# Patient Record
Sex: FEMALE | Race: WHITE | NOT HISPANIC OR LATINO | Employment: FULL TIME | URBAN - METROPOLITAN AREA
[De-identification: names, ages, dates, MRNs, and addresses within clinical notes are randomized per-mention and may not be internally consistent; named-entity substitution may affect disease eponyms.]

---

## 2017-02-23 ENCOUNTER — OFFICE VISIT (OUTPATIENT)
Dept: MEDICAL GROUP | Facility: MEDICAL CENTER | Age: 32
End: 2017-02-23
Payer: COMMERCIAL

## 2017-02-23 VITALS
HEIGHT: 66 IN | TEMPERATURE: 97.5 F | HEART RATE: 98 BPM | DIASTOLIC BLOOD PRESSURE: 60 MMHG | OXYGEN SATURATION: 100 % | RESPIRATION RATE: 14 BRPM | BODY MASS INDEX: 26.64 KG/M2 | WEIGHT: 165.79 LBS | SYSTOLIC BLOOD PRESSURE: 106 MMHG

## 2017-02-23 DIAGNOSIS — R53.83 LETHARGY: ICD-10-CM

## 2017-02-23 DIAGNOSIS — F41.9 ANXIETY: ICD-10-CM

## 2017-02-23 DIAGNOSIS — Z00.00 HEALTH MAINTENANCE EXAMINATION: ICD-10-CM

## 2017-02-23 DIAGNOSIS — K20.0 EOSINOPHILIC ESOPHAGITIS: ICD-10-CM

## 2017-02-23 PROCEDURE — 99204 OFFICE O/P NEW MOD 45 MIN: CPT | Performed by: FAMILY MEDICINE

## 2017-02-23 RX ORDER — ONDANSETRON 8 MG/1
8 TABLET, ORALLY DISINTEGRATING ORAL EVERY 8 HOURS PRN
Qty: 15 TAB | Refills: 0 | Status: SHIPPED | OUTPATIENT
Start: 2017-02-23 | End: 2017-11-15

## 2017-02-23 RX ORDER — ALPRAZOLAM 1 MG/1
1 TABLET ORAL NIGHTLY PRN
COMMUNITY
End: 2017-02-23 | Stop reason: SDUPTHER

## 2017-02-23 RX ORDER — OMEPRAZOLE 40 MG/1
40 CAPSULE, DELAYED RELEASE ORAL DAILY
COMMUNITY
End: 2019-03-13 | Stop reason: SDUPTHER

## 2017-02-23 RX ORDER — ALPRAZOLAM 1 MG/1
1 TABLET ORAL
Qty: 30 TAB | Refills: 1 | Status: SHIPPED | OUTPATIENT
Start: 2017-02-23 | End: 2018-03-07 | Stop reason: SDUPTHER

## 2017-02-23 RX ORDER — IBUPROFEN 800 MG/1
800 TABLET ORAL EVERY 8 HOURS PRN
COMMUNITY
End: 2020-08-12

## 2017-02-23 RX ORDER — SCOLOPAMINE TRANSDERMAL SYSTEM 1 MG/1
1 PATCH, EXTENDED RELEASE TRANSDERMAL
Qty: 4 PATCH | Refills: 0 | Status: SHIPPED | OUTPATIENT
Start: 2017-02-23 | End: 2017-08-15

## 2017-02-23 ASSESSMENT — PATIENT HEALTH QUESTIONNAIRE - PHQ9: CLINICAL INTERPRETATION OF PHQ2 SCORE: 0

## 2017-02-23 NOTE — Clinical Note
Add2paper [facility]  Kallie Corey M.D.  [address]  [place] [address]  Fax: [phone]   Authorization for Release/Disclosure of   Protected Health Information   Name: CARLOTTA RICHARDSON : 1985 SSN: [id number]   Address: [address] [place] [address] [address] [place] [address] Phone:    [phone] (home)    I authorize the entity listed below to release/disclose the PHI below to:   [facility]/Kallie Corey M.D. and Kallie Corey M.D.   Provider or Entity Name:  [facility]    Address   [facility], [facility], [facility]   Phone:      Fax:     Reason for request: continuity of care   Information to be released:    [  ] LAST COLONOSCOPY,  including any PATH REPORT and follow-up  [  ] LAST FIT/COLOGUARD RESULT [  ] LAST DEXA  [  ] LAST MAMMOGRAM  [x  ] LAST PAP  [  ] LAST LABS [  ] RETINA EXAM REPORT  [  ] IMMUNIZATION RECORDS  [  ] Release all info      [  ] Check here and initial the line next to each item to release ALL health information INCLUDING  _____ Care and treatment for drug and / or alcohol abuse  _____ HIV testing, infection status, or AIDS  _____ Genetic Testing    DATES OF SERVICE OR TIME PERIOD TO BE DISCLOSED: _____________  I understand and acknowledge that:  * This Authorization may be revoked at any time by you in writing, except if your health information has already been used or disclosed.  * Your health information that will be used or disclosed as a result of you signing this authorization could be re-disclosed by the recipient. If this occurs, your re-disclosed health information may no longer be protected by State or Federal laws.  * You may refuse to sign this Authorization. Your refusal will not affect your ability to obtain treatment.  * This Authorization becomes effective upon signing and will  on (date) __________.      If no date is indicated, this Authorization will  one (1) year from the signature date.    Name: Carlotta VILLANUEVA  Beth    Signature:   Date:     2/23/2017       PLEASE FAX REQUESTED RECORDS BACK TO: [phone]

## 2017-02-23 NOTE — MR AVS SNAPSHOT
"Carlotta Campos   2017 3:00 PM   Office Visit   MRN: [id number]    Department:  [place] [address]   Dept Phone:  [phone]    Description:  Female : 1985   Provider:  Kallie Corey M.D.           Reason for Visit     Establish Care           Allergies as of 2017     Allergen Noted Reactions    Vicodin [Hydrocodone-Acetaminophen] 2017   Vomiting      You were diagnosed with     Lethargy   [773502]       Eosinophilic esophagitis   [530.13.ICD-9-CM]       Anxiety   [950353]       Health maintenance examination   [445261]         Vital Signs     Blood Pressure Pulse Temperature Respirations Height Weight    106/60 mmHg 98 36.4 °C (97.5 °F) 14 1.689 m (5' 6.5\") 75.2 kg (165 lb 12.6 oz)    Body Mass Index Oxygen Saturation Smoking Status             26.36 kg/m2 100% Never Smoker          Basic Information     Date Of Birth Sex Race Ethnicity Preferred Language    1985 Female White Non- English      Your appointments     2017  3:40 PM   Established Patient with Kallie Corey M.D.   [facility] (--)    [address] [address] [phone]           You will be receiving a confirmation call a few days before your appointment from our automated call confirmation system.              Problem List              ICD-10-CM Priority Class Noted - Resolved    Lethargy R53.83   2017 - Present    Eosinophilic esophagitis K20.0   2017 - Present    Anxiety F41.9   2017 - Present      Health Maintenance        Date Due Completion Dates    IMM DTaP/Tdap/Td Vaccine (5 - Tdap) 1996 7/10/1990, 1989, 1989, 1987, 1985    PAP SMEAR 2006 ---    IMM INFLUENZA (1) 2016 ---            Current Immunizations     Hepatitis B Vaccine Non-Recombivax (Ped/Adol) 1998, 1998, 3/10/1998    MMR Vaccine 1990, 1989    OPV - Historical Data 1990, 1989, 1985   " dT Vaccine 7/10/1990, 5/1/1989, 4/4/1989, 11/5/1987, 1985      Below and/or attached are the medications your provider expects you to take. Review all of your home medications and newly ordered medications with your provider and/or pharmacist. Follow medication instructions as directed by your provider and/or pharmacist. Please keep your medication list with you and share with your provider. Update the information when medications are discontinued, doses are changed, or new medications (including over-the-counter products) are added; and carry medication information at all times in the event of emergency situations     Allergies:  VICODIN - Vomiting               Medications  Valid as of: February 23, 2017 -  4:00 PM    Generic Name Brand Name Tablet Size Instructions for use    ALPRAZolam (Tab) XANAX 1 MG Take 1 Tab by mouth 1 time daily as needed for Anxiety.        Ibuprofen (Tab) MOTRIN 800 MG Take 800 mg by mouth every 8 hours as needed.        Omeprazole (CAPSULE DELAYED RELEASE) PRILOSEC 40 MG Take 40 mg by mouth every day.        Ondansetron (TABLET DISPERSIBLE) ZOFRAN ODT 8 MG Take 1 Tab by mouth every 8 hours as needed for Nausea/Vomiting.        Scopolamine Base (PATCH 72 HR) TRANSDERM-SCOP 1.5 MG/3DAYS Apply 1 Patch to skin as directed every 72 hours.        .                 Medicines prescribed today were sent to:     [facility]ACY #[id number] - [place] - [address]    [address] [place] [address]    Phone: [phone] Fax: [phone]    Open 24 Hours?: No      Medication refill instructions:       If your prescription bottle indicates you have medication refills left, it is not necessary to call your provider’s office. Please contact your pharmacy and they will refill your medication.    If your prescription bottle indicates you do not have any refills left, you may request refills at any time through one of the following ways: The online [facility] system (except Urgent Care), by calling  your provider’s office, or by asking your pharmacy to contact your provider’s office with a refill request. Medication refills are processed only during regular business hours and may not be available until the next business day. Your provider may request additional information or to have a follow-up visit with you prior to refilling your medication.   *Please Note: Medication refills are assigned a new Rx number when refilled electronically. Your pharmacy may indicate that no refills were authorized even though a new prescription for the same medication is available at the pharmacy. Please request the medicine by name with the pharmacy before contacting your provider for a refill.        Your To Do List     Future Labs/Procedures Complete By Expires    CBC WITH DIFFERENTIAL  As directed 2/24/2018    COMP METABOLIC PANEL  As directed 2/24/2018    LIPID PROFILE  As directed 2/24/2018    TSH WITH REFLEX TO FT4  As directed 2/23/2018    VITAMIN B12  As directed 2/24/2018    VITAMIN D,25 HYDROXY  As directed 2/24/2018         MyChart Access Code: Activation code not generated  Current [employer] Status: Active

## 2017-02-24 NOTE — PROGRESS NOTES
Carlotta Campos is a 32 y.o. female here for   Chief Complaint   Patient presents with   • Establish Care       HPI:  Carlotta is a very pleasant 32 y.o. female. She works at [employer].     1. Lethargy  This is a new problem. Patient states she has had a difficult time getting out of bed in the morning and overall feels run down. She denies any depression. She denies f/c. She has had cold sx recently. She is concerned b/c thyroid dysfunction runs in her family and thinks that this may be related.     2. Eosinophilic esophagitis  This is chronic. She was diagnosed by EGD with bx by Dr. Rao. She is on omeprazole 40mg/day which is controlling her symptoms such as dysphagia. She is also seeing Dr. Aldridge, allergy, and is undergoing allergy testing. She will start food elimination.     3. Anxiety  This is chronic. She is on xanax. She was started on this at age 18. She was taking this medication daily for several years. She was on prozac which did not seem to help significantly. She has not on tried other SSRIs. She has worked on her mindfulness and yoga. She only uses xanax about 1 time per month or when flying.     4. Health maintenance examination  Pap - done 2 years ago, has OBGYN  Vaccines - will need tdap when we get these in   Diet and exercise discussed      Current medicines (including changes today)  Current Outpatient Prescriptions   Medication Sig Dispense Refill   • omeprazole (PRILOSEC) 40 MG delayed-release capsule Take 40 mg by mouth every day.     • ibuprofen (MOTRIN) 800 MG Tab Take 800 mg by mouth every 8 hours as needed.     • alprazolam (XANAX) 1 MG Tab Take 1 Tab by mouth 1 time daily as needed for Anxiety. 30 Tab 1   • scopolamine (TRANSDERM-SCOP) 1.5 MG/3DAYS PATCH 72 HR Apply 1 Patch to skin as directed every 72 hours. 4 Patch 0   • ondansetron (ZOFRAN ODT) 8 MG TABLET DISPERSIBLE Take 1 Tab by mouth every 8 hours as needed for Nausea/Vomiting. 15 Tab 0     No current facility-administered  "medications for this visit.     She  has a past medical history of Panic attack and Eosinophilic esophagitis (2/23/2017).  She  has past surgical history that includes appendectomy and cholecystectomy.  Social History   Substance Use Topics   • Smoking status: Never Smoker    • Smokeless tobacco: Never Used   • Alcohol Use: 1.8 oz/week     3 Glasses of wine per week     Social History     Social History Narrative     Family History   Problem Relation Age of Onset   • Thyroid Mother    • Hypertension Mother    • Hyperlipidemia Mother    • Thyroid Paternal Aunt    • Thyroid Maternal Grandmother    • No Known Problems Father    • Heart Disease Paternal Grandfather      MI      Family Status   Relation Status Death Age   • Mother Alive    • Father Alive          ROS  Positive for sore throat, lethargy  All other systems reviewed and are negative     Objective:     Blood pressure 106/60, pulse 98, temperature 36.4 °C (97.5 °F), resp. rate 14, height 1.689 m (5' 6.5\"), weight 75.2 kg (165 lb 12.6 oz), SpO2 100 %. Body mass index is 26.36 kg/(m^2).  Physical Exam:    Constitutional: Alert, no distress.  Skin: Warm, dry, good turgor, no rashes in visible areas.  Eye: Equal, round and reactive, conjunctiva clear, lids normal.  ENMT: Lips without lesions, good dentition, oropharynx clear. TM's pearly gray with normal light reflexes bilaterally  Neck: Trachea midline, no masses, no thyromegaly. No cervical or supraclavicular lymphadenopathy.  Respiratory: Unlabored respiratory effort, lungs clear to auscultation bilaterally, no wheezes, no ronchi.  Cardiovascular: Normal S1, S2, RRR, no murmur, no edema.  Abdomen: Soft, non-tender, no masses, no hepatosplenomegaly.  Psych: Alert and oriented x3, normal affect and mood.      Assessment and Plan:   The following treatment plan was discussed    1. Lethargy  New, etiology unclear, may be 2/2 illness or wintertime blues. Requires further evaluation, especially given fhx of thyroid " dysfunction  - TSH WITH REFLEX TO FT4; Future  - VITAMIN D,25 HYDROXY; Future  - CBC WITH DIFFERENTIAL; Future  - VITAMIN B12; Future    2. Eosinophilic esophagitis  Chronic, well controlled  Continue omeprazole 40mg daily  F/U with Dr. Rao and Dr. Aldridge  Food elimination diet    3. Anxiety  Chronic, well contolled  Using xanax 1 time per month, discussed weaning completely  Refill given  Continue mindfulness  - alprazolam (XANAX) 1 MG Tab; Take 1 Tab by mouth 1 time daily as needed for Anxiety.  Dispense: 30 Tab; Refill: 1    4. Health maintenance examination  Records requested for pap  Labs ordered  - LIPID PROFILE; Future  - CBC WITH DIFFERENTIAL; Future  - COMP METABOLIC PANEL; Future      Records requested.  Followup: Return in about 6 weeks (around 4/6/2017) for Labs, annual .         This note was created using voice recognition software. I have made every reasonable attempt to correct errors, however, I do anticipate some grammatical errors.

## 2017-03-29 ENCOUNTER — HOSPITAL ENCOUNTER (OUTPATIENT)
Dept: LAB | Facility: MEDICAL CENTER | Age: 32
End: 2017-03-29
Attending: FAMILY MEDICINE
Payer: COMMERCIAL

## 2017-03-29 DIAGNOSIS — Z00.00 HEALTH MAINTENANCE EXAMINATION: ICD-10-CM

## 2017-03-29 DIAGNOSIS — R53.83 LETHARGY: ICD-10-CM

## 2017-03-29 LAB
25(OH)D3 SERPL-MCNC: 28 NG/ML (ref 30–100)
ALBUMIN SERPL BCP-MCNC: 4.7 G/DL (ref 3.2–4.9)
ALBUMIN/GLOB SERPL: 2.2 G/DL
ALP SERPL-CCNC: 46 U/L (ref 30–99)
ALT SERPL-CCNC: 11 U/L (ref 2–50)
ANION GAP SERPL CALC-SCNC: 6 MMOL/L (ref 0–11.9)
AST SERPL-CCNC: 16 U/L (ref 12–45)
BASOPHILS # BLD AUTO: 0.11 K/UL (ref 0–0.12)
BASOPHILS NFR BLD AUTO: 1.4 % (ref 0–1.8)
BILIRUB SERPL-MCNC: 0.5 MG/DL (ref 0.1–1.5)
BUN SERPL-MCNC: 12 MG/DL (ref 8–22)
CALCIUM SERPL-MCNC: 9.7 MG/DL (ref 8.5–10.5)
CHLORIDE SERPL-SCNC: 104 MMOL/L (ref 96–112)
CHOLEST SERPL-MCNC: 145 MG/DL (ref 100–199)
CO2 SERPL-SCNC: 28 MMOL/L (ref 20–33)
CREAT SERPL-MCNC: 0.7 MG/DL (ref 0.5–1.4)
EOSINOPHIL # BLD: 0.3 K/UL (ref 0–0.51)
EOSINOPHIL NFR BLD AUTO: 3.7 % (ref 0–6.9)
ERYTHROCYTE [DISTWIDTH] IN BLOOD BY AUTOMATED COUNT: 43.8 FL (ref 35.9–50)
GLOBULIN SER CALC-MCNC: 2.1 G/DL (ref 1.9–3.5)
GLUCOSE SERPL-MCNC: 95 MG/DL (ref 65–99)
HCT VFR BLD AUTO: 44.3 % (ref 37–47)
HDLC SERPL-MCNC: 62 MG/DL
HGB BLD-MCNC: 14.4 G/DL (ref 12–16)
IMM GRANULOCYTES # BLD AUTO: 0.01 K/UL (ref 0–0.11)
IMM GRANULOCYTES NFR BLD AUTO: 0.1 % (ref 0–0.9)
LDLC SERPL CALC-MCNC: 71 MG/DL
LYMPHOCYTES # BLD: 1.98 K/UL (ref 1–4.8)
LYMPHOCYTES NFR BLD AUTO: 24.4 % (ref 22–41)
MCH RBC QN AUTO: 29.5 PG (ref 27–33)
MCHC RBC AUTO-ENTMCNC: 32.5 G/DL (ref 33.6–35)
MCV RBC AUTO: 90.8 FL (ref 81.4–97.8)
MONOCYTES # BLD: 0.53 K/UL (ref 0–0.85)
MONOCYTES NFR BLD AUTO: 6.5 % (ref 0–13.4)
NEUTROPHILS # BLD: 5.19 K/UL (ref 2–7.15)
NEUTROPHILS NFR BLD AUTO: 63.9 % (ref 44–72)
NRBC # BLD AUTO: 0 K/UL
NRBC BLD-RTO: 0 /100 WBC
PLATELET # BLD AUTO: 263 K/UL (ref 164–446)
PMV BLD AUTO: 10.5 FL (ref 9–12.9)
POTASSIUM SERPL-SCNC: 4.4 MMOL/L (ref 3.6–5.5)
PROT SERPL-MCNC: 6.8 G/DL (ref 6–8.2)
RBC # BLD AUTO: 4.88 M/UL (ref 4.2–5.4)
SODIUM SERPL-SCNC: 138 MMOL/L (ref 135–145)
TRIGL SERPL-MCNC: 59 MG/DL (ref 0–149)
TSH SERPL DL<=0.005 MIU/L-ACNC: 1.48 UIU/ML (ref 0.3–3.7)
VIT B12 SERPL-MCNC: 562 PG/ML (ref 211–911)
WBC # BLD AUTO: 8.1 K/UL (ref 4.8–10.8)

## 2017-03-29 PROCEDURE — 82306 VITAMIN D 25 HYDROXY: CPT

## 2017-03-29 PROCEDURE — 82607 VITAMIN B-12: CPT

## 2017-03-29 PROCEDURE — 84443 ASSAY THYROID STIM HORMONE: CPT

## 2017-03-29 PROCEDURE — 80061 LIPID PANEL: CPT

## 2017-03-29 PROCEDURE — 80053 COMPREHEN METABOLIC PANEL: CPT

## 2017-03-29 PROCEDURE — 36415 COLL VENOUS BLD VENIPUNCTURE: CPT

## 2017-03-29 PROCEDURE — 85025 COMPLETE CBC W/AUTO DIFF WBC: CPT

## 2017-04-06 ENCOUNTER — OFFICE VISIT (OUTPATIENT)
Dept: MEDICAL GROUP | Facility: LAB | Age: 32
End: 2017-04-06
Payer: COMMERCIAL

## 2017-04-06 VITALS
RESPIRATION RATE: 12 BRPM | HEART RATE: 76 BPM | HEIGHT: 66 IN | OXYGEN SATURATION: 96 % | DIASTOLIC BLOOD PRESSURE: 74 MMHG | TEMPERATURE: 98.1 F | SYSTOLIC BLOOD PRESSURE: 104 MMHG | WEIGHT: 165.57 LBS | BODY MASS INDEX: 26.61 KG/M2

## 2017-04-06 DIAGNOSIS — Z00.00 HEALTH CARE MAINTENANCE: ICD-10-CM

## 2017-04-06 DIAGNOSIS — M62.838 NECK MUSCLE SPASM: ICD-10-CM

## 2017-04-06 DIAGNOSIS — Z23 NEED FOR VACCINATION: ICD-10-CM

## 2017-04-06 DIAGNOSIS — M25.561 ACUTE PAIN OF RIGHT KNEE: ICD-10-CM

## 2017-04-06 DIAGNOSIS — E55.9 VITAMIN D DEFICIENCY: ICD-10-CM

## 2017-04-06 PROCEDURE — 90471 IMMUNIZATION ADMIN: CPT | Performed by: FAMILY MEDICINE

## 2017-04-06 PROCEDURE — 99395 PREV VISIT EST AGE 18-39: CPT | Mod: 25 | Performed by: FAMILY MEDICINE

## 2017-04-06 PROCEDURE — 90715 TDAP VACCINE 7 YRS/> IM: CPT | Performed by: FAMILY MEDICINE

## 2017-04-06 RX ORDER — CYCLOBENZAPRINE HCL 10 MG
10 TABLET ORAL 3 TIMES DAILY PRN
Qty: 90 TAB | Refills: 1 | Status: SHIPPED | OUTPATIENT
Start: 2017-04-06 | End: 2017-08-15

## 2017-04-06 NOTE — MR AVS SNAPSHOT
"Carlotta Campos   2017 3:40 PM   Office Visit   MRN: [id number]    Department:  [facility]   Dept Phone:  [phone]    Description:  Female : 1985   Provider:  Kallie Corey M.D.           Reason for Visit     Annual Exam labs      Allergies as of 2017     Allergen Noted Reactions    Vicodin [Hydrocodone-Acetaminophen] 2017   Vomiting      You were diagnosed with     Health care maintenance   [335282]       Neck muscle spasm   [627687]       Acute pain of right knee   [5035664]       Need for vaccination   [578316]         Vital Signs     Blood Pressure Pulse Temperature Respirations Height Weight    104/74 mmHg 76 36.7 °C (98.1 °F) 12 1.689 m (5' 6.5\") 75.1 kg (165 lb 9.1 oz)    Body Mass Index Oxygen Saturation Smoking Status             26.33 kg/m2 96% Never Smoker          Basic Information     Date Of Birth Sex Race Ethnicity Preferred Language    1985 Female White Non- English      Problem List              ICD-10-CM Priority Class Noted - Resolved    Lethargy R53.83   2017 - Present    Eosinophilic esophagitis K20.0   2017 - Present    Anxiety F41.9   2017 - Present    Neck muscle spasm M62.838   2017 - Present    Acute pain of right knee M25.561   2017 - Present      Health Maintenance        Date Due Completion Dates    IMM DTaP/Tdap/Td Vaccine (5 - Tdap) 1996 7/10/1990, 1989, 1989, 1987, 1985    PAP SMEAR 10/27/2018 10/27/2015            Current Immunizations     Hepatitis B Vaccine Non-Recombivax (Ped/Adol) 1998, 1998, 3/10/1998    MMR Vaccine 1990, 1989    OPV - Historical Data 1990, 1989, 1985    Tdap Vaccine  Incomplete    dT Vaccine 7/10/1990, 1989, 1989, 1987, 1985      Below and/or attached are the medications your provider expects you to take. Review all of your home medications and newly ordered medications with your provider and/or " pharmacist. Follow medication instructions as directed by your provider and/or pharmacist. Please keep your medication list with you and share with your provider. Update the information when medications are discontinued, doses are changed, or new medications (including over-the-counter products) are added; and carry medication information at all times in the event of emergency situations     Allergies:  VICODIN - Vomiting               Medications  Valid as of: April 06, 2017 -  4:15 PM    Generic Name Brand Name Tablet Size Instructions for use    ALPRAZolam (Tab) XANAX 1 MG Take 1 Tab by mouth 1 time daily as needed for Anxiety.        Cholecalciferol (Tab) cholecalciferol 1000 UNIT Take 1,000 Units by mouth every day.        Cyclobenzaprine HCl (Tab) FLEXERIL 10 MG Take 1 Tab by mouth 3 times a day as needed.        Ibuprofen (Tab) MOTRIN 800 MG Take 800 mg by mouth every 8 hours as needed.        Omeprazole (CAPSULE DELAYED RELEASE) PRILOSEC 40 MG Take 40 mg by mouth every day.        Ondansetron (TABLET DISPERSIBLE) ZOFRAN ODT 8 MG Take 1 Tab by mouth every 8 hours as needed for Nausea/Vomiting.        Scopolamine Base (PATCH 72 HR) TRANSDERM-SCOP 1.5 MG/3DAYS Apply 1 Patch to skin as directed every 72 hours.        .                 Medicines prescribed today were sent to:     [facility]/PHARMACY #[id number] - [place] - [address]    [address] [place] [address]    Phone: [phone] Fax: [phone]    Open 24 Hours?: No      Medication refill instructions:       If your prescription bottle indicates you have medication refills left, it is not necessary to call your provider’s office. Please contact your pharmacy and they will refill your medication.    If your prescription bottle indicates you do not have any refills left, you may request refills at any time through one of the following ways: The online [employer] system (except Urgent Care), by calling your provider’s office, or by asking your pharmacy to  contact your provider’s office with a refill request. Medication refills are processed only during regular business hours and may not be available until the next business day. Your provider may request additional information or to have a follow-up visit with you prior to refilling your medication.   *Please Note: Medication refills are assigned a new Rx number when refilled electronically. Your pharmacy may indicate that no refills were authorized even though a new prescription for the same medication is available at the pharmacy. Please request the medicine by name with the pharmacy before contacting your provider for a refill.           [employer] Access Code: Activation code not generated  Current [employer] Status: Active

## 2017-07-11 ENCOUNTER — HOSPITAL ENCOUNTER (EMERGENCY)
Facility: MEDICAL CENTER | Age: 32
End: 2017-07-11
Attending: EMERGENCY MEDICINE
Payer: COMMERCIAL

## 2017-07-11 VITALS
HEIGHT: 67 IN | RESPIRATION RATE: 20 BRPM | WEIGHT: 165.34 LBS | SYSTOLIC BLOOD PRESSURE: 120 MMHG | OXYGEN SATURATION: 98 % | DIASTOLIC BLOOD PRESSURE: 84 MMHG | TEMPERATURE: 98.2 F | BODY MASS INDEX: 25.95 KG/M2 | HEART RATE: 98 BPM

## 2017-07-11 DIAGNOSIS — R10.13 EPIGASTRIC PAIN: ICD-10-CM

## 2017-07-11 LAB
ALBUMIN SERPL BCP-MCNC: 4.5 G/DL (ref 3.2–4.9)
ALBUMIN/GLOB SERPL: 2.1 G/DL
ALP SERPL-CCNC: 38 U/L (ref 30–99)
ALT SERPL-CCNC: 35 U/L (ref 2–50)
ANION GAP SERPL CALC-SCNC: 7 MMOL/L (ref 0–11.9)
APPEARANCE UR: ABNORMAL
AST SERPL-CCNC: 61 U/L (ref 12–45)
BASOPHILS # BLD AUTO: 0.4 % (ref 0–1.8)
BASOPHILS # BLD: 0.04 K/UL (ref 0–0.12)
BILIRUB SERPL-MCNC: 0.7 MG/DL (ref 0.1–1.5)
BUN SERPL-MCNC: 10 MG/DL (ref 8–22)
CALCIUM SERPL-MCNC: 9.5 MG/DL (ref 8.4–10.2)
CHLORIDE SERPL-SCNC: 105 MMOL/L (ref 96–112)
CO2 SERPL-SCNC: 26 MMOL/L (ref 20–33)
COLOR UR: YELLOW
CREAT SERPL-MCNC: 0.75 MG/DL (ref 0.5–1.4)
EOSINOPHIL # BLD AUTO: 0.09 K/UL (ref 0–0.51)
EOSINOPHIL NFR BLD: 1 % (ref 0–6.9)
ERYTHROCYTE [DISTWIDTH] IN BLOOD BY AUTOMATED COUNT: 42 FL (ref 35.9–50)
GFR SERPL CREATININE-BSD FRML MDRD: >60 ML/MIN/1.73 M 2
GLOBULIN SER CALC-MCNC: 2.1 G/DL (ref 1.9–3.5)
GLUCOSE SERPL-MCNC: 103 MG/DL (ref 65–99)
GLUCOSE UR STRIP.AUTO-MCNC: NEGATIVE MG/DL
HCG UR QL: NEGATIVE
HCT VFR BLD AUTO: 41.6 % (ref 37–47)
HGB BLD-MCNC: 14.3 G/DL (ref 12–16)
IMM GRANULOCYTES # BLD AUTO: 0.02 K/UL (ref 0–0.11)
IMM GRANULOCYTES NFR BLD AUTO: 0.2 % (ref 0–0.9)
KETONES UR STRIP.AUTO-MCNC: NEGATIVE MG/DL
LEUKOCYTE ESTERASE UR QL STRIP.AUTO: NEGATIVE
LIPASE SERPL-CCNC: 22 U/L (ref 7–58)
LYMPHOCYTES # BLD AUTO: 3.14 K/UL (ref 1–4.8)
LYMPHOCYTES NFR BLD: 34.6 % (ref 22–41)
MCH RBC QN AUTO: 30.5 PG (ref 27–33)
MCHC RBC AUTO-ENTMCNC: 34.4 G/DL (ref 33.6–35)
MCV RBC AUTO: 88.7 FL (ref 81.4–97.8)
MONOCYTES # BLD AUTO: 0.87 K/UL (ref 0–0.85)
MONOCYTES NFR BLD AUTO: 9.6 % (ref 0–13.4)
NEUTROPHILS # BLD AUTO: 4.91 K/UL (ref 2–7.15)
NEUTROPHILS NFR BLD: 54.2 % (ref 44–72)
NITRITE UR QL STRIP.AUTO: NEGATIVE
NRBC # BLD AUTO: 0 K/UL
NRBC BLD AUTO-RTO: 0 /100 WBC
PH UR STRIP.AUTO: 6.5 [PH]
PLATELET # BLD AUTO: 274 K/UL (ref 164–446)
PMV BLD AUTO: 9.7 FL (ref 9–12.9)
POTASSIUM SERPL-SCNC: 3.7 MMOL/L (ref 3.6–5.5)
PROT SERPL-MCNC: 6.6 G/DL (ref 6–8.2)
PROT UR QL STRIP: 30 MG/DL
RBC # BLD AUTO: 4.69 M/UL (ref 4.2–5.4)
RBC UR QL AUTO: NEGATIVE
SODIUM SERPL-SCNC: 138 MMOL/L (ref 135–145)
SP GR UR STRIP.AUTO: 1.02
WBC # BLD AUTO: 9.1 K/UL (ref 4.8–10.8)

## 2017-07-11 PROCEDURE — 36415 COLL VENOUS BLD VENIPUNCTURE: CPT

## 2017-07-11 PROCEDURE — 99284 EMERGENCY DEPT VISIT MOD MDM: CPT

## 2017-07-11 PROCEDURE — 700102 HCHG RX REV CODE 250 W/ 637 OVERRIDE(OP): Performed by: EMERGENCY MEDICINE

## 2017-07-11 PROCEDURE — 83690 ASSAY OF LIPASE: CPT

## 2017-07-11 PROCEDURE — 80053 COMPREHEN METABOLIC PANEL: CPT

## 2017-07-11 PROCEDURE — 81002 URINALYSIS NONAUTO W/O SCOPE: CPT

## 2017-07-11 PROCEDURE — 81025 URINE PREGNANCY TEST: CPT

## 2017-07-11 PROCEDURE — 85025 COMPLETE CBC W/AUTO DIFF WBC: CPT

## 2017-07-11 PROCEDURE — 93005 ELECTROCARDIOGRAM TRACING: CPT

## 2017-07-11 PROCEDURE — A9270 NON-COVERED ITEM OR SERVICE: HCPCS | Performed by: EMERGENCY MEDICINE

## 2017-07-11 RX ADMIN — LIDOCAINE HYDROCHLORIDE 30 ML: 20 SOLUTION OROPHARYNGEAL at 20:26

## 2017-07-11 ASSESSMENT — PAIN SCALES - GENERAL: PAINLEVEL_OUTOF10: 10

## 2017-07-11 NOTE — ED AVS SNAPSHOT
7/11/2017    Carlotta Campos  [address] [place] [address]    Dear Carlotta:    [facility] wants to ensure your discharge home is safe and you or your loved ones have had all of your questions answered regarding your care after you leave the hospital.    Below is a list of resources and contact information should you have any questions regarding your hospital stay, follow-up instructions, or active medical symptoms.    Questions or Concerns Regarding… Contact   Medical Questions Related to Your Discharge  (7 days a week, 8am-5pm) Contact a Nurse Care Coordinator   [phone]   Medical Questions Not Related to Your Discharge  (24 hours a day / 7 days a week)  Contact the Nurse Health Line   [phone]    Medications or Discharge Instructions Refer to your discharge packet   or contact your [facility] Primary Care Provider   [phone]   Follow-up Appointment(s) Schedule your appointment via [employer]   or contact Scheduling [phone]   Billing Review your statement via [employer]  or contact Billing [phone]   Medical Records Review your records via [employer]   or contact Medical Records [phone]     You may receive a telephone call within two days of discharge. This call is to make certain you understand your discharge instructions and have the opportunity to have any questions answered. You can also easily access your medical information, test results and upcoming appointments via the mValent free online health management tool. You can learn more and sign up at [employer]/mValent. For assistance setting up your mValent account, please call [phone].    Once again, we want to ensure your discharge home is safe and that you have a clear understanding of any next steps in your care. If you have any questions or concerns, please do not hesitate to contact us, we are here for you. Thank you for choosing [facility] for your healthcare needs.    Sincerely,    Your [facility] Healthcare Team          oriented to person, place, time and situation

## 2017-07-11 NOTE — ED AVS SNAPSHOT
WeDuc Access Code: Activation code not generated  Current WeDuc Status: Active    [employer]  A secure, online tool to manage your health information     [employer]’s WeDuc® is a secure, online tool that connects you to your personalized health information from the privacy of your home -- day or night - making it very easy for you to manage your healthcare. Once the activation process is completed, you can even access your medical information using the WeDuc rafa, which is available for free in the [employer]a store or Google Play store.     WeDuc provides the following levels of access (as shown below):   My Chart Features   [facility] Primary Care Doctor [facility]  Specialists [facility]  Urgent  Care Non-[facility]  Primary Care  Doctor   Email your healthcare team securely and privately 24/7 X X X X   Manage appointments: schedule your next appointment; view details of past/upcoming appointments X      Request prescription refills. X      View recent personal medical records, including lab and immunizations X X X X   View health record, including health history, allergies, medications X X X X   Read reports about your outpatient visits, procedures, consult and ER notes X X X X   See your discharge summary, which is a recap of your hospital and/or ER visit that includes your diagnosis, lab results, and care plan. X X       How to register for WeDuc:  1. Go to  [email or web address].  2. Click on the Sign Up Now box, which takes you to the New Member Sign Up page. You will need to provide the following information:  a. Enter your WeDuc Access Code exactly as it appears at the top of this page. (You will not need to use this code after you’ve completed the sign-up process. If you do not sign up before the expiration date, you must request a new code.)   b. Enter your date of birth.   c. Enter your home email address.   d. Click Submit, and follow the next screen’s instructions.  3. Create a WeDuc ID. This will  be your [employer] login ID and cannot be changed, so think of one that is secure and easy to remember.  4. Create a [employer] password. You can change your password at any time.  5. Enter your Password Reset Question and Answer. This can be used at a later time if you forget your password.   6. Enter your e-mail address. This allows you to receive e-mail notifications when new information is available in [employer].  7. Click Sign Up. You can now view your health information.    For assistance activating your [employer] account, call [phone]

## 2017-07-11 NOTE — ED AVS SNAPSHOT
Home Care Instructions                                                                                                                Carlotta Campos   MRN: [id number]    Department:  [facility], Emergency Dept   Date of Visit:  7/11/2017            [facility], Emergency Dept    [address]    [place] [address]    Phone:  [phone]      You were seen by     Jaylon Maxwell M.D.      Your Diagnosis Was     Epigastric pain     R10.13       These are the medications you received during your hospitalization from 07/11/2017 1948 to 07/11/2017 2107     Date/Time Order Dose Route Action    07/11/2017 2026 hyoscyamine-maalox plus-lidocaine viscous (GI COCKTAIL) oral susp 30 mL 30 mL Oral Given      Follow-up Information     1. Follow up with [facility], Emergency Dept.    Specialty:  Emergency Medicine    Contact information    [id number] [address] [phone]      Medication Information     Review all of your home medications and newly ordered medications with your primary doctor and/or pharmacist as soon as possible. Follow medication instructions as directed by your doctor and/or pharmacist.     Please keep your complete medication list with you and share with your physician. Update the information when medications are discontinued, doses are changed, or new medications (including over-the-counter products) are added; and carry medication information at all times in the event of emergency situations.               Medication List      ASK your doctor about these medications        Instructions    Morning Afternoon Evening Bedtime    alprazolam 1 MG Tabs   Commonly known as:  XANAX        Take 1 Tab by mouth 1 time daily as needed for Anxiety.   Dose:  1 mg                        cyclobenzaprine 10 MG Tabs   Commonly known as:  FLEXERIL        Take 1 Tab by mouth 3 times a day as needed.   Dose:  10 mg                        ibuprofen 800 MG Tabs   Commonly known as:  MOTRIN        Take 800 mg by mouth every 8 hours as needed.   Dose:  800 mg                        multivitamin Tabs        Take 1 Tab by mouth every day.   Dose:  1 Tab                        ondansetron 8 MG Tbdp   Commonly known as:  ZOFRAN ODT        Take 1 Tab by mouth every 8 hours as needed for Nausea/Vomiting.   Dose:  8 mg                        PRILOSEC 40 MG delayed-release capsule   Generic drug:  omeprazole        Take 40 mg by mouth every day.   Dose:  40 mg                        scopolamine 1.5 MG/3DAYS Pt72   Commonly known as:  TRANSDERM-SCOP        Apply 1 Patch to skin as directed every 72 hours.   Dose:  1 Patch                        vitamin D 1000 UNIT Tabs   Commonly known as:  cholecalciferol        Take 1,000 Units by mouth every day.   Dose:  1000 Units                                Procedures and tests performed during your visit     CARDIAC MONITORING    CBC WITH DIFFERENTIAL    COMP METABOLIC PANEL    ESTIMATED GFR    LIPASE    O2 Protocol    POC UA    POC URINE PREGNANCY    SALINE LOCK        Discharge Instructions       Abdominal Pain, Women  Abdominal (stomach, pelvic, or belly) pain can be caused by many things. It is important to tell your doctor:  · The location of the pain.  · Does it come and go or is it present all the time?  · Are there things that start the pain (eating certain foods, exercise)?  · Are there other symptoms associated with the pain (fever, nausea, vomiting, diarrhea)?  All of this is helpful to know when trying to find the cause of the pain.  CAUSES   · Stomach: virus or bacteria infection, or ulcer.  · Intestine: appendicitis (inflamed appendix), regional ileitis (Crohn's disease), ulcerative colitis (inflamed colon), irritable bowel syndrome, diverticulitis (inflamed diverticulum of the colon), or cancer of the stomach or intestine.  · Gallbladder disease or stones in the gallbladder.  · Kidney  disease, kidney stones, or infection.  · Pancreas infection or cancer.  · Fibromyalgia (pain disorder).  · Diseases of the female organs:  · Uterus: fibroid (non-cancerous) tumors or infection.  · Fallopian tubes: infection or tubal pregnancy.  · Ovary: cysts or tumors.  · Pelvic adhesions (scar tissue).  · Endometriosis (uterus lining tissue growing in the pelvis and on the pelvic organs).  · Pelvic congestion syndrome (female organs filling up with blood just before the menstrual period).  · Pain with the menstrual period.  · Pain with ovulation (producing an egg).  · Pain with an IUD (intrauterine device, birth control) in the uterus.  · Cancer of the female organs.  · Functional pain (pain not caused by a disease, may improve without treatment).  · Psychological pain.  · Depression.  DIAGNOSIS   Your doctor will decide the seriousness of your pain by doing an examination.  · Blood tests.  · X-rays.  · Ultrasound.  · CT scan (computed tomography, special type of X-ray).  · MRI (magnetic resonance imaging).  · Cultures, for infection.  · Barium enema (dye inserted in the large intestine, to better view it with X-rays).  · Colonoscopy (looking in intestine with a lighted tube).  · Laparoscopy (minor surgery, looking in abdomen with a lighted tube).  · Major abdominal exploratory surgery (looking in abdomen with a large incision).  TREATMENT   The treatment will depend on the cause of the pain.   · Many cases can be observed and treated at home.  · Over-the-counter medicines recommended by your caregiver.  · Prescription medicine.  · Antibiotics, for infection.  · Birth control pills, for painful periods or for ovulation pain.  · Hormone treatment, for endometriosis.  · Nerve blocking injections.  · Physical therapy.  · Antidepressants.  · Counseling with a psychologist or psychiatrist.  · Minor or major surgery.  HOME CARE INSTRUCTIONS   · Do not take laxatives, unless directed by your caregiver.  · Take  over-the-counter pain medicine only if ordered by your caregiver. Do not take aspirin because it can cause an upset stomach or bleeding.  · Try a clear liquid diet (broth or water) as ordered by your caregiver. Slowly move to a bland diet, as tolerated, if the pain is related to the stomach or intestine.  · Have a thermometer and take your temperature several times a day, and record it.  · Bed rest and sleep, if it helps the pain.  · Avoid sexual intercourse, if it causes pain.  · Avoid stressful situations.  · Keep your follow-up appointments and tests, as your caregiver orders.  · If the pain does not go away with medicine or surgery, you may try:  · Acupuncture.  · Relaxation exercises (yoga, meditation).  · Group therapy.  · Counseling.  SEEK MEDICAL CARE IF:   · You notice certain foods cause stomach pain.  · Your home care treatment is not helping your pain.  · You need stronger pain medicine.  · You want your IUD removed.  · You feel faint or lightheaded.  · You develop nausea and vomiting.  · You develop a rash.  · You are having side effects or an allergy to your medicine.  SEEK IMMEDIATE MEDICAL CARE IF:   · Your pain does not go away or gets worse.  · You have a fever.  · Your pain is felt only in portions of the abdomen. The right side could possibly be appendicitis. The left lower portion of the abdomen could be colitis or diverticulitis.  · You are passing blood in your stools (bright red or black tarry stools, with or without vomiting).  · You have blood in your urine.  · You develop chills, with or without a fever.  · You pass out.  MAKE SURE YOU:   · Understand these instructions.  · Will watch your condition.  · Will get help right away if you are not doing well or get worse.  Document Released: 10/14/2008 Document Revised: 03/11/2013 Document Reviewed: 11/04/2010  [employer]® Patient Information ©2014 [employer], [employer].            Patient Information     Patient Information    Following emergency  treatment: all patient requiring follow-up care must return either to a private physician or a clinic if your condition worsens before you are able to obtain further medical attention, please return to the emergency room.     Billing Information    At [facility], we work to make the billing process streamlined for our patients.  Our Representatives are here to answer any questions you may have regarding your hospital bill.  If you have insurance coverage and have supplied your insurance information to us, we will submit a claim to your insurer on your behalf.  Should you have any questions regarding your bill, we can be reached online or by phone as follows:  Online: You are able pay your bills online or live chat with our representatives about any billing questions you may have. We are here to help Monday - Friday from 8:00am to 7:30pm and 9:00am - 12:00pm on Saturdays.  Please visit [email or web address]  for more information.   Phone:  [phone] or [phone]    Please note that your emergency physician, surgeon, pathologist, radiologist, anesthesiologist, and other specialists are not employed by [facility] and will therefore bill separately for their services.  Please contact them directly for any questions concerning their bills at the numbers below:     Emergency Physician Services:  [phone]  [facility]:  [phone]  [facility]:  [phone]  [facility]:  [phone]    1. Your final bill may vary from the amount quoted upon discharge if all procedures are not complete at that time, or if your doctor has additional procedures of which we are not aware. You will receive an additional bill if you return to the Emergency Department at [facility] for suture removal regardless of the facility of which the sutures were placed.     2. Please arrange for settlement of this account at the emergency  registration.    3. All self-pay accounts are due in full at the time of treatment.  If you are unable to meet this obligation then payment is expected within 4-5 days.     4. If you have had radiology studies (CT, X-ray, Ultrasound, MRI), you have received a preliminary result during your emergency department visit. Please contact the radiology department [phone] to receive a copy of your final result. Please discuss the Final result with your primary physician or with the follow up physician provided.     Crisis Hotline:  [place] Crisis Hotline:  4-514-CIWQWKI or 1-645.710.7683  [place] Crisis Hotline:    1-526.582.8826 or 540-717-9836         ED Discharge Follow Up Questions    1. In order to provide you with very good care, we would like to follow up with a phone call in the next few days.  May we have your permission to contact you?     YES /  NO    2. What is the best phone number to call you? (       )_____-__________    3. What is the best time to call you?      Morning  /  Afternoon  /  Evening                   Patient Signature:  ____________________________________________________________    Date:  ____________________________________________________________

## 2017-07-12 NOTE — ED PROVIDER NOTES
"ED Provider Note    CHIEF COMPLAINT  Chief Complaint   Patient presents with   • Abdominal Pain   • Epigastric Pain       HPI  Carlotta Campos is a 32 y.o. female who presents to the emergency department with abdominal discomfort. The patient states she started developing waves of abdominal pain epigastric region extending into her chest that started after eating dinner. She states the pain comes and goes and she is unaware of any exacerbating or relieving factors. She states that when it's at its maximum it is severe in intensity. She does not have any associated difficulty with breathing. She does not have any vomiting. She is not any fevers. She's had a cholecystectomy as well as an appendectomy in the past. She does have a history of eosinophilic esophagitis.    REVIEW OF SYSTEMS  See HPI for further details. All other systems are negative.     PAST MEDICAL HISTORY  Past Medical History   Diagnosis Date   • Panic attack    • Eosinophilic esophagitis 2/23/2017   • Psychiatric disorder      anxiety       SOCIAL HISTORY  Social History     Social History   • Marital Status: Single     Spouse Name: N/A   • Number of Children: N/A   • Years of Education: N/A     Social History Main Topics   • Smoking status: Never Smoker    • Smokeless tobacco: Never Used   • Alcohol Use: 1.8 oz/week     3 Glasses of wine per week   • Drug Use: No   • Sexual Activity:     Partners: Male     Birth Control/ Protection: IUD     Other Topics Concern   • None     Social History Narrative           PHYSICAL EXAM  VITAL SIGNS: /44 mmHg  Pulse 103  Temp(Src) 36.4 °C (97.6 °F)  Resp 22  Ht 1.702 m (5' 7\")  Wt 75 kg (165 lb 5.5 oz)  BMI 25.89 kg/m2  Constitutional: Anxious but no acute distress  HENT: Normocephalic, Atraumatic, tympanic membranes are intact and nonerythematous bilaterally, Oropharynx moist without exudates or erythema, Nose normal.   Eyes: PERRLA, EOMI, Conjunctiva normal.  Neck: Supple without " meningismus  Lymphatic: No lymphadenopathy noted.   Cardiovascular: Slightly tachycardic heart rate, Normal rhythm, No murmurs, No rubs, No gallops.   Thorax & Lungs: Normal breath sounds, No respiratory distress, No wheezing, No chest tenderness.   Abdomen: Bowel sounds normal, Soft, No tenderness, no rebound, no guarding, no distention, No masses, No pulsatile masses.   Skin: Warm, Dry, No erythema, No rash.   Back: No tenderness, No CVA tenderness.   Extremities: Atraumatic with symmetric distal pulses, No edema, No tenderness, No cyanosis, No clubbing.   Neurologic: Alert & oriented x 3, cranial nerves II through XII are intact, Normal motor function, Normal sensory function, No focal deficits noted.   Psychiatric: Affect normal, Judgment normal, Mood normal.     COURSE & MEDICAL DECISION MAKING  Pertinent Labs & Imaging studies reviewed. (See chart for details)  This a 32-year-old female who presents with epigastric discomfort. I suspect her symptoms are from esophagitis. The patient's abdomen is benign and she has had significant improvement with the GI cocktail. The bloodwork does not show any evidence of pancreatitis. She's had a cholecystectomy in the past. The patient be discharged clear instructions to continue her proton pump inhibitor and return if she is acutely worse. Based on her age and lack of risk factors a cardiac source of fever unlikely.      EKG interpretation  12-lead EKG interpreted by myself shows a normal sinus rhythm with a ventricular rate of 96, normal QRS, normal intervals, no ST segment elevation or depression, overall normal EKG  FINAL IMPRESSION  1. Epigastric pain    Disposition  The patient will be discharged in stable condition    Electronically signed by: Jaylon Maxwell, 7/11/2017 8:13 PM

## 2017-07-12 NOTE — DISCHARGE INSTRUCTIONS
Abdominal Pain, Women  Abdominal (stomach, pelvic, or belly) pain can be caused by many things. It is important to tell your doctor:  · The location of the pain.  · Does it come and go or is it present all the time?  · Are there things that start the pain (eating certain foods, exercise)?  · Are there other symptoms associated with the pain (fever, nausea, vomiting, diarrhea)?  All of this is helpful to know when trying to find the cause of the pain.  CAUSES   · Stomach: virus or bacteria infection, or ulcer.  · Intestine: appendicitis (inflamed appendix), regional ileitis (Crohn's disease), ulcerative colitis (inflamed colon), irritable bowel syndrome, diverticulitis (inflamed diverticulum of the colon), or cancer of the stomach or intestine.  · Gallbladder disease or stones in the gallbladder.  · Kidney disease, kidney stones, or infection.  · Pancreas infection or cancer.  · Fibromyalgia (pain disorder).  · Diseases of the female organs:  · Uterus: fibroid (non-cancerous) tumors or infection.  · Fallopian tubes: infection or tubal pregnancy.  · Ovary: cysts or tumors.  · Pelvic adhesions (scar tissue).  · Endometriosis (uterus lining tissue growing in the pelvis and on the pelvic organs).  · Pelvic congestion syndrome (female organs filling up with blood just before the menstrual period).  · Pain with the menstrual period.  · Pain with ovulation (producing an egg).  · Pain with an IUD (intrauterine device, birth control) in the uterus.  · Cancer of the female organs.  · Functional pain (pain not caused by a disease, may improve without treatment).  · Psychological pain.  · Depression.  DIAGNOSIS   Your doctor will decide the seriousness of your pain by doing an examination.  · Blood tests.  · X-rays.  · Ultrasound.  · CT scan (computed tomography, special type of X-ray).  · MRI (magnetic resonance imaging).  · Cultures, for infection.  · Barium enema (dye inserted in the large intestine, to better view it with  X-rays).  · Colonoscopy (looking in intestine with a lighted tube).  · Laparoscopy (minor surgery, looking in abdomen with a lighted tube).  · Major abdominal exploratory surgery (looking in abdomen with a large incision).  TREATMENT   The treatment will depend on the cause of the pain.   · Many cases can be observed and treated at home.  · Over-the-counter medicines recommended by your caregiver.  · Prescription medicine.  · Antibiotics, for infection.  · Birth control pills, for painful periods or for ovulation pain.  · Hormone treatment, for endometriosis.  · Nerve blocking injections.  · Physical therapy.  · Antidepressants.  · Counseling with a psychologist or psychiatrist.  · Minor or major surgery.  HOME CARE INSTRUCTIONS   · Do not take laxatives, unless directed by your caregiver.  · Take over-the-counter pain medicine only if ordered by your caregiver. Do not take aspirin because it can cause an upset stomach or bleeding.  · Try a clear liquid diet (broth or water) as ordered by your caregiver. Slowly move to a bland diet, as tolerated, if the pain is related to the stomach or intestine.  · Have a thermometer and take your temperature several times a day, and record it.  · Bed rest and sleep, if it helps the pain.  · Avoid sexual intercourse, if it causes pain.  · Avoid stressful situations.  · Keep your follow-up appointments and tests, as your caregiver orders.  · If the pain does not go away with medicine or surgery, you may try:  · Acupuncture.  · Relaxation exercises (yoga, meditation).  · Group therapy.  · Counseling.  SEEK MEDICAL CARE IF:   · You notice certain foods cause stomach pain.  · Your home care treatment is not helping your pain.  · You need stronger pain medicine.  · You want your IUD removed.  · You feel faint or lightheaded.  · You develop nausea and vomiting.  · You develop a rash.  · You are having side effects or an allergy to your medicine.  SEEK IMMEDIATE MEDICAL CARE IF:   · Your  pain does not go away or gets worse.  · You have a fever.  · Your pain is felt only in portions of the abdomen. The right side could possibly be appendicitis. The left lower portion of the abdomen could be colitis or diverticulitis.  · You are passing blood in your stools (bright red or black tarry stools, with or without vomiting).  · You have blood in your urine.  · You develop chills, with or without a fever.  · You pass out.  MAKE SURE YOU:   · Understand these instructions.  · Will watch your condition.  · Will get help right away if you are not doing well or get worse.  Document Released: 10/14/2008 Document Revised: 03/11/2013 Document Reviewed: 11/04/2010  [employer]® Patient Information ©2014 [employer], [email or web address].

## 2017-07-17 ENCOUNTER — OFFICE VISIT (OUTPATIENT)
Dept: MEDICAL GROUP | Facility: LAB | Age: 32
End: 2017-07-17
Payer: COMMERCIAL

## 2017-07-17 VITALS
BODY MASS INDEX: 25.74 KG/M2 | WEIGHT: 164 LBS | OXYGEN SATURATION: 98 % | DIASTOLIC BLOOD PRESSURE: 70 MMHG | SYSTOLIC BLOOD PRESSURE: 110 MMHG | TEMPERATURE: 98 F | RESPIRATION RATE: 12 BRPM | HEART RATE: 70 BPM | HEIGHT: 67 IN

## 2017-07-17 DIAGNOSIS — H02.826 SEBACEOUS CYST OF LEFT EYELID: ICD-10-CM

## 2017-07-17 DIAGNOSIS — L82.1 SEBORRHEIC KERATOSIS: ICD-10-CM

## 2017-07-17 DIAGNOSIS — K20.0 EOSINOPHILIC ESOPHAGITIS: ICD-10-CM

## 2017-07-17 PROCEDURE — 99214 OFFICE O/P EST MOD 30 MIN: CPT | Performed by: NURSE PRACTITIONER

## 2017-07-17 NOTE — ASSESSMENT & PLAN NOTE
She is concerned about a small area on the left side of her upper eye lid , it is not painful but is not resolving. It has been present for about two weeks. She has tried moist heat but it is not going away.

## 2017-07-17 NOTE — PROGRESS NOTES
Chief Complaint   Patient presents with   • Nevus     back and inner thigh   • Eye Problem     left corner X 2 months   • Abdominal Pain       HISTORY OF PRESENT ILLNESS: Patient is a 32 y.o. female established patient of Dr. Evans who presents today to discuss the following problems.        Eosinophilic esophagitis  She was seen in the ER for abdominal pain. She has seen her GI doctor and he is now taking 40 mg of Prilosec this is resolved her symptoms    Seborrheic keratosis  Patient has 3 skin lesions that she's been concerned about on her back left shoulder and left leg.    Sebaceous cyst of left eyelid  She is concerned about a small area on the left side of her upper eye lid , it is not painful but is not resolving. It has been present for about two weeks. She has tried moist heat but it is not going away.       Patient Active Problem List    Diagnosis Date Noted   • Seborrheic keratosis 07/17/2017   • Sebaceous cyst of left eyelid 07/17/2017   • Neck muscle spasm 04/06/2017   • Acute pain of right knee 04/06/2017   • Vitamin D deficiency 04/06/2017   • Lethargy 02/23/2017   • Eosinophilic esophagitis 02/23/2017   • Anxiety 02/23/2017       Allergies:Vicodin    Current Outpatient Prescriptions   Medication Sig Dispense Refill   • multivitamin (THERAGRAN) Tab Take 1 Tab by mouth every day.     • vitamin D (CHOLECALCIFEROL) 1000 UNIT Tab Take 1,000 Units by mouth every day.     • cyclobenzaprine (FLEXERIL) 10 MG Tab Take 1 Tab by mouth 3 times a day as needed. 90 Tab 1   • omeprazole (PRILOSEC) 40 MG delayed-release capsule Take 40 mg by mouth every day.     • ibuprofen (MOTRIN) 800 MG Tab Take 800 mg by mouth every 8 hours as needed.     • alprazolam (XANAX) 1 MG Tab Take 1 Tab by mouth 1 time daily as needed for Anxiety. 30 Tab 1   • scopolamine (TRANSDERM-SCOP) 1.5 MG/3DAYS PATCH 72 HR Apply 1 Patch to skin as directed every 72 hours. 4 Patch 0   • ondansetron (ZOFRAN ODT) 8 MG TABLET DISPERSIBLE Take 1 Tab  "by mouth every 8 hours as needed for Nausea/Vomiting. 15 Tab 0     No current facility-administered medications for this visit.       Social History   Substance Use Topics   • Smoking status: Never Smoker    • Smokeless tobacco: Never Used   • Alcohol Use: 1.8 oz/week     3 Glasses of wine per week       Family Status   Relation Status Death Age   • Mother Alive    • Father Alive      Family History   Problem Relation Age of Onset   • Thyroid Mother    • Hypertension Mother    • Hyperlipidemia Mother    • Thyroid Paternal Aunt    • Thyroid Maternal Grandmother    • No Known Problems Father    • Heart Disease Paternal Grandfather      MI        ROS:  Review of Systems   Constitutional: Negative for fever, chills, weight loss and malaise/fatigue.   HENT: Negative for ear pain, nosebleeds,     Respiratory: Negative for cough, sputum production, shortness of breath and wheezing.    Cardiovascular: Negative for chest pain, palpitations,  Gastrointestinal: No further abdominal pain    Skin: Negative for rash and itching. She is concerned about skin lesions and a bump on her left eyelid         Exam:  Blood pressure 110/70, pulse 70, temperature 36.7 °C (98 °F), resp. rate 12, height 1.689 m (5' 6.5\"), weight 74.39 kg (164 lb), SpO2 98 %.  General:  Well nourished, well developed female in NAD  Head left upper  eyelid lateral, 3 mm firm white lesion at the edge of the lateral eyelid consistent with small sebaceous cyst in  Neck: Supple without cervical lymphadenopathy  Pulmonary: Clear to ausculation   Normal effort. No rales, ronchi, or wheezing.  Cardiovascular: Regular rate and rhythm without murmur. ShExtremities: no clubbing, cyanosis, or edema.  Skin: 8mm raised brown lesion on the mid back, 4 mm raised lesion on the left shoulder and a brown raised 4 mm lesion on the left leg all consistent with seborrheic keratosis    ER records are reviewed  Please note that this dictation was created using voice recognition " software. I have made every reasonable attempt to correct obvious errors, but I expect that there are errors of grammar and possibly content that I did not discover before finalizing the note.    Assessment/Plan:  1. Eosinophilic esophagitis   much improved continue current dose of omeprazole follow-up with gastroenterology follow-up as needed if symptoms worsen, return to the ER if symptoms are severe, may use liquid antacids or heat if unable to access the ER immediately    2. Seborrheic keratosis   no need for further treatment continue to follow    3. Sebaceous cyst of left eyelid   moist heat continue to observe no need for further treatment

## 2017-07-17 NOTE — ASSESSMENT & PLAN NOTE
Patient has 3 skin lesions that she's been concerned about on her back left shoulder and left leg.

## 2017-07-17 NOTE — MR AVS SNAPSHOT
"        Carlotta VILLANUEVA Beth   2017 2:20 PM   Office Visit   MRN: [id number]    Department:  [facility]   Dept Phone:  [phone]    Description:  Female : 1985   Provider:  CHUYITA Koenig           Reason for Visit     Nevus back and inner thigh    Eye Problem left corner X 2 months    Abdominal Pain           Allergies as of 2017     Allergen Noted Reactions    Vicodin [Hydrocodone-Acetaminophen] 2017   Vomiting      You were diagnosed with     Eosinophilic esophagitis   [530.13.ICD-9-CM]       Seborrheic keratosis   [5012345]       Sebaceous cyst of left eyelid   [8057070]         Vital Signs     Blood Pressure Pulse Temperature Respirations Height Weight    110/70 mmHg 70 36.7 °C (98 °F) 12 1.689 m (5' 6.5\") 74.39 kg (164 lb)    Body Mass Index Oxygen Saturation Smoking Status             26.08 kg/m2 98% Never Smoker          Basic Information     Date Of Birth Sex Race Ethnicity Preferred Language    1985 Female White Non- English      Your appointments     Aug 15, 2017  9:40 AM   Established Patient with Kallie Corey M.D.   [facility] (--)    [address] [address] [phone]           You will be receiving a confirmation call a few days before your appointment from our automated call confirmation system.              Problem List              ICD-10-CM Priority Class Noted - Resolved    Lethargy R53.83   2017 - Present    Eosinophilic esophagitis K20.0   2017 - Present    Anxiety F41.9   2017 - Present    Neck muscle spasm M62.838   2017 - Present    Acute pain of right knee M25.561   2017 - Present    Vitamin D deficiency E55.9   2017 - Present    Seborrheic keratosis L82.1   2017 - Present    Sebaceous cyst of left eyelid H02.826   2017 - Present      Health Maintenance        Date Due Completion Dates    IMM INFLUENZA (1) 2017 ---    PAP SMEAR " 10/27/2018 10/27/2015    IMM DTaP/Tdap/Td Vaccine (6 - Td) 4/6/2027 4/6/2017, 7/10/1990, 5/1/1989, 4/4/1989, 11/5/1987, 1985            Current Immunizations     Hepatitis B Vaccine Non-Recombivax (Ped/Adol) 9/16/1998, 4/8/1998, 3/10/1998    MMR Vaccine 8/13/1990, 4/4/1989    OPV - Historical Data 8/13/1990, 4/4/1989, 1985    Tdap Vaccine 4/6/2017    dT Vaccine 7/10/1990, 5/1/1989, 4/4/1989, 11/5/1987, 1985      Below and/or attached are the medications your provider expects you to take. Review all of your home medications and newly ordered medications with your provider and/or pharmacist. Follow medication instructions as directed by your provider and/or pharmacist. Please keep your medication list with you and share with your provider. Update the information when medications are discontinued, doses are changed, or new medications (including over-the-counter products) are added; and carry medication information at all times in the event of emergency situations     Allergies:  VICODIN - Vomiting               Medications  Valid as of: July 17, 2017 -  3:14 PM    Generic Name Brand Name Tablet Size Instructions for use    ALPRAZolam (Tab) XANAX 1 MG Take 1 Tab by mouth 1 time daily as needed for Anxiety.        Cholecalciferol (Tab) cholecalciferol 1000 UNIT Take 1,000 Units by mouth every day.        Cyclobenzaprine HCl (Tab) FLEXERIL 10 MG Take 1 Tab by mouth 3 times a day as needed.        Ibuprofen (Tab) MOTRIN 800 MG Take 800 mg by mouth every 8 hours as needed.        Multiple Vitamin (Tab) THERAGRAN  Take 1 Tab by mouth every day.        Omeprazole (CAPSULE DELAYED RELEASE) PRILOSEC 40 MG Take 40 mg by mouth every day.        Ondansetron (TABLET DISPERSIBLE) ZOFRAN ODT 8 MG Take 1 Tab by mouth every 8 hours as needed for Nausea/Vomiting.        Scopolamine Base (PATCH 72 HR) TRANSDERM-SCOP 1.5 MG/3DAYS Apply 1 Patch to skin as directed every 72 hours.        .                 Medicines prescribed  today were sent to:     [facility]Y #[id number] - [place], [place] - [address]    [address] [place] [address]    Phone: [phone] Fax: [phone]    Open 24 Hours?: No      Medication refill instructions:       If your prescription bottle indicates you have medication refills left, it is not necessary to call your provider’s office. Please contact your pharmacy and they will refill your medication.    If your prescription bottle indicates you do not have any refills left, you may request refills at any time through one of the following ways: The online [employer] system (except Urgent Care), by calling your provider’s office, or by asking your pharmacy to contact your provider’s office with a refill request. Medication refills are processed only during regular business hours and may not be available until the next business day. Your provider may request additional information or to have a follow-up visit with you prior to refilling your medication.   *Please Note: Medication refills are assigned a new Rx number when refilled electronically. Your pharmacy may indicate that no refills were authorized even though a new prescription for the same medication is available at the pharmacy. Please request the medicine by name with the pharmacy before contacting your provider for a refill.           [employer] Access Code: Activation code not generated  Current [employer] Status: Active

## 2017-08-15 ENCOUNTER — OFFICE VISIT (OUTPATIENT)
Dept: MEDICAL GROUP | Facility: LAB | Age: 32
End: 2017-08-15
Payer: COMMERCIAL

## 2017-08-15 VITALS
WEIGHT: 167 LBS | BODY MASS INDEX: 26.84 KG/M2 | HEART RATE: 84 BPM | SYSTOLIC BLOOD PRESSURE: 106 MMHG | RESPIRATION RATE: 12 BRPM | DIASTOLIC BLOOD PRESSURE: 62 MMHG | OXYGEN SATURATION: 97 % | HEIGHT: 66 IN | TEMPERATURE: 98 F

## 2017-08-15 DIAGNOSIS — E66.3 OVERWEIGHT (BMI 25.0-29.9): ICD-10-CM

## 2017-08-15 DIAGNOSIS — M62.838 NECK MUSCLE SPASM: ICD-10-CM

## 2017-08-15 PROCEDURE — 99214 OFFICE O/P EST MOD 30 MIN: CPT | Performed by: FAMILY MEDICINE

## 2017-08-15 RX ORDER — CARISOPRODOL 250 MG/1
1 TABLET ORAL
Qty: 30 TAB | Refills: 1 | Status: SHIPPED | OUTPATIENT
Start: 2017-08-15 | End: 2018-01-28 | Stop reason: SDUPTHER

## 2017-08-15 NOTE — MR AVS SNAPSHOT
"Carlotta Campos   8/15/2017 9:40 AM   Office Visit   MRN: [id number]    Department:  [facility]   Dept Phone:  [phone]    Description:  Female : 1985   Provider:  Kallie Corey M.D.           Reason for Visit     Follow-Up weight      Allergies as of 8/15/2017     Allergen Noted Reactions    Vicodin [Hydrocodone-Acetaminophen] 2017   Vomiting      You were diagnosed with     Neck muscle spasm   [478011]       Overweight (BMI 25.0-29.9)   [957786]         Vital Signs     Blood Pressure Pulse Temperature Respirations Height Weight    106/62 mmHg 84 36.7 °C (98 °F) 12 1.689 m (5' 6.5\") 75.751 kg (167 lb)    Body Mass Index Oxygen Saturation Smoking Status             26.55 kg/m2 97% Never Smoker          Basic Information     Date Of Birth Sex Race Ethnicity Preferred Language    1985 Female White Non- English      Your appointments     Nov 15, 2017  4:00 PM   Established Patient with Kallie Corey M.D.   [facility] (--)    [address] [id number] [phone]           You will be receiving a confirmation call a few days before your appointment from our automated call confirmation system.              Problem List              ICD-10-CM Priority Class Noted - Resolved    Lethargy R53.83   2017 - Present    Eosinophilic esophagitis K20.0   2017 - Present    Anxiety F41.9   2017 - Present    Neck muscle spasm M62.838   2017 - Present    Acute pain of right knee M25.561   2017 - Present    Vitamin D deficiency E55.9   2017 - Present    Seborrheic keratosis L82.1   2017 - Present    Sebaceous cyst of left eyelid H02.826   2017 - Present    Overweight (BMI 25.0-29.9) E66.3   8/15/2017 - Present      Health Maintenance        Date Due Completion Dates    IMM INFLUENZA (1) 2017 ---    PAP SMEAR 10/27/2018 10/27/2015    IMM DTaP/Tdap/Td Vaccine (6 - Td) 2027 " 4/6/2017, 7/10/1990, 5/1/1989, 4/4/1989, 11/5/1987, 1985            Current Immunizations     Hepatitis B Vaccine Non-Recombivax (Ped/Adol) 9/16/1998, 4/8/1998, 3/10/1998    MMR Vaccine 8/13/1990, 4/4/1989    OPV - Historical Data 8/13/1990, 4/4/1989, 1985    Tdap Vaccine 4/6/2017    dT Vaccine 7/10/1990, 5/1/1989, 4/4/1989, 11/5/1987, 1985      Below and/or attached are the medications your provider expects you to take. Review all of your home medications and newly ordered medications with your provider and/or pharmacist. Follow medication instructions as directed by your provider and/or pharmacist. Please keep your medication list with you and share with your provider. Update the information when medications are discontinued, doses are changed, or new medications (including over-the-counter products) are added; and carry medication information at all times in the event of emergency situations     Allergies:  VICODIN - Vomiting               Medications  Valid as of: August 15, 2017 - 10:10 AM    Generic Name Brand Name Tablet Size Instructions for use    ALPRAZolam (Tab) XANAX 1 MG Take 1 Tab by mouth 1 time daily as needed for Anxiety.        Carisoprodol (Tab) Carisoprodol 250 MG Take 1 Tab by mouth 1 time daily as needed (muscle spasm).        Cholecalciferol (Tab) cholecalciferol 1000 UNIT Take 1,000 Units by mouth every day.        Ibuprofen (Tab) MOTRIN 800 MG Take 800 mg by mouth every 8 hours as needed.        Multiple Vitamin (Tab) THERAGRAN  Take 1 Tab by mouth every day.        Naltrexone-Bupropion HCl (TABLET SR 12 HR) Naltrexone-Bupropion HCl ER 8-90 MG Take 2 Tabs by mouth 2 Times a Day.        Omeprazole (CAPSULE DELAYED RELEASE) PRILOSEC 40 MG Take 40 mg by mouth every day.        Ondansetron (TABLET DISPERSIBLE) ZOFRAN ODT 8 MG Take 1 Tab by mouth every 8 hours as needed for Nausea/Vomiting.        .                 Medicines prescribed today were sent to:     [facility]/PHARMACY #[id number] -  [place], [place] - [address]    [address] [place] [address]    Phone: [phone] Fax: [phone]    Open 24 Hours?: No      Medication refill instructions:       If your prescription bottle indicates you have medication refills left, it is not necessary to call your provider’s office. Please contact your pharmacy and they will refill your medication.    If your prescription bottle indicates you do not have any refills left, you may request refills at any time through one of the following ways: The online [employer] system (except Urgent Care), by calling your provider’s office, or by asking your pharmacy to contact your provider’s office with a refill request. Medication refills are processed only during regular business hours and may not be available until the next business day. Your provider may request additional information or to have a follow-up visit with you prior to refilling your medication.   *Please Note: Medication refills are assigned a new Rx number when refilled electronically. Your pharmacy may indicate that no refills were authorized even though a new prescription for the same medication is available at the pharmacy. Please request the medicine by name with the pharmacy before contacting your provider for a refill.        Instructions    One tablet once daily in the morning for 1 week; at week 2, increase to 1 tablet twice daily administered in the morning and evening and continue for 1 week; at week 3, increase to 2 tablets in the morning and 1 tablet in the evening and continue for 1 week; at week 4, increase to 2 tablets twice daily administered in the morning and evening and continue for the remainder of the treatment course.          [employer] Access Code: Activation code not generated  Current [employer] Status: Active
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
## 2017-10-06 DIAGNOSIS — E66.3 OVERWEIGHT (BMI 25.0-29.9): ICD-10-CM

## 2017-10-07 NOTE — TELEPHONE ENCOUNTER
Was the patient seen in the last year in this department? Yes Lov 8/15/17    Does patient have an active prescription for medications requested? No     Received Request Via: Pharmacy

## 2017-10-08 RX ORDER — NALTREXONE HYDROCHLORIDE AND BUPROPION HYDROCHLORIDE 8; 90 MG/1; MG/1
TABLET, EXTENDED RELEASE ORAL
Qty: 180 TAB | Refills: 0 | Status: SHIPPED | OUTPATIENT
Start: 2017-10-08 | End: 2017-11-15

## 2017-11-11 DIAGNOSIS — E66.3 OVERWEIGHT (BMI 25.0-29.9): ICD-10-CM

## 2017-11-13 NOTE — TELEPHONE ENCOUNTER
Was the patient seen in the last year in this department? Yes Lov 8/19/2017    Does patient have an active prescription for medications requested? No     Received Request Via: Pharmacy

## 2017-11-15 ENCOUNTER — OFFICE VISIT (OUTPATIENT)
Dept: MEDICAL GROUP | Facility: LAB | Age: 32
End: 2017-11-15
Payer: COMMERCIAL

## 2017-11-15 VITALS
BODY MASS INDEX: 24.01 KG/M2 | HEIGHT: 67 IN | OXYGEN SATURATION: 99 % | HEART RATE: 76 BPM | RESPIRATION RATE: 16 BRPM | TEMPERATURE: 98.4 F | SYSTOLIC BLOOD PRESSURE: 106 MMHG | DIASTOLIC BLOOD PRESSURE: 64 MMHG | WEIGHT: 153 LBS

## 2017-11-15 DIAGNOSIS — Z31.69 INFERTILITY COUNSELING: ICD-10-CM

## 2017-11-15 DIAGNOSIS — M62.838 NECK MUSCLE SPASM: ICD-10-CM

## 2017-11-15 DIAGNOSIS — E66.3 OVERWEIGHT (BMI 25.0-29.9): ICD-10-CM

## 2017-11-15 DIAGNOSIS — Z23 NEED FOR VACCINATION: ICD-10-CM

## 2017-11-15 DIAGNOSIS — M65.4 DE QUERVAIN'S TENOSYNOVITIS, BILATERAL: ICD-10-CM

## 2017-11-15 PROCEDURE — 90471 IMMUNIZATION ADMIN: CPT | Performed by: FAMILY MEDICINE

## 2017-11-15 PROCEDURE — 99214 OFFICE O/P EST MOD 30 MIN: CPT | Mod: 25 | Performed by: FAMILY MEDICINE

## 2017-11-15 PROCEDURE — 90686 IIV4 VACC NO PRSV 0.5 ML IM: CPT | Performed by: FAMILY MEDICINE

## 2017-11-15 RX ORDER — COPPER 313.4 MG/1
1 INTRAUTERINE DEVICE INTRAUTERINE ONCE
Status: SHIPPED | DISCHARGE
Start: 2017-11-15 | End: 2017-11-15

## 2017-11-16 NOTE — PROGRESS NOTES
Subjective:   Carlotta Campos is a 32 y.o. female here today for   Chief Complaint   Patient presents with   • Follow-Up     weight loss   • Hand Pain     left x 3 days       1. Overweight (BMI 25.0-29.9)  Chronic  Has lost 14lbs in 3 months   No SE from Contrave   Exercising regularly  Logging her calories     2. Neck muscle spasm  Chronic  Using Soma as needed  She states her previous dose was 350mg as needed  The 250mg is working OK, but she would like to increase if possible     3. De Quervain's tenosynovitis, bilateral  This is a new problem to discuss today. Over the last one month, she has noticed pain in bilateral thumbs radiating up into her forearm. She has been doing a little more frequently. She also got a bigger phone and is having a difficult time taxing with her thumbs with this. She is taking ibuprofen as needed. Stretching.    4. Infertility counseling  Patient is concerned about her long-term fertility. If she is in a monogamous relationship. They do not plan to have kids for 2-3 more years. She would like a consultation with a fertility specialist. She currently has the ParaGard IUD    Current medicines (including changes today)  Current Outpatient Prescriptions   Medication Sig Dispense Refill   • PARAGARD INTRAUTERINE COPPER IUD 1 Device by Intrauterine route Once for 1 dose.     • Naltrexone-Bupropion HCl ER (CONTRAVE) 8-90 MG TABLET SR 12 HR Take 2 Tabs by mouth 2 times a day. 180 Tab 3   • Carisoprodol 250 MG Tab Take 1 Tab by mouth 1 time daily as needed (muscle spasm). 30 Tab 1   • multivitamin (THERAGRAN) Tab Take 1 Tab by mouth every day.     • vitamin D (CHOLECALCIFEROL) 1000 UNIT Tab Take 1,000 Units by mouth every day.     • omeprazole (PRILOSEC) 40 MG delayed-release capsule Take 40 mg by mouth every day.     • ibuprofen (MOTRIN) 800 MG Tab Take 800 mg by mouth every 8 hours as needed.     • alprazolam (XANAX) 1 MG Tab Take 1 Tab by mouth 1 time daily as needed for Anxiety. 30 Tab 1  "    No current facility-administered medications for this visit.      She  has a past medical history of Eosinophilic esophagitis (2/23/2017); Panic attack; Psychiatric disorder; Sebaceous cyst of left eyelid (7/17/2017); and Seborrheic keratosis (7/17/2017).    ROS   No fevers  No bowel changes  No LE edema       Objective:     Blood pressure 106/64, pulse 76, temperature 36.9 °C (98.4 °F), resp. rate 16, height 1.689 m (5' 6.5\"), weight 69.4 kg (153 lb), SpO2 99 %. Body mass index is 24.32 kg/m².  Physical Exam:  Constitutional: Alert, no distress.  Skin: Warm, dry, good turgor, no rashes in visible areas.  Eye: Equal, round and reactive, conjunctiva clear, lids normal.  ENMT: Lips without lesions, good dentition, oropharynx clear.  Neck: Trachea midline, no masses, no thyromegaly. No cervical or supraclavicular lymphadenopathy  Respiratory: Unlabored respiratory effort, lungs clear to auscultation, no wheezes, no ronchi.  Cardiovascular: Normal S1, S2, RRR, no murmur, no edema.  Abdomen: Soft, non-tender, no masses, no hepatosplenomegaly.  Psych: Alert and oriented x3, normal affect and mood.  MSK: Tenderness to palpation along the tendons originating from the lateral thumbs. Strength in the hands 5 out of 5.      Assessment and Plan:   The following treatment plan was discussed    1. Overweight (BMI 25.0-29.9)  Chronic, resolved. 14 pound weight loss. We will continue contrav for 3 more months through the holidays. We discussed that we will need to wean this medication      2. Neck muscle spasm  Chronic, stable on Soma. We'll need to increase this to 350 mg with the next refill    3. De Quervain's tenosynovitis, bilateral  This is a new problem. Handouts given on exercises. Treat with NSAIDs as needed    4. Infertility counseling  This is a new discussion, patient would like to have infertility counseling. I have referred her to reproductive endocrinology  - REFERRAL TO GYNECOLOGY    5. Need for vaccination  - " INFLUENZA VACCINE QUAD INJ >3Y(PF)      Followup: Return in about 3 months (around 2/15/2018) for wean off of contrave .       This note was created using voice recognition software. I have made every reasonable attempt to correct errors, however, I do anticipate some grammatical errors.

## 2017-11-16 NOTE — PATIENT INSTRUCTIONS
De Quervain's Tenosynovitis  De Quervain's tenosynovitis involves inflammation of one or two tendon linings (sheaths) or strain of one or two tendons to the thumb: extensor pollicis brevis (EPB), or abductor pollicis longus (APL). This causes pain on the side of the wrist and base of the thumb. Tendon sheaths secrete a fluid that lubricates the tendon, allowing the tendon to move smoothly. When the sheath becomes inflamed, the tendon cannot move freely in the sheath. Both the EPB and APL tendons are important for proper use of the hand. The EPB tendon is important for straightening the thumb. The APL tendon is important for moving the thumb away from the index finger (abducting). The two tendons pass through a small tube (canal) in the wrist, near the base of the thumb. When the tendons become inflamed, pain is usually felt in this area.  SYMPTOMS   · Pain, tenderness, swelling, warmth, or redness over the base of the thumb and thumb side of the wrist.  · Pain that gets worse when straightening the thumb.  · Pain that gets worse when moving the thumb away from the index finger, against resistance.  · Pain with pinching or gripping.  · Locking or catching of the thumb.  · Limited motion of the thumb.  · Crackling sound (crepitation) when the tendon or thumb is moved or touched.  · Fluid-filled cyst in the area of the base of the thumb.  CAUSES   · Tenosynovitis is often linked with overuse of the wrist.  · Tenosynovitis may be caused by repeated injury to the thumb muscle and tendon units, and with repeated motions of the hand and wrist, due to friction of the tendon within the lining (sheath).  · Tenosynovitis may also be due to a sudden increase in activity or change in activity.  RISK INCREASES WITH:  · Sports that involve repeated hand and wrist motions (golf, bowling, tennis, squash, racquetball).  · Heavy labor.  · Poor physical wrist strength and flexibility.  · Failure to warm up properly before practice or  play.  · Female gender.  · New mothers who hold their baby's head for long periods or lift infants with thumbs in the infant's armpit (axilla).  PREVENTION  · Warm up and stretch properly before practice or competition.  · Allow enough time for rest and recovery between practices and competition.  · Maintain appropriate conditioning:  ¨ Cardiovascular fitness.  ¨ Forearm, wrist, and hand flexibility.  ¨ Muscle strength and endurance.  · Use proper exercise technique.  PROGNOSIS   This condition is usually curable within 6 weeks, if treated properly with non-surgical treatment and resting of the affected area.   RELATED COMPLICATIONS   · Longer healing time if not properly treated or if not given enough time to heal.  · Chronic inflammation, causing recurring symptoms of tenosynovitis. Permanent pain or restriction of movement.  · Risks of surgery: infection, bleeding, injury to nerves (numbness of the thumb), continued pain, incomplete release of the tendon sheath, recurring symptoms, cutting of the tendons, tendons sliding out of position, weakness of the thumb, thumb stiffness.  TREATMENT   First, treatment involves the use of medicine and ice, to reduce pain and inflammation. Patients are encouraged to stop or modify activities that aggravate the injury. Stretching and strengthening exercises may be advised. Exercises may be completed at home or with a therapist. You may be fitted with a brace or splint, to limit motion and allow the injury to heal. Your caregiver may also choose to give you a corticosteroid injection, to reduce the pain and inflammation. If non-surgical treatment is not successful, surgery may be needed. Most tenosynovitis surgeries are done as outpatient procedures (you go home the same day). Surgery may involve local, regional (whole arm), or general anesthesia.   MEDICATION   · If pain medicine is needed, nonsteroidal anti-inflammatory medicines (aspirin and ibuprofen), or other minor pain  relievers (acetaminophen), are often advised.  · Do not take pain medicine for 7 days before surgery.  · Prescription pain relievers are often prescribed only after surgery. Use only as directed and only as much as you need.  · Corticosteroid injections may be given if your caregiver thinks they are needed. There is a limited number of times these injections may be given.  COLD THERAPY   · Cold treatment (icing) should be applied for 10 to 15 minutes every 2 to 3 hours for inflammation and pain, and immediately after activity that aggravates your symptoms. Use ice packs or an ice massage.  SEEK MEDICAL CARE IF:   · Symptoms get worse or do not improve in 2 to 4 weeks, despite treatment.  · You experience pain, numbness, or coldness in the hand.  · Blue, gray, or dark color appears in the fingernails.  · Any of the following occur after surgery: increased pain, swelling, redness, drainage of fluids, bleeding in the affected area, or signs of infection.  · New, unexplained symptoms develop. (Drugs used in treatment may produce side effects.)     This information is not intended to replace advice given to you by your health care provider. Make sure you discuss any questions you have with your health care provider.     Document Released: 12/18/2006 Document Revised: 03/11/2013 Document Reviewed: 02/23/2016  [employer] Interactive Patient Education ©2016 [employer]

## 2018-01-28 DIAGNOSIS — F41.9 ANXIETY: ICD-10-CM

## 2018-01-28 DIAGNOSIS — M62.838 NECK MUSCLE SPASM: ICD-10-CM

## 2018-01-29 NOTE — TELEPHONE ENCOUNTER
Was the patient seen in the last year in this department? Yes    Last Fill 11/30/17 #30  Does patient have an active prescription for medications requested? No     Received Request Via: Pharmacy

## 2018-01-30 RX ORDER — CARISOPRODOL 250 MG/1
TABLET ORAL
Qty: 30 TAB | Refills: 1 | Status: SHIPPED
Start: 2018-01-30 | End: 2018-02-07 | Stop reason: SDUPTHER

## 2018-01-31 NOTE — TELEPHONE ENCOUNTER
Prescription called in to:    [facility]/PHARMACY #[id number] - [place] - [address]  [address] [address]  Phone: [phone] Fax: [phone]  .

## 2018-02-02 DIAGNOSIS — M62.838 NECK MUSCLE SPASM: ICD-10-CM

## 2018-02-02 NOTE — TELEPHONE ENCOUNTER
Was the patient seen in the last year in this department? Yes Lov 11/15/17  11/30/2017    Does patient have an active prescription for medications requested? No     Received Request Via: Pharmacy

## 2018-02-05 RX ORDER — CARISOPRODOL 250 MG/1
TABLET ORAL
Qty: 30 TAB | Refills: 1
Start: 2018-02-05 | End: 2018-03-07

## 2018-02-07 DIAGNOSIS — M62.838 NECK MUSCLE SPASM: ICD-10-CM

## 2018-02-08 RX ORDER — CARISOPRODOL 250 MG/1
TABLET ORAL
Qty: 30 TAB | Refills: 2 | Status: SHIPPED
Start: 2018-02-08 | End: 2018-03-10

## 2018-02-08 NOTE — TELEPHONE ENCOUNTER
Prescription faxed to:    [facility]/PHARMACY #[id number] - [place] - [address]  [address] [address]  Phone: [phone] Fax: [phone]  .

## 2018-03-07 DIAGNOSIS — F41.9 ANXIETY: ICD-10-CM

## 2018-03-07 RX ORDER — ALPRAZOLAM 1 MG/1
TABLET ORAL
Qty: 30 TAB | Refills: 5 | Status: SHIPPED
Start: 2018-03-07 | End: 2018-04-06

## 2018-03-07 NOTE — TELEPHONE ENCOUNTER
Prescription faxed to:    [facility]/PHARMACY #[id number] - [place] - [address]  [address] [address]  Phone: [phone] Fax: [phone]  .

## 2018-03-07 NOTE — TELEPHONE ENCOUNTER
Was the patient seen in the last year in this department? Yes     Does patient have an active prescription for medications requested? No     Received Request Via: Pharmacy     Per  last fill: 2-23-17 # 30

## 2018-03-15 ENCOUNTER — OFFICE VISIT (OUTPATIENT)
Dept: MEDICAL GROUP | Facility: LAB | Age: 33
End: 2018-03-15
Payer: COMMERCIAL

## 2018-03-15 VITALS
HEIGHT: 67 IN | BODY MASS INDEX: 23.86 KG/M2 | OXYGEN SATURATION: 98 % | DIASTOLIC BLOOD PRESSURE: 66 MMHG | HEART RATE: 80 BPM | SYSTOLIC BLOOD PRESSURE: 108 MMHG | WEIGHT: 152 LBS | TEMPERATURE: 98.1 F | RESPIRATION RATE: 12 BRPM

## 2018-03-15 DIAGNOSIS — F41.9 ANXIETY: ICD-10-CM

## 2018-03-15 DIAGNOSIS — E55.9 VITAMIN D DEFICIENCY: ICD-10-CM

## 2018-03-15 DIAGNOSIS — R73.01 IMPAIRED FASTING GLUCOSE: ICD-10-CM

## 2018-03-15 DIAGNOSIS — E66.3 OVERWEIGHT (BMI 25.0-29.9): ICD-10-CM

## 2018-03-15 DIAGNOSIS — Z83.49 FAMILY HISTORY OF THYROID DISEASE: ICD-10-CM

## 2018-03-15 DIAGNOSIS — Z31.89 ENCOUNTER FOR FERTILITY PLANNING: ICD-10-CM

## 2018-03-15 DIAGNOSIS — M62.838 NECK MUSCLE SPASM: ICD-10-CM

## 2018-03-15 PROCEDURE — 99214 OFFICE O/P EST MOD 30 MIN: CPT | Performed by: FAMILY MEDICINE

## 2018-03-15 RX ORDER — CARISOPRODOL 350 MG/1
350 TABLET ORAL
Qty: 30 TAB | Refills: 5 | Status: SHIPPED | OUTPATIENT
Start: 2018-03-15 | End: 2018-04-14

## 2018-03-15 ASSESSMENT — PATIENT HEALTH QUESTIONNAIRE - PHQ9: CLINICAL INTERPRETATION OF PHQ2 SCORE: 0

## 2018-03-15 NOTE — PATIENT INSTRUCTIONS
For Contrave: 1 pill twice a day for 2 weeks, then decrease to 1 pill in the morning for 2 weeks then you could stop
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
## 2018-04-30 DIAGNOSIS — E66.3 OVERWEIGHT (BMI 25.0-29.9): ICD-10-CM

## 2018-04-30 RX ORDER — NALTREXONE HYDROCHLORIDE AND BUPROPION HYDROCHLORIDE 8; 90 MG/1; MG/1
2 TABLET, EXTENDED RELEASE ORAL 2 TIMES DAILY
Qty: 180 TAB | Refills: 3 | OUTPATIENT
Start: 2018-04-30

## 2018-05-29 ENCOUNTER — PATIENT MESSAGE (OUTPATIENT)
Dept: MEDICAL GROUP | Facility: LAB | Age: 33
End: 2018-05-29

## 2018-05-29 DIAGNOSIS — F41.9 ANXIETY: ICD-10-CM

## 2018-05-29 DIAGNOSIS — R73.01 IMPAIRED FASTING GLUCOSE: ICD-10-CM

## 2018-05-29 DIAGNOSIS — E55.9 VITAMIN D DEFICIENCY: ICD-10-CM

## 2018-05-29 DIAGNOSIS — Z00.00 HEALTH CARE MAINTENANCE: ICD-10-CM

## 2018-06-24 DIAGNOSIS — E66.3 OVERWEIGHT (BMI 25.0-29.9): ICD-10-CM

## 2018-06-25 NOTE — TELEPHONE ENCOUNTER
Was the patient seen in the last year in this department? yes    Does patient have an active prescription for medications requested? No     Received Request Via: Pharmacy

## 2018-07-12 ENCOUNTER — HOSPITAL ENCOUNTER (OUTPATIENT)
Dept: LAB | Facility: MEDICAL CENTER | Age: 33
End: 2018-07-12
Attending: FAMILY MEDICINE
Payer: COMMERCIAL

## 2018-07-12 DIAGNOSIS — E66.3 OVERWEIGHT (BMI 25.0-29.9): ICD-10-CM

## 2018-07-12 DIAGNOSIS — E55.9 VITAMIN D DEFICIENCY: ICD-10-CM

## 2018-07-12 DIAGNOSIS — Z00.00 HEALTH CARE MAINTENANCE: ICD-10-CM

## 2018-07-12 DIAGNOSIS — R73.01 IMPAIRED FASTING GLUCOSE: ICD-10-CM

## 2018-07-12 DIAGNOSIS — F41.9 ANXIETY: ICD-10-CM

## 2018-07-12 LAB
ALBUMIN SERPL BCP-MCNC: 4.7 G/DL (ref 3.2–4.9)
ALBUMIN/GLOB SERPL: 2.4 G/DL
ALP SERPL-CCNC: 37 U/L (ref 30–99)
ALT SERPL-CCNC: 12 U/L (ref 2–50)
ANION GAP SERPL CALC-SCNC: 8 MMOL/L (ref 0–11.9)
AST SERPL-CCNC: 14 U/L (ref 12–45)
BASOPHILS # BLD AUTO: 1 % (ref 0–1.8)
BASOPHILS # BLD: 0.07 K/UL (ref 0–0.12)
BILIRUB SERPL-MCNC: 0.7 MG/DL (ref 0.1–1.5)
BUN SERPL-MCNC: 15 MG/DL (ref 8–22)
CALCIUM SERPL-MCNC: 9.4 MG/DL (ref 8.5–10.5)
CHLORIDE SERPL-SCNC: 104 MMOL/L (ref 96–112)
CO2 SERPL-SCNC: 23 MMOL/L (ref 20–33)
CREAT SERPL-MCNC: 0.82 MG/DL (ref 0.5–1.4)
EOSINOPHIL # BLD AUTO: 0.2 K/UL (ref 0–0.51)
EOSINOPHIL NFR BLD: 2.8 % (ref 0–6.9)
ERYTHROCYTE [DISTWIDTH] IN BLOOD BY AUTOMATED COUNT: 41.4 FL (ref 35.9–50)
EST. AVERAGE GLUCOSE BLD GHB EST-MCNC: 97 MG/DL
GLOBULIN SER CALC-MCNC: 2 G/DL (ref 1.9–3.5)
GLUCOSE SERPL-MCNC: 85 MG/DL (ref 65–99)
HBA1C MFR BLD: 5 % (ref 0–5.6)
HCT VFR BLD AUTO: 44.3 % (ref 37–47)
HGB BLD-MCNC: 14.9 G/DL (ref 12–16)
IMM GRANULOCYTES # BLD AUTO: 0.03 K/UL (ref 0–0.11)
IMM GRANULOCYTES NFR BLD AUTO: 0.4 % (ref 0–0.9)
LYMPHOCYTES # BLD AUTO: 2.06 K/UL (ref 1–4.8)
LYMPHOCYTES NFR BLD: 29.3 % (ref 22–41)
MCH RBC QN AUTO: 30 PG (ref 27–33)
MCHC RBC AUTO-ENTMCNC: 33.6 G/DL (ref 33.6–35)
MCV RBC AUTO: 89.3 FL (ref 81.4–97.8)
MONOCYTES # BLD AUTO: 0.51 K/UL (ref 0–0.85)
MONOCYTES NFR BLD AUTO: 7.3 % (ref 0–13.4)
NEUTROPHILS # BLD AUTO: 4.16 K/UL (ref 2–7.15)
NEUTROPHILS NFR BLD: 59.2 % (ref 44–72)
NRBC # BLD AUTO: 0 K/UL
NRBC BLD-RTO: 0 /100 WBC
PLATELET # BLD AUTO: 257 K/UL (ref 164–446)
PMV BLD AUTO: 10.6 FL (ref 9–12.9)
POTASSIUM SERPL-SCNC: 4 MMOL/L (ref 3.6–5.5)
PROT SERPL-MCNC: 6.7 G/DL (ref 6–8.2)
RBC # BLD AUTO: 4.96 M/UL (ref 4.2–5.4)
SODIUM SERPL-SCNC: 135 MMOL/L (ref 135–145)
WBC # BLD AUTO: 7 K/UL (ref 4.8–10.8)

## 2018-07-12 PROCEDURE — 85025 COMPLETE CBC W/AUTO DIFF WBC: CPT

## 2018-07-12 PROCEDURE — 84443 ASSAY THYROID STIM HORMONE: CPT

## 2018-07-12 PROCEDURE — 80053 COMPREHEN METABOLIC PANEL: CPT

## 2018-07-12 PROCEDURE — 83036 HEMOGLOBIN GLYCOSYLATED A1C: CPT

## 2018-07-12 PROCEDURE — 36415 COLL VENOUS BLD VENIPUNCTURE: CPT

## 2018-07-12 PROCEDURE — 82306 VITAMIN D 25 HYDROXY: CPT

## 2018-07-13 LAB
25(OH)D3 SERPL-MCNC: 26 NG/ML (ref 30–100)
TSH SERPL DL<=0.005 MIU/L-ACNC: 1.75 UIU/ML (ref 0.38–5.33)

## 2018-07-18 ENCOUNTER — OFFICE VISIT (OUTPATIENT)
Dept: MEDICAL GROUP | Facility: LAB | Age: 33
End: 2018-07-18
Payer: COMMERCIAL

## 2018-07-18 VITALS
DIASTOLIC BLOOD PRESSURE: 62 MMHG | HEIGHT: 67 IN | TEMPERATURE: 98 F | OXYGEN SATURATION: 97 % | BODY MASS INDEX: 24.01 KG/M2 | HEART RATE: 88 BPM | RESPIRATION RATE: 12 BRPM | SYSTOLIC BLOOD PRESSURE: 104 MMHG | WEIGHT: 153 LBS

## 2018-07-18 DIAGNOSIS — E55.9 VITAMIN D DEFICIENCY: ICD-10-CM

## 2018-07-18 DIAGNOSIS — Z00.00 HEALTH MAINTENANCE EXAMINATION: ICD-10-CM

## 2018-07-18 DIAGNOSIS — E66.3 OVERWEIGHT (BMI 25.0-29.9): ICD-10-CM

## 2018-07-18 DIAGNOSIS — M62.838 NECK MUSCLE SPASM: ICD-10-CM

## 2018-07-18 DIAGNOSIS — R73.01 IMPAIRED FASTING GLUCOSE: ICD-10-CM

## 2018-07-18 PROBLEM — R53.83 LETHARGY: Status: RESOLVED | Noted: 2017-02-23 | Resolved: 2018-07-18

## 2018-07-18 PROBLEM — M25.561 ACUTE PAIN OF RIGHT KNEE: Status: RESOLVED | Noted: 2017-04-06 | Resolved: 2018-07-18

## 2018-07-18 PROCEDURE — 99395 PREV VISIT EST AGE 18-39: CPT | Performed by: FAMILY MEDICINE

## 2018-07-18 NOTE — PROGRESS NOTES
"Subjective:   Carlotta Campos is a 33 y.o. female here today for   Chief Complaint   Patient presents with   • Annual Exam   • Nevus     check       1. Health maintenance examination  Pap smear was done by Ragini Nicholas.  She is exercising.  She is in a committed relationship and not currently on birth control.  She eats a healthy diet.  Her vaccinations are up-to-date.  She had labs done which are in the computer and reviewed today.    2. Vitamin D deficiency  This is chronic.  Patient is taking 4000 units daily    3. Neck muscle spasm  This is chronic.  Patient takes Soma as needed for this.  Not currently in need of any refills.    4. Impaired fasting glucose  5. Overweight (BMI 25.0-29.9)  Chronic.  Her repeat labs show glucose of 85 and hemoglobin A1c of 5.0%.  She has lost weight.  She is working on diet.  She is currently on contrave 1 tab twice a day    Current medicines (including changes today)  Current Outpatient Prescriptions   Medication Sig Dispense Refill   • Naltrexone-Bupropion HCl ER (CONTRAVE) 8-90 MG TABLET SR 12 HR Take 2 Tabs by mouth 2 times a day. 360 Tab 1   • vitamin D (CHOLECALCIFEROL) 1000 UNIT Tab Take 1,000 Units by mouth every day.     • omeprazole (PRILOSEC) 40 MG delayed-release capsule Take 40 mg by mouth every day.     • multivitamin (THERAGRAN) Tab Take 1 Tab by mouth every day.     • ibuprofen (MOTRIN) 800 MG Tab Take 800 mg by mouth every 8 hours as needed.       No current facility-administered medications for this visit.      She  has a past medical history of Eosinophilic esophagitis (2/23/2017); Panic attack; Psychiatric disorder; Sebaceous cyst of left eyelid (7/17/2017); and Seborrheic keratosis (7/17/2017).    ROS   No fevers  No bowel changes  No LE edema       Objective:     Blood pressure 104/62, pulse 88, temperature 36.7 °C (98 °F), resp. rate 12, height 1.689 m (5' 6.5\"), weight 69.4 kg (153 lb), SpO2 97 %. Body mass index is 24.32 kg/m².   Physical " Exam:  Constitutional: Alert, no distress.  Skin: Warm, dry, good turgor, no rashes in visible areas. 6mm irregular bordered nevus on L back   Eye: Equal, round and reactive, conjunctiva clear, lids normal.  ENMT: Lips without lesions, good dentition, oropharynx clear.  Neck: Trachea midline, no masses, no thyromegaly. No cervical or supraclavicular lymphadenopathy  Respiratory: Unlabored respiratory effort, lungs clear to auscultation, no wheezes, no ronchi.  Cardiovascular: Normal S1, S2, RRR, no murmur, no edema.  Abdomen: Soft, non-tender, no masses, no hepatosplenomegaly.  Psych: Alert and oriented x3, normal affect and mood.      Assessment and Plan:   The following treatment plan was discussed    1. Health maintenance examination  Age-appropriate counseling given, Pap smear up-to-date, vaccinations up-to-date, labs reviewed, diet and exercise discussed    2. Vitamin D deficiency  Chronic, stable on 4000 units daily now that she received her labs back.  She was not taking this consistently prior    3. Neck muscle spasm  Chronic, takes Soma for this.  Not currently in need of a refill    4. Impaired fasting glucose  Chronic, stable after weight loss    5. Overweight (BMI 25.0-29.9)  Chronic, stable, patient has lost weight.  She is currently taking Livalo medication 1 tab twice a day.  I have given her a refill as this is the last month that her insurance will cover her prescription  - Naltrexone-Bupropion HCl ER (CONTRAVE) 8-90 MG TABLET SR 12 HR; Take 2 Tabs by mouth 2 times a day.  Dispense: 360 Tab; Refill: 1        Followup: Return for shave biopsy L back .       This note was created using voice recognition software. I have made every reasonable attempt to correct errors, however, I do anticipate some grammatical errors.

## 2018-09-19 DIAGNOSIS — M62.838 NECK MUSCLE SPASM: ICD-10-CM

## 2018-09-19 RX ORDER — CARISOPRODOL 350 MG/1
TABLET ORAL
Qty: 90 TAB | Refills: 1 | Status: SHIPPED
Start: 2018-09-19 | End: 2018-12-18

## 2018-09-19 NOTE — TELEPHONE ENCOUNTER
Was the patient seen in the last year in this department? Yes  [facility] 8/22/18 #30    Does patient have an active prescription for medications requested? No     Received Request Via: Pharmacy

## 2018-09-19 NOTE — TELEPHONE ENCOUNTER
Prescription faxed to:    [facility]/pharmacy #[id number] - [place] - [address]  [address] [address]  Phone: [phone] Fax: [phone]  .

## 2018-10-16 ENCOUNTER — NON-PROVIDER VISIT (OUTPATIENT)
Dept: MEDICAL GROUP | Facility: LAB | Age: 33
End: 2018-10-16
Payer: COMMERCIAL

## 2018-10-16 ENCOUNTER — TELEPHONE (OUTPATIENT)
Dept: MEDICAL GROUP | Facility: LAB | Age: 33
End: 2018-10-16

## 2018-10-16 DIAGNOSIS — Z23 NEED FOR VACCINATION: ICD-10-CM

## 2018-10-16 DIAGNOSIS — Z23 NEED FOR INFLUENZA VACCINATION: ICD-10-CM

## 2018-10-16 PROCEDURE — 90471 IMMUNIZATION ADMIN: CPT | Performed by: NURSE PRACTITIONER

## 2018-10-16 PROCEDURE — 90686 IIV4 VACC NO PRSV 0.5 ML IM: CPT | Performed by: NURSE PRACTITIONER

## 2018-10-17 NOTE — PROGRESS NOTES
I have placed the below orders and discussed them with Dr. Corey. the MA is performing the below orders under the direction of Dr. SANDHU

## 2018-10-17 NOTE — PROGRESS NOTES
"Carlotta Campos is a 33 y.o. female here for a non-provider visit for:   FLU    Reason for immunization: Annual Flu Vaccine  Immunization records indicate need for vaccine: Yes, confirmed with Epic  Minimum interval has been met for this vaccine: Yes  ABN completed: Not Indicated    Order and dose verified by: ROLANDA  VIS Dated  8/7/15 was given to patient: Yes  All IAC Questionnaire questions were answered \"No.\"    Patient tolerated injection and no adverse effects were observed or reported: Yes    Pt scheduled for next dose in series: Not Indicated  "

## 2018-11-02 ENCOUNTER — OFFICE VISIT (OUTPATIENT)
Dept: MEDICAL GROUP | Facility: LAB | Age: 33
End: 2018-11-02
Payer: COMMERCIAL

## 2018-11-02 VITALS
OXYGEN SATURATION: 99 % | TEMPERATURE: 98.6 F | HEART RATE: 71 BPM | WEIGHT: 155.4 LBS | RESPIRATION RATE: 12 BRPM | DIASTOLIC BLOOD PRESSURE: 68 MMHG | BODY MASS INDEX: 24.98 KG/M2 | SYSTOLIC BLOOD PRESSURE: 112 MMHG | HEIGHT: 66 IN

## 2018-11-02 DIAGNOSIS — M77.8 EXTENSOR TENDONITIS OF FOOT: ICD-10-CM

## 2018-11-02 DIAGNOSIS — E55.9 VITAMIN D DEFICIENCY: ICD-10-CM

## 2018-11-02 PROBLEM — M79.672 LEFT FOOT PAIN: Status: ACTIVE | Noted: 2018-11-02

## 2018-11-02 PROBLEM — M79.671 RIGHT FOOT PAIN: Status: ACTIVE | Noted: 2018-11-02

## 2018-11-02 PROCEDURE — 99204 OFFICE O/P NEW MOD 45 MIN: CPT | Performed by: INTERNAL MEDICINE

## 2018-11-02 RX ORDER — METHYLPREDNISOLONE 4 MG/1
TABLET ORAL
Qty: 21 TAB | Refills: 0 | Status: SHIPPED | OUTPATIENT
Start: 2018-11-02 | End: 2019-03-13

## 2018-11-02 NOTE — PROGRESS NOTES
Chief Complaint   Patient presents with   • Foot Pain     x2 weeks , sharp pain and tingle sensation on left foot       Subjective:     History of Present Illness: Patient is a 33 y.o. female. This pleasant patient of Dr. Sue who is here today for an acute visit.    Extensor tendonitis of foot  New to discuss, uncontrolled problem.  Started about 2 and half weeks ago when she was exercising and doing Pilates, she felt a pop on her left foot she stopped those exercises.  About a week later developed a very sharp pain in her left foot at this extend from her anterior lower leg area to the anterior lateral side of her left foot.  She described the pain as very quick sharp and sometimes associated with tingling and numbness.  Rated as 6/10 at its worst.  It does not worsen with walking or bearing weight on her foot but it does worsen with extension.  She did not his initial swelling to the area but this has spontaneously resolved within 2 days.  She did not have any associated redness or warmth to the area.  She did try taking ibuprofen 400 mg initially with only mild relief of her symptoms.          Vitamin D deficiency  New to me, chronic controlled.  Her most recent vitamin D level in July was 26, she started taking over-the-counter vitamin D was 2000 IUs daily.  She would like to recheck her vitamin D.      Allergies: Vicodin [hydrocodone-acetaminophen]    Current Outpatient Prescriptions Ordered in [facility]   Medication Sig Dispense Refill   • MethylPREDNISolone (MEDROL DOSEPAK) 4 MG Tablet Therapy Pack As directed on the packaging label. 21 Tab 0   • multivitamin (THERAGRAN) Tab Take 1 Tab by mouth every day.     • vitamin D (CHOLECALCIFEROL) 1000 UNIT Tab Take 1,000 Units by mouth every day.     • carisoprodol (SOMA) 350 MG Tab TAKE 1 TABLET BY MOUTH EVERY DAY AS NEEDED FOR MUSCLE SPASMS 90 Tab 1   • Naltrexone-Bupropion HCl ER (CONTRAVE) 8-90 MG TABLET SR 12 HR Take 2 Tabs by mouth 2 times a day. 360 Tab 1   •  omeprazole (PRILOSEC) 40 MG delayed-release capsule Take 40 mg by mouth every day.     • ibuprofen (MOTRIN) 800 MG Tab Take 800 mg by mouth every 8 hours as needed.       No current Epic-ordered facility-administered medications on file.        Past Medical History:   Diagnosis Date   • Eosinophilic esophagitis 2/23/2017   • Panic attack    • Psychiatric disorder     anxiety   • Sebaceous cyst of left eyelid 7/17/2017   • Seborrheic keratosis 7/17/2017       Past Surgical History:   Procedure Laterality Date   • APPENDECTOMY     • CHOLECYSTECTOMY         Social History   Substance Use Topics   • Smoking status: Never Smoker   • Smokeless tobacco: Never Used   • Alcohol use 1.8 oz/week     3 Glasses of wine per week       Family History   Problem Relation Age of Onset   • Thyroid Mother    • Hypertension Mother    • Hyperlipidemia Mother    • Cancer Father         basal    • Thyroid Paternal Aunt    • Thyroid Maternal Grandmother    • Heart Disease Paternal Grandfather         MI        ROS:     - Constitutional: Negative for fever, chills, unexpected weight change, and fatigue/generalized weakness.     - HEENT: Negative for headaches, vision changes, hearing changes, ear pain, ear discharge, sinus congestion, sore throat, and neck pain.      - Respiratory: Negative for cough, sputum production, dyspnea and wheezing.    - Cardiovascular: Negative for chest pain, palpitations, orthopnea, and bilateral lower extremity edema.     - Gastrointestinal: Negative for heartburn, nausea, vomiting, abdominal pain, hematochezia, melena, diarrhea, constipation, and greasy/foul-smelling stools.     - Genitourinary: Negative for dysuria, polyuria, hematuria, pyuria, urinary urgency, and urinary incontinence.    - Musculoskeletal: Per HPI.    - Skin: Negative for rash, itching, cyanotic skin color change.     - Neurological: Negative for dizziness, tingling, tremors, focal sensory deficit, focal weakness and headaches.     -  "Endo/Heme/Allergies: Does not bruise/bleed easily.     - Psychiatric/Behavioral: Negative for depression, suicidal/homicidal ideation and memory loss.        - NOTE: All other systems reviewed and are negative, except as in HPI.       Physical Exam:     Blood pressure 112/68, pulse 71, temperature 37 °C (98.6 °F), temperature source Temporal, resp. rate 12, height 1.676 m (5' 6\"), weight 70.5 kg (155 lb 6.4 oz), SpO2 99 %. Body mass index is 25.08 kg/m².   General: Normal appearing. No distress.  HEENT: Normocephalic. Eyes conjunctiva clear lids without ptosis, pupils equal and reactive to light accommodation, ears normal shape and contour, canals are clear bilaterally, tympanic membranes are benign,  oropharynx is without erythema, edema or exudates.    Neck: Supple without JVD or bruit. Thyroid is not enlarged.  Pulmonary: Clear to ausculation.  Normal effort. No rales, ronchi, or wheezing.  Cardiovascular: Regular rate and rhythm without murmur. Radial pulses are intact, regular and symmetrical bilaterally.  Abdomen: Soft, nontender, nondistended. Normal bowel sounds. Liver and spleen are not palpable.  Neurologic: Grossly non-focal.  Lymph: No cervical, occipital or supraclavicular lymph nodes are palpable  Skin: Warm and dry.  No obvious lesions.  Musculoskeletal: Normal gait. No extremity cyanosis, clubbing, or edema.  Bilateral feet are symmetrical without any swelling, normal range of motion.  Point tenderness to palpation over the tendon extending from anterior left leg to anterior lateral area of lateral left foot corresponding to fourth and third metatarsals.  Psych: Normal mood and affect. Alert and oriented x3. Judgment and insight is normal.    Data:     LABS: 7/2018: Results reviewed and discussed with the patient, questions answered.      Assessment and Plan:     1. Extensor tendonitis of foot  New, uncontrolled problem.  Commended a Medrol pack take on a full stomach to avoid gastritis take earlier " in the day to avoid insomnia.  Discussed potential elevated blood sugar and blood pressure side effects.  Also recommended resting her foot, warm compressors and not resuming exercise until it fully heals.    - MethylPREDNISolone (MEDROL DOSEPAK) 4 MG Tablet Therapy Pack; As directed on the packaging label.  Dispense: 21 Tab; Refill: 0    2. Vitamin D deficiency  New to me, uncontrolled problem.  Continue on over-the-counter 2000 IUs daily.  Will recheck her vitamin D level.    - VITAMIN D,25 HYDROXY; Future        Follow Up:      Return for PRN with PCP.    Please note that this dictation was created using voice recognition software. I have made every reasonable attempt to correct obvious errors, but I expect that there are errors of grammar and possibly content that I did not discover before finalizing the note.    Signed by: Rosario De La O M.D.

## 2018-11-02 NOTE — ASSESSMENT & PLAN NOTE
New to me, chronic controlled.  Her most recent vitamin D level in July was 26, she started taking over-the-counter vitamin D was 2000 IUs daily.  She would like to recheck her vitamin D.

## 2019-03-13 ENCOUNTER — HOSPITAL ENCOUNTER (OUTPATIENT)
Dept: LAB | Facility: MEDICAL CENTER | Age: 34
End: 2019-03-13
Attending: FAMILY MEDICINE
Payer: COMMERCIAL

## 2019-03-13 ENCOUNTER — OFFICE VISIT (OUTPATIENT)
Dept: MEDICAL GROUP | Facility: LAB | Age: 34
End: 2019-03-13
Payer: COMMERCIAL

## 2019-03-13 VITALS
BODY MASS INDEX: 25.01 KG/M2 | RESPIRATION RATE: 14 BRPM | TEMPERATURE: 98.3 F | WEIGHT: 155.6 LBS | SYSTOLIC BLOOD PRESSURE: 112 MMHG | OXYGEN SATURATION: 98 % | DIASTOLIC BLOOD PRESSURE: 58 MMHG | HEIGHT: 66 IN | HEART RATE: 90 BPM

## 2019-03-13 DIAGNOSIS — E66.3 OVERWEIGHT (BMI 25.0-29.9): ICD-10-CM

## 2019-03-13 DIAGNOSIS — M62.838 NECK MUSCLE SPASM: ICD-10-CM

## 2019-03-13 DIAGNOSIS — K20.0 EOSINOPHILIC ESOPHAGITIS: ICD-10-CM

## 2019-03-13 DIAGNOSIS — E55.9 VITAMIN D DEFICIENCY: ICD-10-CM

## 2019-03-13 LAB
25(OH)D3 SERPL-MCNC: 55 NG/ML (ref 30–100)
ALBUMIN SERPL BCP-MCNC: 4.8 G/DL (ref 3.2–4.9)
ALBUMIN/GLOB SERPL: 2.3 G/DL
ALP SERPL-CCNC: 32 U/L (ref 30–99)
ALT SERPL-CCNC: 15 U/L (ref 2–50)
ANION GAP SERPL CALC-SCNC: 7 MMOL/L (ref 0–11.9)
AST SERPL-CCNC: 18 U/L (ref 12–45)
BILIRUB SERPL-MCNC: 0.4 MG/DL (ref 0.1–1.5)
BUN SERPL-MCNC: 14 MG/DL (ref 8–22)
CALCIUM SERPL-MCNC: 9.7 MG/DL (ref 8.5–10.5)
CHLORIDE SERPL-SCNC: 105 MMOL/L (ref 96–112)
CO2 SERPL-SCNC: 27 MMOL/L (ref 20–33)
CREAT SERPL-MCNC: 0.84 MG/DL (ref 0.5–1.4)
GLOBULIN SER CALC-MCNC: 2.1 G/DL (ref 1.9–3.5)
GLUCOSE SERPL-MCNC: 79 MG/DL (ref 65–99)
MAGNESIUM SERPL-MCNC: 1.8 MG/DL (ref 1.5–2.5)
POTASSIUM SERPL-SCNC: 4.1 MMOL/L (ref 3.6–5.5)
PROT SERPL-MCNC: 6.9 G/DL (ref 6–8.2)
SODIUM SERPL-SCNC: 139 MMOL/L (ref 135–145)

## 2019-03-13 PROCEDURE — 83735 ASSAY OF MAGNESIUM: CPT

## 2019-03-13 PROCEDURE — 36415 COLL VENOUS BLD VENIPUNCTURE: CPT

## 2019-03-13 PROCEDURE — 99214 OFFICE O/P EST MOD 30 MIN: CPT | Performed by: FAMILY MEDICINE

## 2019-03-13 PROCEDURE — 80053 COMPREHEN METABOLIC PANEL: CPT

## 2019-03-13 PROCEDURE — 82306 VITAMIN D 25 HYDROXY: CPT

## 2019-03-13 RX ORDER — ECHINACEA 500 MG
CAPSULE ORAL
COMMUNITY
End: 2020-08-12

## 2019-03-13 RX ORDER — OMEPRAZOLE 20 MG/1
40 CAPSULE, DELAYED RELEASE ORAL DAILY
Qty: 180 CAP | Refills: 3 | Status: SHIPPED | OUTPATIENT
Start: 2019-03-13 | End: 2020-06-08

## 2019-03-13 RX ORDER — OMEPRAZOLE 40 MG/1
40 CAPSULE, DELAYED RELEASE ORAL DAILY
Qty: 90 CAP | Refills: 3 | Status: SHIPPED | OUTPATIENT
Start: 2019-03-13 | End: 2019-05-13

## 2019-03-13 RX ORDER — CARISOPRODOL 350 MG/1
350 TABLET ORAL 4 TIMES DAILY
COMMUNITY
End: 2019-03-13 | Stop reason: SDUPTHER

## 2019-03-13 RX ORDER — CARISOPRODOL 350 MG/1
350 TABLET ORAL 4 TIMES DAILY
Qty: 120 TAB | Refills: 4 | Status: SHIPPED
Start: 2019-03-13 | End: 2019-04-12

## 2019-03-13 ASSESSMENT — PATIENT HEALTH QUESTIONNAIRE - PHQ9: CLINICAL INTERPRETATION OF PHQ2 SCORE: 0

## 2019-03-14 NOTE — PROGRESS NOTES
Subjective:   Carlotta Campos is a 34 y.o. female here today for   Chief Complaint   Patient presents with   • Other     magnessium and potassium blood work.        1. Neck muscle spasm  This is chronic.  Patient is on Soma which she has been on for many years.  She takes is up to 4 times daily which helps significantly with her muscle spasm.  She has done physical therapy, daily stretching, yoga.    2. Vitamin D deficiency  Chronic.  She is taking vitamin D supplements 1000 units plus a prenatal multivitamin    3. Eosinophilic esophagitis  Chronic.  She does follow with GI.  She has been stable on omeprazole 40 mg daily but she often alternates this with 20 mg daily to try to limit her dose.  She has not needed any steroids for this.  Right now her allergies are under control.    4. Overweight (BMI 25.0-29.9)  Chronic.  She is on Contrave which helps stabilize her weight.  She is not having any side effects from this medication.  We discussed weaning off of this prior to any attempts for pregnancy.      Current medicines (including changes today)  Current Outpatient Prescriptions   Medication Sig Dispense Refill   • Echinacea 500 MG Cap Take  by mouth.     • carisoprodol (SOMA) 350 MG Tab Take 1 Tab by mouth 4 times a day for 30 days. 120 Tab 4   • Naltrexone-Bupropion HCl ER (CONTRAVE) 8-90 MG TABLET SR 12 HR Take 2 Tabs by mouth 2 times a day. 360 Tab 1   • omeprazole (PRILOSEC) 20 MG delayed-release capsule Take 2 Caps by mouth every day. 180 Cap 3   • multivitamin (THERAGRAN) Tab Take 1 Tab by mouth every day.     • vitamin D (CHOLECALCIFEROL) 1000 UNIT Tab Take 1,000 Units by mouth every day.     • ibuprofen (MOTRIN) 800 MG Tab Take 800 mg by mouth every 8 hours as needed.       No current facility-administered medications for this visit.      She  has a past medical history of Eosinophilic esophagitis (2/23/2017); Panic attack; Psychiatric disorder; Sebaceous cyst of left eyelid (7/17/2017); and Seborrheic  "keratosis (7/17/2017).    ROS   No fevers  No bowel changes  No LE edema       Objective:     Blood pressure 112/58, pulse 90, temperature 36.8 °C (98.3 °F), temperature source Temporal, resp. rate 14, height 1.676 m (5' 6\"), weight 70.6 kg (155 lb 9.6 oz), last menstrual period 03/01/2019, SpO2 98 %. Body mass index is 25.11 kg/m².   Physical Exam:  Constitutional: Alert, no distress.  Skin: Warm, dry, good turgor, no rashes in visible areas.  Eye: Equal, round and reactive, conjunctiva clear, lids normal.  ENMT: Lips without lesions, good dentition, oropharynx clear.  Neck: Trachea midline, no masses, no thyromegaly. No cervical or supraclavicular lymphadenopathy  Respiratory: Unlabored respiratory effort, lungs clear to auscultation, no wheezes, no ronchi.  Cardiovascular: Normal S1, S2, RRR, no murmur, no edema.  Abdomen: Soft, non-tender, no masses, no hepatosplenomegaly.  Psych: Alert and oriented x3, normal affect and mood.        Assessment and Plan:   The following treatment plan was discussed    1. Neck muscle spasm  Chronic, stable on Soma 350 mg up to 4 times daily.  A refill has been given.  Discussed risks of this medication, no driving while on the medication.  - carisoprodol (SOMA) 350 MG Tab; Take 1 Tab by mouth 4 times a day for 30 days.  Dispense: 120 Tab; Refill: 4    2. Vitamin D deficiency  Chronic, stable, recheck labs  - Comp Metabolic Panel; Future  - VITAMIN D,25 HYDROXY; Future  - MAGNESIUM    3. Eosinophilic esophagitis  Chronic, stable, refill omeprazole to 40 mg daily  - Comp Metabolic Panel; Future  - VITAMIN D,25 HYDROXY; Future  - MAGNESIUM    4. Overweight (BMI 25.0-29.9)  Chronic, currently stable on appetite suppressant, naltrexone/bupropion.  Doing well on this medication.  Discussed regular exercise and weaning off this medication before any attempts at pregnancy.  - Naltrexone-Bupropion HCl ER (CONTRAVE) 8-90 MG TABLET SR 12 HR; Take 2 Tabs by mouth 2 times a day.  Dispense: " 360 Tab; Refill: 1      Followup: Return in about 6 months (around 9/13/2019) for Annual.       This note was created using voice recognition software. I have made every reasonable attempt to correct errors, however, I do anticipate some grammatical errors.

## 2019-03-21 ENCOUNTER — HOSPITAL ENCOUNTER (OUTPATIENT)
Facility: MEDICAL CENTER | Age: 34
End: 2019-03-21
Attending: FAMILY MEDICINE
Payer: COMMERCIAL

## 2019-03-21 ENCOUNTER — OFFICE VISIT (OUTPATIENT)
Dept: MEDICAL GROUP | Facility: LAB | Age: 34
End: 2019-03-21
Payer: COMMERCIAL

## 2019-03-21 VITALS
HEIGHT: 66 IN | HEART RATE: 70 BPM | OXYGEN SATURATION: 98 % | SYSTOLIC BLOOD PRESSURE: 110 MMHG | DIASTOLIC BLOOD PRESSURE: 62 MMHG | WEIGHT: 155.4 LBS | TEMPERATURE: 97.5 F | BODY MASS INDEX: 24.98 KG/M2 | RESPIRATION RATE: 16 BRPM

## 2019-03-21 DIAGNOSIS — R10.2 PELVIC PAIN: ICD-10-CM

## 2019-03-21 DIAGNOSIS — N89.8 VAGINAL DISCHARGE: ICD-10-CM

## 2019-03-21 LAB
AMBIGUOUS DTTM AMBI4: NORMAL
APPEARANCE UR: CLEAR
BILIRUB UR STRIP-MCNC: NEGATIVE MG/DL
COLOR UR AUTO: YELLOW
GLUCOSE UR STRIP.AUTO-MCNC: NEGATIVE MG/DL
KETONES UR STRIP.AUTO-MCNC: NEGATIVE MG/DL
LEUKOCYTE ESTERASE UR QL STRIP.AUTO: NEGATIVE
NITRITE UR QL STRIP.AUTO: NEGATIVE
PH UR STRIP.AUTO: 7.5 [PH] (ref 5–8)
PROT UR QL STRIP: NEGATIVE MG/DL
RBC UR QL AUTO: NEGATIVE
SP GR UR STRIP.AUTO: 1.01
UROBILINOGEN UR STRIP-MCNC: 0.2 MG/DL

## 2019-03-21 PROCEDURE — 87086 URINE CULTURE/COLONY COUNT: CPT

## 2019-03-21 PROCEDURE — 87660 TRICHOMONAS VAGIN DIR PROBE: CPT

## 2019-03-21 PROCEDURE — 87480 CANDIDA DNA DIR PROBE: CPT

## 2019-03-21 PROCEDURE — 87510 GARDNER VAG DNA DIR PROBE: CPT

## 2019-03-21 PROCEDURE — 99215 OFFICE O/P EST HI 40 MIN: CPT | Performed by: FAMILY MEDICINE

## 2019-03-21 PROCEDURE — 81002 URINALYSIS NONAUTO W/O SCOPE: CPT | Performed by: FAMILY MEDICINE

## 2019-03-21 NOTE — PROGRESS NOTES
Subjective:   Carlotta Campos is a 34 y.o. female here today for pelvic pain    1. Pelvic pain  2. Vaginal discharge  This is a new problem to discuss with me today.  Patient reports 1 month of symptoms although this has been recurrent over the last 3 years.  She reports over the last month, very slight vaginal discharge and smell.  No fishy odor, just different than her normal odor.  Discharge is clear or white but increased in amount.  She is started to get pelvic pressure and pain.  [facility]  She first saw them on 2/11/2019.  She brings in all of the records today and I have reviewed and summarized these.  On 211, a wet mount was done and potential yeast was found.  She was started on terconazole topical 0.8% cream for 4 days.  No improvement so she repeated this course for a total of 8 days.  She had no improvement and thus saw them again on 2/26/2019.  At that time she continued to have some slight vaginal irritation, continued discharge, and continued odor.  A vaginal culture was collected at that time at that time she was found to have white blood cell count on wet prep she was treated with doxycycline for 10 days with no improvement and then worsening of her symptoms.  She went back to see them on 3/18/2019 with now some pelvic pain and the same symptoms that she had been given.  She continued to have white blood cells in the discharge.  She was given metronidazole and Diflucan and she has not yet started these yet.  She was tested for other STDs during this visit.  Her fiancé has recurrent symptoms of increased urinary frequency, no itching or burning.  He has been tested for multiple STDs in the past.  He is in nursing school.  He is worried about mycoplasma genitalia as this has not been tested.  They are also worried about pelvic inflammatory disease as they would like to get pregnant soon.    Current medicines (including changes today)  Current  "Outpatient Prescriptions   Medication Sig Dispense Refill   • Echinacea 500 MG Cap Take  by mouth.     • carisoprodol (SOMA) 350 MG Tab Take 1 Tab by mouth 4 times a day for 30 days. 120 Tab 4   • Naltrexone-Bupropion HCl ER (CONTRAVE) 8-90 MG TABLET SR 12 HR Take 2 Tabs by mouth 2 times a day. 360 Tab 1   • omeprazole (PRILOSEC) 20 MG delayed-release capsule Take 2 Caps by mouth every day. 180 Cap 3   • multivitamin (THERAGRAN) Tab Take 1 Tab by mouth every day.     • vitamin D (CHOLECALCIFEROL) 1000 UNIT Tab Take 1,000 Units by mouth every day.     • ibuprofen (MOTRIN) 800 MG Tab Take 800 mg by mouth every 8 hours as needed.       No current facility-administered medications for this visit.      She  has a past medical history of Eosinophilic esophagitis (2/23/2017); Panic attack; Psychiatric disorder; Sebaceous cyst of left eyelid (7/17/2017); and Seborrheic keratosis (7/17/2017).    ROS   No fevers  No bowel changes  No LE edema       Objective:     Blood pressure 110/62, pulse 70, temperature 36.4 °C (97.5 °F), temperature source Temporal, resp. rate 16, height 1.676 m (5' 6\"), weight 70.5 kg (155 lb 6.4 oz), last menstrual period 03/01/2019, SpO2 98 %. Body mass index is 25.08 kg/m².   Physical Exam:  General: No acute distress, WN/WD  HEENT: NC/AT, lids normal without lesions, good dentition  Neck: Trachea midline  Cardiovascular: Regular Rate  Pulmonary: No respiratory distress  Skin: No rashes noted  Neurologic: CN II-XII grossly intact, normal gait  Psych: Alert and oriented x 3, normal insight and judgement  Abd: Soft mild suprapubic tenderness to palpation, no CVA tenderness    Assessment and Plan:   The following treatment plan was discussed    1. Pelvic pain  2. Vaginal discharge  This is a new, uncontrolled problem.  I have reviewed and summarized all of her OB/GYN records as above.  She does have a prescription for metronidazole and Flagyl which I have encouraged the patient to start as this is " likely bacterial vaginosis.  We discussed other differential diagnoses today including pelvic inflammatory disease, mycoplasma genitalia.  She has a full STD panel pending with her OB/GYN.  I would like to get a pelvic ultrasound especially since she has an IUD in place to evaluate for endometritis and urine today.  I have also collected an affirm vaginal pathogens culture.  Urinalysis today is negative.  - US-PELVIC COMPLETE (TRANSABDOMINAL/TRANSVAGINAL) (COMBO); Future  - POCT Urinalysis  - URINE CULTURE(NEW); Future  - VAGINAL PATHOGENS DNA PANEL; Future    Followup: Return if symptoms worsen or fail to improve.       This note was created using voice recognition software. I have made every reasonable attempt to correct errors, however, I do anticipate some grammatical errors.     Total 40 minutes face-to-face time spent with patient not including any procedure, with greater than 50% of the total time discussing patient's issues and symptoms as listed above in assessment and plan, as well as managing coordination of care for future evaluation and treatment.

## 2019-03-22 DIAGNOSIS — N89.8 VAGINAL DISCHARGE: ICD-10-CM

## 2019-03-22 DIAGNOSIS — R10.2 PELVIC PAIN: ICD-10-CM

## 2019-03-22 LAB
CANDIDA DNA VAG QL PROBE+SIG AMP: NEGATIVE
G VAGINALIS DNA VAG QL PROBE+SIG AMP: NEGATIVE
T VAGINALIS DNA VAG QL PROBE+SIG AMP: NEGATIVE

## 2019-03-24 LAB
BACTERIA UR CULT: NORMAL
SIGNIFICANT IND 70042: NORMAL
SITE SITE: NORMAL
SOURCE SOURCE: NORMAL

## 2019-03-26 ENCOUNTER — HOSPITAL ENCOUNTER (OUTPATIENT)
Dept: RADIOLOGY | Facility: MEDICAL CENTER | Age: 34
End: 2019-03-26
Attending: FAMILY MEDICINE
Payer: COMMERCIAL

## 2019-03-26 DIAGNOSIS — R10.2 PELVIC PAIN: ICD-10-CM

## 2019-03-26 PROCEDURE — 76830 TRANSVAGINAL US NON-OB: CPT

## 2019-03-28 ENCOUNTER — OFFICE VISIT (OUTPATIENT)
Dept: MEDICAL GROUP | Facility: LAB | Age: 34
End: 2019-03-28
Payer: COMMERCIAL

## 2019-03-28 VITALS
TEMPERATURE: 98.4 F | HEIGHT: 66 IN | WEIGHT: 155.6 LBS | SYSTOLIC BLOOD PRESSURE: 122 MMHG | BODY MASS INDEX: 25.01 KG/M2 | DIASTOLIC BLOOD PRESSURE: 82 MMHG | RESPIRATION RATE: 18 BRPM | OXYGEN SATURATION: 98 % | HEART RATE: 87 BPM

## 2019-03-28 DIAGNOSIS — N83.201 RIGHT OVARIAN CYST: ICD-10-CM

## 2019-03-28 DIAGNOSIS — N89.8 VAGINAL DISCHARGE: ICD-10-CM

## 2019-03-28 PROCEDURE — 99214 OFFICE O/P EST MOD 30 MIN: CPT | Performed by: FAMILY MEDICINE

## 2019-03-30 NOTE — PROGRESS NOTES
Subjective:   Carlotta Campos is a 34 y.o. female here today for   Chief Complaint   Patient presents with   • Follow-Up     U/S results   • Ovarian Cyst     patient reported from the report        1. Right ovarian cyst  New  Found on US   Has RLQ consistent pain x 2-4 weeks  Has vaginal d/c    TVUS:   The endometrial stripe measures 7.6 mm in thickness. An IUD is in place.  1.5 x 1.9 cm complex cystic lesion within the right ovary. Interval follow-up recommended.    2. Vaginal discharge  Chronic  Has had 5 weeks of symptoms  Seeing Camelia Nicholas  Has had leukocytes in vaginal discharge   Other cultures all neg, including in our office  UA neg  No improvement with diflucan and metronidazole  Has repeat appointment tomorrow with gyn and 2nd opinion next week  Considering removing IUD      Current medicines (including changes today)  Current Outpatient Prescriptions   Medication Sig Dispense Refill   • Echinacea 500 MG Cap Take  by mouth.     • carisoprodol (SOMA) 350 MG Tab Take 1 Tab by mouth 4 times a day for 30 days. 120 Tab 4   • Naltrexone-Bupropion HCl ER (CONTRAVE) 8-90 MG TABLET SR 12 HR Take 2 Tabs by mouth 2 times a day. 360 Tab 1   • omeprazole (PRILOSEC) 20 MG delayed-release capsule Take 2 Caps by mouth every day. 180 Cap 3   • multivitamin (THERAGRAN) Tab Take 1 Tab by mouth every day.     • vitamin D (CHOLECALCIFEROL) 1000 UNIT Tab Take 1,000 Units by mouth every day.     • ibuprofen (MOTRIN) 800 MG Tab Take 800 mg by mouth every 8 hours as needed.       No current facility-administered medications for this visit.      She  has a past medical history of Eosinophilic esophagitis (2/23/2017); Panic attack; Psychiatric disorder; Sebaceous cyst of left eyelid (7/17/2017); and Seborrheic keratosis (7/17/2017).    ROS   No fevers  No bowel changes  No LE edema       Objective:     Blood pressure 122/82, pulse 87, temperature 36.9 °C (98.4 °F), temperature source Temporal, resp. rate 18, height 1.676 m (5'  "6\"), weight 70.6 kg (155 lb 9.6 oz), last menstrual period 03/01/2019, SpO2 98 %. Body mass index is 25.11 kg/m².   Physical Exam:  General: No acute distress, WN/WD  HEENT: NC/AT, lids normal without lesions, good dentition  Neck: Trachea midline  Cardiovascular: Regular Rate  Pulmonary: No respiratory distress  Skin: No rashes noted  Neurologic: CN II-XII grossly intact, normal gait  Psych: Alert and oriented x 3, normal insight and judgement  Abd: Mild TTP RLQ, no CVAT       Assessment and Plan:   The following treatment plan was discussed    1. Right ovarian cyst  New, uncontrolled   Repeat US in 6w  Discussed likely menstrual related  Will also f/u with gyn  - US-PELVIC COMPLETE (TRANSABDOMINAL/TRANSVAGINAL) (COMBO); Future    2. Vaginal discharge  Chronic, uncontrolled  Failed multiple rounds of abx and antifungals  She will f/u with gyn   Potentially may need to remove IUD if leukocytes remain in vaginal d/c  She will consult with Dr. Bautista and Dr. Mccray for 2nd opinions.  Records for US and notes sent to offices.     Followup: Return if symptoms worsen or fail to improve.       This note was created using voice recognition software. I have made every reasonable attempt to correct errors, however, I do anticipate some grammatical errors.     Total 25 minutes face-to-face time spent with patient not including any procedure, with greater than 50% of the total time discussing patient's issues and symptoms as listed above in assessment and plan, as well as managing coordination of care for future evaluation and treatment.    "

## 2019-05-07 ENCOUNTER — HOSPITAL ENCOUNTER (OUTPATIENT)
Dept: RADIOLOGY | Facility: MEDICAL CENTER | Age: 34
End: 2019-05-07
Attending: FAMILY MEDICINE
Payer: COMMERCIAL

## 2019-05-07 DIAGNOSIS — N83.201 RIGHT OVARIAN CYST: ICD-10-CM

## 2019-05-07 PROCEDURE — 76830 TRANSVAGINAL US NON-OB: CPT

## 2019-05-13 ENCOUNTER — OFFICE VISIT (OUTPATIENT)
Dept: MEDICAL GROUP | Facility: LAB | Age: 34
End: 2019-05-13
Payer: COMMERCIAL

## 2019-05-13 VITALS
BODY MASS INDEX: 24.59 KG/M2 | OXYGEN SATURATION: 100 % | WEIGHT: 153 LBS | DIASTOLIC BLOOD PRESSURE: 70 MMHG | RESPIRATION RATE: 12 BRPM | HEART RATE: 74 BPM | HEIGHT: 66 IN | SYSTOLIC BLOOD PRESSURE: 120 MMHG | TEMPERATURE: 98.3 F

## 2019-05-13 DIAGNOSIS — N89.8 VAGINAL DISCHARGE: ICD-10-CM

## 2019-05-13 DIAGNOSIS — N89.8 VAGINAL ODOR: ICD-10-CM

## 2019-05-13 DIAGNOSIS — N83.201 RIGHT OVARIAN CYST: ICD-10-CM

## 2019-05-13 PROCEDURE — 99214 OFFICE O/P EST MOD 30 MIN: CPT | Performed by: NURSE PRACTITIONER

## 2019-05-13 NOTE — PROGRESS NOTES
"Chief Complaint   Patient presents with   • Ovarian Cyst     follow up US       VELVET Laguerre is a 34-year-old established female here to follow-up on recent ultrasound as well as vaginal odor and discharge that has been going on for several months.  These are all new issues to me today.    US 3/26/2019 :   The endometrial stripe measures 7.6 mm in thickness. An IUD is in place.  1.5 x 1.9 cm complex cystic lesion within the right ovary. Interval follow-up recommended.    2019:   1.  2.1 x 1.8 x 2.3 cm right ovarian simple appearing cyst. This previously measured 1.9 x 1.5 x 1.8 cm and appeared somewhat more complex. Repeat interval follow-up ultrasound is recommended in 6 weeks to ensure resolution.  2.  An intrauterine device is present.    Still having abnormal vaginal odor - \"sour,\" slight d/c that is clear / light white.  D/c is less compared to a month or two.   - + yeast 2019 - terconazole, flagyl, fluconazole all in the past 3 months - Camelia Nicholas.  Second opinion Dr. Bautista's NP - told all is well.    IUD in place 3-4 years.      Past medical, surgical, family, and social history is reviewed and updated in Epic chart by me today.   Medications and allergies reviewed and updated in Epic chart by me today.     ROS:   No dysuria.  Slight constipation.  No diarrhea.  No flu like symptoms.      Exam:  /70 (BP Location: Left arm, Patient Position: Sitting, BP Cuff Size: Adult)   Pulse 74   Temp 36.8 °C (98.3 °F)   Resp 12   Ht 1.676 m (5' 6\")   Wt 69.4 kg (153 lb)   SpO2 100%   Constitutional: Alert, no distress, plus 3 vital signs  Skin:  Warm, dry, no rashes invisible areas  Eye: Equal, round and reactive, conjunctiva clear  ENMT: Lips without lesions.  Respiratory: Unlabored respiration, lungs clear to auscultation, no wheezes, no rhonchi  Cardiovascular: Normal rate and rhythm.  Psych: Alert, pleasant, well-groomed, normal affect    Assessment / Plan / Medical Decision makin. Right " ovarian cyst  -Recommend 1 more transvaginal ultrasound in 6 weeks the patient was agreeable to.  Reviewed both ultrasounds with her.  - US-PELVIC COMPLETE (TRANSABDOMINAL/TRANSVAGINAL) (COMBO); Future    2. Vaginal discharge  -This is improving.  She has failed multiple antifungal medications as well as metronidazole for bacterial vaginosis.  She would like to refrain from any further prescriptions at this time and follow-up in 1 to 2 months if not improving.    3. Vaginal odor  -As above.

## 2019-05-13 NOTE — LETTER
[facility]  Kallie Corey M.D.  [address]  [place] [address]  Fax: [phone]   Authorization for Release/Disclosure of   Protected Health Information   Name: CARLOTTA CAMPOS : 1985 SSN: [id number]   Address: [address] [place] [address] [place] [address] Phone:    [phone] (home)    I authorize the entity listed below to release/disclose the PHI below to:   [facility]/Kallie Corey M.D. and CHUYITA Horta   Provider or Entity Name:  [facility]   Address   City, State, [facility]   Phone:      Fax:  [phone]   Reason for request: continuity of care   Information to be released:    [  ] LAST COLONOSCOPY,  including any PATH REPORT and follow-up  [  ] LAST FIT/COLOGUARD RESULT [  ] LAST DEXA  [  ] LAST MAMMOGRAM  [  ] LAST PAP  [  ] LAST LABS [  ] RETINA EXAM REPORT  [  ] IMMUNIZATION RECORDS  [  ] Release all info      [  ] Check here and initial the line next to each item to release ALL health information INCLUDING  _____ Care and treatment for drug and / or alcohol abuse  _____ HIV testing, infection status, or AIDS  _____ Genetic Testing    DATES OF SERVICE OR TIME PERIOD TO BE DISCLOSED: _____________  I understand and acknowledge that:  * This Authorization may be revoked at any time by you in writing, except if your health information has already been used or disclosed.  * Your health information that will be used or disclosed as a result of you signing this authorization could be re-disclosed by the recipient. If this occurs, your re-disclosed health information may no longer be protected by State or Federal laws.  * You may refuse to sign this Authorization. Your refusal will not affect your ability to obtain treatment.  * This Authorization becomes effective upon signing and will  on (date) __________.      If no date is indicated, this Authorization will  one (1) year from the signature date.    Name: Carlotta Campos    Signature:   Date:     5/13/2019       PLEASE FAX REQUESTED RECORDS BACK TO: [phone]

## 2019-05-15 ENCOUNTER — TELEPHONE (OUTPATIENT)
Dept: MEDICAL GROUP | Facility: LAB | Age: 34
End: 2019-05-15

## 2019-05-15 NOTE — TELEPHONE ENCOUNTER
1. Caller Name: Janny ([facility])               Call Back Number: [phone]      Patient approves a detailed voicemail message: N\A    Got a VM asking about the release of health of health records, she wasn't sure what it was for.

## 2019-05-15 NOTE — TELEPHONE ENCOUNTER
1. Caller Name: Carlotta VILLANUEVA Beth       Call Back Number: [phone] (home)         Patient approves a detailed voicemail message: N\A    Called back Janny informing her of requesting, carlotta last pap smear.

## 2019-06-04 RX ORDER — ONDANSETRON 8 MG/1
8 TABLET, ORALLY DISINTEGRATING ORAL EVERY 8 HOURS PRN
Qty: 30 TAB | Refills: 0 | Status: SHIPPED | OUTPATIENT
Start: 2019-06-04 | End: 2019-10-29 | Stop reason: SDUPTHER

## 2019-06-04 NOTE — TELEPHONE ENCOUNTER
Was the patient seen in the last year in this department? Yes  5/13/19  Does patient have an active prescription for medications requested? No     Received Request Via: Patient

## 2019-06-04 NOTE — TELEPHONE ENCOUNTER
----- Message from Carlotta Campos sent at 6/4/2019  8:47 AM PDT -----  Regarding: Prescription Question  Contact: [phone]  Josh Arreguin,   Hope you & the baby are  doing well!!   Back in 2017 you had prescribed me 8mg Zofran, I have been using them for my motion sickness on airplanes and longer car rides. I finally ran out an wanted to see if you would mind putting in another prescription of 30  for me, as [place]. I tried to request it through the pharmacy but its not on file any longer.     Let me know! Thanks, Gloria

## 2019-06-26 NOTE — ASSESSMENT & PLAN NOTE
New to discuss, uncontrolled problem.  Started about 2 and half weeks ago when she was exercising and doing Pilates, she felt a pop on her left foot she stopped those exercises.  About a week later developed a very sharp pain in her left foot at this extend from her anterior lower leg area to the anterior lateral side of her left foot.  She described the pain as very quick sharp and sometimes associated with tingling and numbness.  Rated as 6/10 at its worst.  It does not worsen with walking or bearing weight on her foot but it does worsen with extension.  She did not his initial swelling to the area but this has spontaneously resolved within 2 days.  She did not have any associated redness or warmth to the area.  She did try taking ibuprofen 400 mg initially with only mild relief of her symptoms.         0 = independent

## 2019-09-24 NOTE — ED NOTES
Addended by: KRISTYN SHARP on: 9/24/2019 01:15 PM     Modules accepted: Orders     Pt bib family with c/o of sudden intermittent epigastric pain that began earlier today after dinner. Pt states she experienced intense waves of pain that subside. Denies N/V/D. Denies dysuria.

## 2019-10-30 RX ORDER — ONDANSETRON 8 MG/1
TABLET, ORALLY DISINTEGRATING ORAL
Qty: 30 TAB | Refills: 0 | Status: SHIPPED | OUTPATIENT
Start: 2019-10-30 | End: 2020-03-13

## 2019-10-30 NOTE — TELEPHONE ENCOUNTER
Was the patient seen in the last year in this department? Yes  5/13/19  Does patient have an active prescription for medications requested? No     Received Request Via: Pharmacy

## 2019-11-05 ENCOUNTER — OFFICE VISIT (OUTPATIENT)
Dept: MEDICAL GROUP | Facility: LAB | Age: 34
End: 2019-11-05
Payer: COMMERCIAL

## 2019-11-05 VITALS
HEIGHT: 66 IN | WEIGHT: 158 LBS | BODY MASS INDEX: 25.39 KG/M2 | DIASTOLIC BLOOD PRESSURE: 72 MMHG | HEART RATE: 82 BPM | OXYGEN SATURATION: 99 % | SYSTOLIC BLOOD PRESSURE: 126 MMHG | TEMPERATURE: 99.1 F | RESPIRATION RATE: 12 BRPM

## 2019-11-05 DIAGNOSIS — Z00.00 WELL ADULT EXAM: ICD-10-CM

## 2019-11-05 PROCEDURE — 99395 PREV VISIT EST AGE 18-39: CPT | Performed by: NURSE PRACTITIONER

## 2019-11-05 NOTE — PROGRESS NOTES
Chief Complaint   Patient presents with   • Annual Exam   • Orders Needed     US/ for ovarian cyst   • Requesting Labs     including Vit D and Thyroid       HPI:  Carlotta is a 34 y.o. female who presents for annual exam.   She did have a pelvic ultrasound in May which showed a 2 x 1.8 x 2.3 cm right simple ovarian cyst, previously measuring 1.9 x 1.5 x 1.8 on ultrasound in March 2019.  She did not follow-up 6 weeks later for repeat ultrasound through [facility] but did it through Dr. Barfield, told not to worry about it.  Still having right lower pelvic pain and wants to repeat US.   Wants updated labs - taking vit D daily.   She is considering pregnancy in the next 6 months, potentially getting pregnant in February.  Regarding her health maintenance:   Last pap: 1/2018  Abnormal Pap hx:  Always normal  Periods: monthly  Contraception:  IUD - copper  Last Mammo:not applicable.  Denies breast issues   Last colonoscopy:not applicable.  No family hx.  No bowel issues.   Had one in her 20's that was neg.   Bone density test:N\A   Last Lab: due for follow up   Last Td:current   Influenza vaccination:current   Pneumococcal vaccination:not applicable   Hx STD''s: no   Regular exercise: yes      meds:   Current Outpatient Medications   Medication Sig Dispense Refill   • ondansetron (ZOFRAN ODT) 8 MG TABLET DISPERSIBLE TAKE 1 TABLET BY MOUTH EVERY 8 HOURS AS NEEDED 30 Tab 0   • carisoprodol (SOMA) 350 MG Tab TAKE 1 TABLET BY MOUTH 4 TIMES A DAY FOR 30 DAYS 120 Tab 0   • ALPRAZolam (XANAX) 1 MG Tab TAKE 1 TABLET BY MOUTH EVERY DAY AS NEEDED FOR ANXIETY 30 Tab 0   • Echinacea 500 MG Cap Take  by mouth.     • Naltrexone-Bupropion HCl ER (CONTRAVE) 8-90 MG TABLET SR 12 HR Take 2 Tabs by mouth 2 times a day. 360 Tab 1   • omeprazole (PRILOSEC) 20 MG delayed-release capsule Take 2 Caps by mouth every day. 180 Cap 3   • multivitamin (THERAGRAN) Tab Take 1 Tab by mouth every day.     • vitamin D (CHOLECALCIFEROL) 1000 UNIT Tab Take 1,000  Units by mouth every day.     • ibuprofen (MOTRIN) 800 MG Tab Take 800 mg by mouth every 8 hours as needed.       No current facility-administered medications for this visit.        Allergies: No Known Allergies    family:   Family History   Problem Relation Age of Onset   • Thyroid Mother    • Hypertension Mother    • Hyperlipidemia Mother    • Cancer Father         basal    • Thyroid Paternal Aunt    • Thyroid Maternal Grandmother    • Heart Disease Paternal Grandfather         MI        social hx:   Social History     Socioeconomic History   • Marital status: Single     Spouse name: Not on file   • Number of children: Not on file   • Years of education: Not on file   • Highest education level: Not on file   Occupational History   • Not on file   Social Needs   • Financial resource strain: Not on file   • Food insecurity:     Worry: Not on file     Inability: Not on file   • Transportation needs:     Medical: Not on file     Non-medical: Not on file   Tobacco Use   • Smoking status: Never Smoker   • Smokeless tobacco: Never Used   Substance and Sexual Activity   • Alcohol use: Yes     Alcohol/week: 1.8 oz     Types: 3 Glasses of wine per week   • Drug use: No   • Sexual activity: Yes     Partners: Male     Birth control/protection: I.U.D.   Lifestyle   • Physical activity:     Days per week: Not on file     Minutes per session: Not on file   • Stress: Not on file   Relationships   • Social connections:     Talks on phone: Not on file     Gets together: Not on file     Attends Orthodox service: Not on file     Active member of club or organization: Not on file     Attends meetings of clubs or organizations: Not on file     Relationship status: Not on file   • Intimate partner violence:     Fear of current or ex partner: Not on file     Emotionally abused: Not on file     Physically abused: Not on file     Forced sexual activity: Not on file   Other Topics Concern   • Not on file   Social History Narrative   • Not  "on file       ROS:  No fever, chills, sweats.   No polydipsia, polyuria, temperature intolerance, significant weight changes   No visual changes, blurred vision.  No chest pain, palpitations, peripheral swelling   No chronic cough, shortness of breath, dyspnea with exertion.   No dysphagia, odynophagia, black or bloody stools.   No abdominal pain, nausea, persistent diarrhea, constipation   No dysuria, hematuria, incontinence. Denies nocturia  No rash, pruritis, pigment changes.   No focal weakness, syncope, headache, confusion, persistent numbness.   All other systems are reviewed and negative.    PHYSICAL EXAMINATION:  /72 (BP Location: Right arm, Patient Position: Sitting, BP Cuff Size: Adult)   Pulse 82   Temp 37.3 °C (99.1 °F)   Resp 12   Ht 1.676 m (5' 6\")   Wt 71.7 kg (158 lb)   SpO2 99%   General appearance:healthy, well developed, well nourished  Psych: alert, no distress, cooperative  Eyes: EOM's normal, pupils equal, round, reactive to light  ENT: Ears: external ears normal to inspection and palpation, TM's clear, Nose/Sinuses: nose shows no deformity, asymmetry, or inflammation  Neck: no asymmetry, masses, or scars, no adenopathy, thyroid normal to palpation  Lungs:chest symmetric with normal A/P diameter, no chest deformities noted, normal respiratory rate and rhythm  Cardiovascular:regular rate and rhythm, S1 normal  Breasts: normal in size and symmetry, skin normal, physiologic fibronodularity  Abdomen: umbilicus normal, no masses palpable, no organomegaly.  Particularly she does not have any right lower pelvic tenderness today  Musculoskeletal:ROM of all joints is normal, no evidence of joint instability  Lymphatic: None significantly enlarged  Skin: no rash, no edema  Neuro: mental status intact, cranial nerves 2-12 intact    ASSESSMENT/PLAN:  1.annual physical exam: HCM:    Gynecological care is up-to-date with Dr. Barfield  Encourage monthly self breast exam  Encourage daily exercise for " at least 30 minutes  Mammogram starting at age 40  Recommend 1500 mg Calcium with 600 units vit d daily.    Recommend CBC, CMP, TSH, LP - fasting.  Recommend repeat transvaginal ultrasound.  She understands that if she has a positive pregnancy test she should discontinue the use of Xanax, Soma and Contrave.  Influenza vaccine is up-to-date.

## 2019-11-12 ENCOUNTER — HOSPITAL ENCOUNTER (OUTPATIENT)
Dept: LAB | Facility: MEDICAL CENTER | Age: 34
End: 2019-11-12
Attending: NURSE PRACTITIONER
Payer: COMMERCIAL

## 2019-11-12 DIAGNOSIS — Z00.00 WELL ADULT EXAM: ICD-10-CM

## 2019-11-12 LAB
25(OH)D3 SERPL-MCNC: 48 NG/ML (ref 30–100)
ALBUMIN SERPL BCP-MCNC: 4.8 G/DL (ref 3.2–4.9)
ALBUMIN/GLOB SERPL: 2.4 G/DL
ALP SERPL-CCNC: 34 U/L (ref 30–99)
ALT SERPL-CCNC: 13 U/L (ref 2–50)
ANION GAP SERPL CALC-SCNC: 7 MMOL/L (ref 0–11.9)
AST SERPL-CCNC: 17 U/L (ref 12–45)
BASOPHILS # BLD AUTO: 1.7 % (ref 0–1.8)
BASOPHILS # BLD: 0.1 K/UL (ref 0–0.12)
BILIRUB SERPL-MCNC: 0.4 MG/DL (ref 0.1–1.5)
BUN SERPL-MCNC: 14 MG/DL (ref 8–22)
CALCIUM SERPL-MCNC: 9.6 MG/DL (ref 8.5–10.5)
CHLORIDE SERPL-SCNC: 106 MMOL/L (ref 96–112)
CHOLEST SERPL-MCNC: 177 MG/DL (ref 100–199)
CO2 SERPL-SCNC: 26 MMOL/L (ref 20–33)
CREAT SERPL-MCNC: 0.91 MG/DL (ref 0.5–1.4)
EOSINOPHIL # BLD AUTO: 0.31 K/UL (ref 0–0.51)
EOSINOPHIL NFR BLD: 5.2 % (ref 0–6.9)
ERYTHROCYTE [DISTWIDTH] IN BLOOD BY AUTOMATED COUNT: 42.2 FL (ref 35.9–50)
FASTING STATUS PATIENT QL REPORTED: NORMAL
GLOBULIN SER CALC-MCNC: 2 G/DL (ref 1.9–3.5)
GLUCOSE SERPL-MCNC: 69 MG/DL (ref 65–99)
HCT VFR BLD AUTO: 44.8 % (ref 37–47)
HDLC SERPL-MCNC: 70 MG/DL
HGB BLD-MCNC: 14.8 G/DL (ref 12–16)
IMM GRANULOCYTES # BLD AUTO: 0.01 K/UL (ref 0–0.11)
IMM GRANULOCYTES NFR BLD AUTO: 0.2 % (ref 0–0.9)
LDLC SERPL CALC-MCNC: 94 MG/DL
LYMPHOCYTES # BLD AUTO: 2.12 K/UL (ref 1–4.8)
LYMPHOCYTES NFR BLD: 35.3 % (ref 22–41)
MCH RBC QN AUTO: 30.5 PG (ref 27–33)
MCHC RBC AUTO-ENTMCNC: 33 G/DL (ref 33.6–35)
MCV RBC AUTO: 92.2 FL (ref 81.4–97.8)
MONOCYTES # BLD AUTO: 0.46 K/UL (ref 0–0.85)
MONOCYTES NFR BLD AUTO: 7.7 % (ref 0–13.4)
NEUTROPHILS # BLD AUTO: 3.01 K/UL (ref 2–7.15)
NEUTROPHILS NFR BLD: 49.9 % (ref 44–72)
NRBC # BLD AUTO: 0 K/UL
NRBC BLD-RTO: 0 /100 WBC
PLATELET # BLD AUTO: 279 K/UL (ref 164–446)
PMV BLD AUTO: 9.8 FL (ref 9–12.9)
POTASSIUM SERPL-SCNC: 3.7 MMOL/L (ref 3.6–5.5)
PROT SERPL-MCNC: 6.8 G/DL (ref 6–8.2)
RBC # BLD AUTO: 4.86 M/UL (ref 4.2–5.4)
SODIUM SERPL-SCNC: 139 MMOL/L (ref 135–145)
TRIGL SERPL-MCNC: 66 MG/DL (ref 0–149)
TSH SERPL DL<=0.005 MIU/L-ACNC: 1.42 UIU/ML (ref 0.38–5.33)
WBC # BLD AUTO: 6 K/UL (ref 4.8–10.8)

## 2019-11-12 PROCEDURE — 84443 ASSAY THYROID STIM HORMONE: CPT

## 2019-11-12 PROCEDURE — 36415 COLL VENOUS BLD VENIPUNCTURE: CPT

## 2019-11-12 PROCEDURE — 80061 LIPID PANEL: CPT

## 2019-11-12 PROCEDURE — 85025 COMPLETE CBC W/AUTO DIFF WBC: CPT

## 2019-11-12 PROCEDURE — 80053 COMPREHEN METABOLIC PANEL: CPT

## 2019-11-12 PROCEDURE — 82306 VITAMIN D 25 HYDROXY: CPT

## 2019-11-19 ENCOUNTER — HOSPITAL ENCOUNTER (OUTPATIENT)
Dept: RADIOLOGY | Facility: MEDICAL CENTER | Age: 34
End: 2019-11-19
Attending: NURSE PRACTITIONER
Payer: COMMERCIAL

## 2019-11-19 DIAGNOSIS — N83.201 RIGHT OVARIAN CYST: ICD-10-CM

## 2019-11-19 PROCEDURE — 76830 TRANSVAGINAL US NON-OB: CPT

## 2019-12-26 DIAGNOSIS — F41.9 ANXIETY: ICD-10-CM

## 2019-12-26 DIAGNOSIS — M77.8 EXTENSOR TENDONITIS OF FOOT: ICD-10-CM

## 2019-12-30 RX ORDER — CARISOPRODOL 350 MG/1
TABLET ORAL
Qty: 120 TAB | Refills: 0 | Status: SHIPPED
Start: 2019-12-30 | End: 2020-01-29

## 2019-12-30 RX ORDER — ALPRAZOLAM 1 MG/1
TABLET ORAL
Qty: 30 TAB | Refills: 0 | Status: SHIPPED
Start: 2019-12-30 | End: 2020-01-29

## 2019-12-30 NOTE — TELEPHONE ENCOUNTER
Was the patient seen in the last year in this department? Yes  11/5/19  11/15/19     10/30/19 soma  Does patient have an active prescription for medications requested? No     Received Request Via: Pharmacy

## 2020-02-09 DIAGNOSIS — F41.9 ANXIETY: ICD-10-CM

## 2020-02-10 RX ORDER — ALPRAZOLAM 1 MG/1
TABLET ORAL
Qty: 30 TAB | Refills: 0 | Status: SHIPPED
Start: 2020-02-10 | End: 2020-03-11

## 2020-02-10 NOTE — TELEPHONE ENCOUNTER
Received request via: Pharmacy    Was the patient seen in the last year in this department? Yes  11/5/19  12/30/19  Does the patient have an active prescription (recently filled or refills available) for medication(s) requested? No

## 2020-03-08 DIAGNOSIS — M62.838 NECK MUSCLE SPASM: ICD-10-CM

## 2020-03-09 RX ORDER — CARISOPRODOL 350 MG/1
TABLET ORAL
Qty: 120 TAB | Refills: 0 | Status: SHIPPED
Start: 2020-03-09 | End: 2020-04-08

## 2020-03-09 NOTE — TELEPHONE ENCOUNTER
Received request via: Pharmacy   12/30/19  Was the patient seen in the last year in this department? Yes  11/5/19  Does the patient have an active prescription (recently filled or refills available) for medication(s) requested? No

## 2020-03-13 RX ORDER — ONDANSETRON 8 MG/1
TABLET, ORALLY DISINTEGRATING ORAL
Qty: 30 TAB | Refills: 0 | Status: SHIPPED | OUTPATIENT
Start: 2020-03-13 | End: 2020-08-12

## 2020-03-13 NOTE — TELEPHONE ENCOUNTER
Received request via: Pharmacy    Was the patient seen in the last year in this department? Yes  11/5/19  Does the patient have an active prescription (recently filled or refills available) for medication(s) requested? No

## 2020-03-17 NOTE — TELEPHONE ENCOUNTER
1 PAGE(S)  TO/FROM [facility] SERVICES  WITH BIRTH TO THREE REFERRAL FEEDBACK  [x] Received []   Given []   Faxed []   Mailed  03/17/20 for provider []  Dr. Malhotra []  Dr. Forrest [x]  Dr. Henley  []  Michelle Arce NP [] [facility] Provider     Does parent need a copy of form,or do they want it mailed, picked up or faxed? Fax #:     [] PLACED IN PROVIDERS BASKET  [] PLACED IN [facility] BASKET  [x] OTHER - FOLDER     1. Caller Name: Carlotta                                 2. SPECIFIC Action To Be Taken: Orders pending, please sign.

## 2020-05-07 ENCOUNTER — HOSPITAL ENCOUNTER (OUTPATIENT)
Dept: HOSPITAL 8 - OUT | Age: 35
Discharge: HOME | End: 2020-05-07
Attending: OBSTETRICS & GYNECOLOGY
Payer: COMMERCIAL

## 2020-05-07 VITALS — HEIGHT: 67 IN | WEIGHT: 167.77 LBS | BODY MASS INDEX: 26.33 KG/M2

## 2020-05-07 VITALS — SYSTOLIC BLOOD PRESSURE: 112 MMHG | DIASTOLIC BLOOD PRESSURE: 69 MMHG

## 2020-05-07 DIAGNOSIS — Z79.899: ICD-10-CM

## 2020-05-07 DIAGNOSIS — Z90.49: ICD-10-CM

## 2020-05-07 DIAGNOSIS — Z98.890: ICD-10-CM

## 2020-05-07 DIAGNOSIS — O02.1: Primary | ICD-10-CM

## 2020-05-07 LAB
BASOPHILS # BLD AUTO: 0.05 X10^3/UL (ref 0–0.1)
BASOPHILS NFR BLD AUTO: 1 % (ref 0–1)
EOSINOPHIL # BLD AUTO: 0.22 X10^3/UL (ref 0–0.4)
EOSINOPHIL NFR BLD AUTO: 3 % (ref 1–7)
ERYTHROCYTE [DISTWIDTH] IN BLOOD BY AUTOMATED COUNT: 13.1 % (ref 9.6–15.2)
LYMPHOCYTES # BLD AUTO: 1.66 X10^3/UL (ref 1–3.4)
LYMPHOCYTES NFR BLD AUTO: 21 % (ref 22–44)
MCH RBC QN AUTO: 30.4 PG (ref 27–34.8)
MCHC RBC AUTO-ENTMCNC: 33.2 G/DL (ref 32.4–35.8)
MCV RBC AUTO: 91.6 FL (ref 80–100)
MD: NO
MONOCYTES # BLD AUTO: 0.53 X10^3/UL (ref 0.2–0.8)
MONOCYTES NFR BLD AUTO: 7 % (ref 2–9)
NEUTROPHILS # BLD AUTO: 5.34 X10^3/UL (ref 1.8–6.8)
NEUTROPHILS NFR BLD AUTO: 69 % (ref 42–75)
PLATELET # BLD AUTO: 292 X10^3/UL (ref 130–400)
PMV BLD AUTO: 7.8 FL (ref 7.4–10.4)
RBC # BLD AUTO: 4.59 X10^6/UL (ref 3.82–5.3)

## 2020-05-07 PROCEDURE — 36415 COLL VENOUS BLD VENIPUNCTURE: CPT

## 2020-05-07 PROCEDURE — 86850 RBC ANTIBODY SCREEN: CPT

## 2020-05-07 PROCEDURE — 59820 CARE OF MISCARRIAGE: CPT

## 2020-05-07 PROCEDURE — 88305 TISSUE EXAM BY PATHOLOGIST: CPT

## 2020-05-07 PROCEDURE — 86900 BLOOD TYPING SEROLOGIC ABO: CPT

## 2020-05-07 PROCEDURE — 85025 COMPLETE CBC W/AUTO DIFF WBC: CPT

## 2020-06-08 RX ORDER — OMEPRAZOLE 20 MG/1
40 CAPSULE, DELAYED RELEASE ORAL DAILY
Qty: 60 CAP | Refills: 0 | Status: SHIPPED | OUTPATIENT
Start: 2020-06-08 | End: 2020-09-23 | Stop reason: SDUPTHER

## 2020-06-08 NOTE — TELEPHONE ENCOUNTER
Received request via: Pharmacy    Was the patient seen in the last year in this department? Yes LOV 11/5/19    Does the patient have an active prescription (recently filled or refills available) for medication(s) requested? No

## 2020-06-08 NOTE — TELEPHONE ENCOUNTER
Previous Madhav patient. Sent refill as a 30 day supply and informed patient she will need to establish with a new provider for all future requests.

## 2020-08-12 ENCOUNTER — ANESTHESIA EVENT (OUTPATIENT)
Dept: SURGERY | Facility: MEDICAL CENTER | Age: 35
End: 2020-08-12
Payer: COMMERCIAL

## 2020-08-12 RX ORDER — CARISOPRODOL 350 MG/1
350 TABLET ORAL 4 TIMES DAILY
Status: ON HOLD | COMMUNITY
End: 2020-08-13

## 2020-08-13 ENCOUNTER — ANESTHESIA (OUTPATIENT)
Dept: SURGERY | Facility: MEDICAL CENTER | Age: 35
End: 2020-08-13
Payer: COMMERCIAL

## 2020-08-13 ENCOUNTER — HOSPITAL ENCOUNTER (OUTPATIENT)
Facility: MEDICAL CENTER | Age: 35
End: 2020-08-13
Attending: OBSTETRICS & GYNECOLOGY | Admitting: OBSTETRICS & GYNECOLOGY
Payer: COMMERCIAL

## 2020-08-13 VITALS
RESPIRATION RATE: 18 BRPM | BODY MASS INDEX: 27.34 KG/M2 | HEART RATE: 75 BPM | DIASTOLIC BLOOD PRESSURE: 57 MMHG | SYSTOLIC BLOOD PRESSURE: 117 MMHG | WEIGHT: 174.16 LBS | HEIGHT: 67 IN | TEMPERATURE: 98 F | OXYGEN SATURATION: 100 %

## 2020-08-13 LAB
ABO + RH BLD: NORMAL
ABO GROUP BLD: NORMAL
ALBUMIN SERPL BCP-MCNC: 4.5 G/DL (ref 3.2–4.9)
ALBUMIN/GLOB SERPL: 2.1 G/DL
ALP SERPL-CCNC: 38 U/L (ref 30–99)
ALT SERPL-CCNC: 16 U/L (ref 2–50)
ANION GAP SERPL CALC-SCNC: 12 MMOL/L (ref 7–16)
APPEARANCE UR: CLEAR
AST SERPL-CCNC: 9 U/L (ref 12–45)
BILIRUB SERPL-MCNC: 0.4 MG/DL (ref 0.1–1.5)
BILIRUB UR QL STRIP.AUTO: NEGATIVE
BLD GP AB SCN SERPL QL: NORMAL
BUN SERPL-MCNC: 10 MG/DL (ref 8–22)
CALCIUM SERPL-MCNC: 9.2 MG/DL (ref 8.5–10.5)
CHLORIDE SERPL-SCNC: 102 MMOL/L (ref 96–112)
CO2 SERPL-SCNC: 21 MMOL/L (ref 20–33)
COLOR UR: NORMAL
COVID ORDER STATUS COVID19: NORMAL
CREAT SERPL-MCNC: 0.56 MG/DL (ref 0.5–1.4)
ERYTHROCYTE [DISTWIDTH] IN BLOOD BY AUTOMATED COUNT: 40 FL (ref 35.9–50)
GLOBULIN SER CALC-MCNC: 2.1 G/DL (ref 1.9–3.5)
GLUCOSE SERPL-MCNC: 91 MG/DL (ref 65–99)
GLUCOSE UR STRIP.AUTO-MCNC: NEGATIVE MG/DL
HCT VFR BLD AUTO: 40.5 % (ref 37–47)
HGB BLD-MCNC: 14 G/DL (ref 12–16)
KETONES UR STRIP.AUTO-MCNC: NEGATIVE MG/DL
LEUKOCYTE ESTERASE UR QL STRIP.AUTO: NEGATIVE
MCH RBC QN AUTO: 31 PG (ref 27–33)
MCHC RBC AUTO-ENTMCNC: 34.6 G/DL (ref 33.6–35)
MCV RBC AUTO: 89.8 FL (ref 81.4–97.8)
MICRO URNS: NORMAL
NITRITE UR QL STRIP.AUTO: NEGATIVE
PATHOLOGY CONSULT NOTE: NORMAL
PH UR STRIP.AUTO: 8 [PH] (ref 5–8)
PLATELET # BLD AUTO: 238 K/UL (ref 164–446)
PMV BLD AUTO: 9.9 FL (ref 9–12.9)
POTASSIUM SERPL-SCNC: 4.3 MMOL/L (ref 3.6–5.5)
PROT SERPL-MCNC: 6.6 G/DL (ref 6–8.2)
PROT UR QL STRIP: NEGATIVE MG/DL
RBC # BLD AUTO: 4.51 M/UL (ref 4.2–5.4)
RBC UR QL AUTO: NEGATIVE
RH BLD: NORMAL
SARS-COV-2 RDRP RESP QL NAA+PROBE: NOTDETECTED
SODIUM SERPL-SCNC: 135 MMOL/L (ref 135–145)
SP GR UR STRIP.AUTO: 1.02
SPECIMEN SOURCE: NORMAL
UROBILINOGEN UR STRIP.AUTO-MCNC: 0.2 MG/DL
WBC # BLD AUTO: 10.6 K/UL (ref 4.8–10.8)

## 2020-08-13 PROCEDURE — 86900 BLOOD TYPING SEROLOGIC ABO: CPT

## 2020-08-13 PROCEDURE — 502587 HCHG PACK, D&C: Performed by: OBSTETRICS & GYNECOLOGY

## 2020-08-13 PROCEDURE — 160036 HCHG PACU - EA ADDL 30 MINS PHASE I: Performed by: OBSTETRICS & GYNECOLOGY

## 2020-08-13 PROCEDURE — 86901 BLOOD TYPING SEROLOGIC RH(D): CPT

## 2020-08-13 PROCEDURE — 88305 TISSUE EXAM BY PATHOLOGIST: CPT

## 2020-08-13 PROCEDURE — 160046 HCHG PACU - 1ST 60 MINS PHASE II: Performed by: OBSTETRICS & GYNECOLOGY

## 2020-08-13 PROCEDURE — 160048 HCHG OR STATISTICAL LEVEL 1-5: Performed by: OBSTETRICS & GYNECOLOGY

## 2020-08-13 PROCEDURE — 81003 URINALYSIS AUTO W/O SCOPE: CPT

## 2020-08-13 PROCEDURE — 700102 HCHG RX REV CODE 250 W/ 637 OVERRIDE(OP): Performed by: ANESTHESIOLOGY

## 2020-08-13 PROCEDURE — 88233 TISSUE CULTURE SKIN/BIOPSY: CPT

## 2020-08-13 PROCEDURE — 85027 COMPLETE CBC AUTOMATED: CPT

## 2020-08-13 PROCEDURE — 160041 HCHG SURGERY MINUTES - EA ADDL 1 MIN LEVEL 4: Performed by: OBSTETRICS & GYNECOLOGY

## 2020-08-13 PROCEDURE — 501838 HCHG SUTURE GENERAL: Performed by: OBSTETRICS & GYNECOLOGY

## 2020-08-13 PROCEDURE — 700105 HCHG RX REV CODE 258: Performed by: OBSTETRICS & GYNECOLOGY

## 2020-08-13 PROCEDURE — 160002 HCHG RECOVERY MINUTES (STAT): Performed by: OBSTETRICS & GYNECOLOGY

## 2020-08-13 PROCEDURE — 160009 HCHG ANES TIME/MIN: Performed by: OBSTETRICS & GYNECOLOGY

## 2020-08-13 PROCEDURE — 700101 HCHG RX REV CODE 250: Performed by: OBSTETRICS & GYNECOLOGY

## 2020-08-13 PROCEDURE — 160029 HCHG SURGERY MINUTES - 1ST 30 MINS LEVEL 4: Performed by: OBSTETRICS & GYNECOLOGY

## 2020-08-13 PROCEDURE — U0004 COV-19 TEST NON-CDC HGH THRU: HCPCS

## 2020-08-13 PROCEDURE — C9803 HOPD COVID-19 SPEC COLLECT: HCPCS | Performed by: OBSTETRICS & GYNECOLOGY

## 2020-08-13 PROCEDURE — 700102 HCHG RX REV CODE 250 W/ 637 OVERRIDE(OP): Performed by: OBSTETRICS & GYNECOLOGY

## 2020-08-13 PROCEDURE — A9270 NON-COVERED ITEM OR SERVICE: HCPCS | Performed by: ANESTHESIOLOGY

## 2020-08-13 PROCEDURE — 88280 CHROMOSOME KARYOTYPE STUDY: CPT

## 2020-08-13 PROCEDURE — 160025 RECOVERY II MINUTES (STATS): Performed by: OBSTETRICS & GYNECOLOGY

## 2020-08-13 PROCEDURE — 80053 COMPREHEN METABOLIC PANEL: CPT

## 2020-08-13 PROCEDURE — 86850 RBC ANTIBODY SCREEN: CPT

## 2020-08-13 PROCEDURE — 160035 HCHG PACU - 1ST 60 MINS PHASE I: Performed by: OBSTETRICS & GYNECOLOGY

## 2020-08-13 PROCEDURE — 700111 HCHG RX REV CODE 636 W/ 250 OVERRIDE (IP): Performed by: ANESTHESIOLOGY

## 2020-08-13 PROCEDURE — 700101 HCHG RX REV CODE 250: Performed by: ANESTHESIOLOGY

## 2020-08-13 PROCEDURE — A9270 NON-COVERED ITEM OR SERVICE: HCPCS | Performed by: OBSTETRICS & GYNECOLOGY

## 2020-08-13 RX ORDER — OXYTOCIN 10 [USP'U]/ML
INJECTION, SOLUTION INTRAMUSCULAR; INTRAVENOUS
Status: DISCONTINUED
Start: 2020-08-13 | End: 2020-08-13 | Stop reason: HOSPADM

## 2020-08-13 RX ORDER — ONDANSETRON 2 MG/ML
4 INJECTION INTRAMUSCULAR; INTRAVENOUS
Status: DISCONTINUED | OUTPATIENT
Start: 2020-08-13 | End: 2020-08-13 | Stop reason: HOSPADM

## 2020-08-13 RX ORDER — HYDROMORPHONE HYDROCHLORIDE 1 MG/ML
0.2 INJECTION, SOLUTION INTRAMUSCULAR; INTRAVENOUS; SUBCUTANEOUS
Status: DISCONTINUED | OUTPATIENT
Start: 2020-08-13 | End: 2020-08-13 | Stop reason: HOSPADM

## 2020-08-13 RX ORDER — HYDROMORPHONE HYDROCHLORIDE 1 MG/ML
0.4 INJECTION, SOLUTION INTRAMUSCULAR; INTRAVENOUS; SUBCUTANEOUS
Status: DISCONTINUED | OUTPATIENT
Start: 2020-08-13 | End: 2020-08-13 | Stop reason: HOSPADM

## 2020-08-13 RX ORDER — DEXAMETHASONE SODIUM PHOSPHATE 4 MG/ML
INJECTION, SOLUTION INTRA-ARTICULAR; INTRALESIONAL; INTRAMUSCULAR; INTRAVENOUS; SOFT TISSUE PRN
Status: DISCONTINUED | OUTPATIENT
Start: 2020-08-13 | End: 2020-08-13 | Stop reason: SURG

## 2020-08-13 RX ORDER — MIDAZOLAM HYDROCHLORIDE 1 MG/ML
INJECTION INTRAMUSCULAR; INTRAVENOUS PRN
Status: DISCONTINUED | OUTPATIENT
Start: 2020-08-13 | End: 2020-08-13 | Stop reason: SURG

## 2020-08-13 RX ORDER — HYDROMORPHONE HYDROCHLORIDE 1 MG/ML
0.1 INJECTION, SOLUTION INTRAMUSCULAR; INTRAVENOUS; SUBCUTANEOUS
Status: DISCONTINUED | OUTPATIENT
Start: 2020-08-13 | End: 2020-08-13 | Stop reason: HOSPADM

## 2020-08-13 RX ORDER — PROMETHAZINE HYDROCHLORIDE 25 MG/1
12.5 SUPPOSITORY RECTAL EVERY 4 HOURS PRN
Status: DISCONTINUED | OUTPATIENT
Start: 2020-08-13 | End: 2020-08-13 | Stop reason: HOSPADM

## 2020-08-13 RX ORDER — LIDOCAINE HYDROCHLORIDE 20 MG/ML
INJECTION, SOLUTION EPIDURAL; INFILTRATION; INTRACAUDAL; PERINEURAL PRN
Status: DISCONTINUED | OUTPATIENT
Start: 2020-08-13 | End: 2020-08-13 | Stop reason: HOSPADM

## 2020-08-13 RX ORDER — MISOPROSTOL 200 UG/1
TABLET ORAL
Status: DISCONTINUED
Start: 2020-08-13 | End: 2020-08-13 | Stop reason: HOSPADM

## 2020-08-13 RX ORDER — OXYCODONE HYDROCHLORIDE AND ACETAMINOPHEN 5; 325 MG/1; MG/1
1 TABLET ORAL
Status: COMPLETED | OUTPATIENT
Start: 2020-08-13 | End: 2020-08-13

## 2020-08-13 RX ORDER — METHYLERGONOVINE MALEATE 0.2 MG/ML
INJECTION INTRAVENOUS
Status: COMPLETED
Start: 2020-08-13 | End: 2020-08-13

## 2020-08-13 RX ORDER — IBUPROFEN 600 MG/1
600 TABLET ORAL EVERY 6 HOURS PRN
Status: DISCONTINUED | OUTPATIENT
Start: 2020-08-13 | End: 2020-08-13

## 2020-08-13 RX ORDER — METHYLERGONOVINE MALEATE 0.2 MG/ML
INJECTION INTRAVENOUS PRN
Status: DISCONTINUED | OUTPATIENT
Start: 2020-08-13 | End: 2020-08-13 | Stop reason: SURG

## 2020-08-13 RX ORDER — DIPHENHYDRAMINE HYDROCHLORIDE 50 MG/ML
12.5 INJECTION INTRAMUSCULAR; INTRAVENOUS
Status: DISCONTINUED | OUTPATIENT
Start: 2020-08-13 | End: 2020-08-13 | Stop reason: HOSPADM

## 2020-08-13 RX ORDER — HALOPERIDOL 5 MG/ML
1 INJECTION INTRAMUSCULAR
Status: DISCONTINUED | OUTPATIENT
Start: 2020-08-13 | End: 2020-08-13 | Stop reason: HOSPADM

## 2020-08-13 RX ORDER — OXYCODONE HYDROCHLORIDE AND ACETAMINOPHEN 5; 325 MG/1; MG/1
2 TABLET ORAL
Status: COMPLETED | OUTPATIENT
Start: 2020-08-13 | End: 2020-08-13

## 2020-08-13 RX ORDER — SODIUM CHLORIDE, SODIUM LACTATE, POTASSIUM CHLORIDE, CALCIUM CHLORIDE 600; 310; 30; 20 MG/100ML; MG/100ML; MG/100ML; MG/100ML
INJECTION, SOLUTION INTRAVENOUS CONTINUOUS
Status: DISCONTINUED | OUTPATIENT
Start: 2020-08-13 | End: 2020-08-13 | Stop reason: HOSPADM

## 2020-08-13 RX ORDER — SIMETHICONE 80 MG
80 TABLET,CHEWABLE ORAL EVERY 8 HOURS PRN
Status: DISCONTINUED | OUTPATIENT
Start: 2020-08-13 | End: 2020-08-13 | Stop reason: HOSPADM

## 2020-08-13 RX ORDER — IBUPROFEN 600 MG/1
600 TABLET ORAL EVERY 6 HOURS PRN
Status: DISCONTINUED | OUTPATIENT
Start: 2020-08-13 | End: 2020-08-13 | Stop reason: HOSPADM

## 2020-08-13 RX ADMIN — DEXAMETHASONE SODIUM PHOSPHATE 4 MG: 4 INJECTION, SOLUTION INTRA-ARTICULAR; INTRALESIONAL; INTRAMUSCULAR; INTRAVENOUS; SOFT TISSUE at 13:44

## 2020-08-13 RX ADMIN — FENTANYL CITRATE 50 MCG: 50 INJECTION INTRAMUSCULAR; INTRAVENOUS at 13:55

## 2020-08-13 RX ADMIN — POVIDONE-IODINE 15 ML: 10 SOLUTION TOPICAL at 11:45

## 2020-08-13 RX ADMIN — METHYLERGONOVINE MALEATE 200 MCG: 0.2 INJECTION, SOLUTION INTRAMUSCULAR; INTRAVENOUS at 14:00

## 2020-08-13 RX ADMIN — SODIUM CHLORIDE, POTASSIUM CHLORIDE, SODIUM LACTATE AND CALCIUM CHLORIDE: 600; 310; 30; 20 INJECTION, SOLUTION INTRAVENOUS at 12:42

## 2020-08-13 RX ADMIN — FENTANYL CITRATE 50 MCG: 50 INJECTION INTRAMUSCULAR; INTRAVENOUS at 13:38

## 2020-08-13 RX ADMIN — PROPOFOL 30 MG: 10 INJECTION, EMULSION INTRAVENOUS at 13:55

## 2020-08-13 RX ADMIN — PROPOFOL 200 MG: 10 INJECTION, EMULSION INTRAVENOUS at 13:39

## 2020-08-13 RX ADMIN — LIDOCAINE HYDROCHLORIDE 50 MG: 20 INJECTION, SOLUTION EPIDURAL; INFILTRATION; INTRACAUDAL at 13:39

## 2020-08-13 RX ADMIN — OXYCODONE HYDROCHLORIDE AND ACETAMINOPHEN 2 TABLET: 5; 325 TABLET ORAL at 14:55

## 2020-08-13 RX ADMIN — MIDAZOLAM HYDROCHLORIDE 2 MG: 1 INJECTION, SOLUTION INTRAMUSCULAR; INTRAVENOUS at 13:37

## 2020-08-13 ASSESSMENT — FIBROSIS 4 INDEX: FIB4 SCORE: 0.59

## 2020-08-13 ASSESSMENT — PAIN SCALES - GENERAL: PAIN_LEVEL: 2

## 2020-08-13 NOTE — OP REPORT
DATE OF SERVICE:  2020    PREOPERATIVE DIAGNOSIS:  Missed .  8 weeks' gestation by dates.  Crown   rump length, 6 weeks on transvaginal ultrasound with no cardiac activity.    POSTOPERATIVE DIAGNOSIS:  Missed .  8 weeks' gestation by dates.    Crown rump length, 6 weeks on transvaginal ultrasound with no cardiac   activity.    PROCEDURE:  Dilation and suction curettage.    SURGEON:  Mikayla Orellana MD    ANESTHESIOLOGIST:  Julio Danielle MD    TYPE OF ANESTHESIA:  General with LMA.    SPECIMENS:  Products of conception.      FINDINGS:  Midposition uterus.    ESTIMATED BLOOD LOSS:  100 mL.    COMPLICATIONS:  None.    PROCEDURE NOTE:  After informed consent was obtained, the patient was taken to   the operating room where she was given general anesthesia via LMA, and she   was placed in lithotomy position with well-padded Ovidio stirrups.  She was   sterilely prepped and draped.  After a timeout was called, speculum was placed   in the vagina.  Cervix was visualized and grasped with single tooth   tenaculum.  Cervix was dilated to about 7 mm.  A 7 mm curved curette was used   to perform suction curettage.  Once I felt that the uterus was emptied, a   4-quadrant sharp curette was performed.  There was a uterine cry throughout.    The tenaculum was removed.  Cervix was observed.  She was given 1 dose of   Methergine.  There was minimal bleeding and the procedure was felt to be   complete.  The speculum was removed.  Patient was then awoken and taken to   recovery room in stable condition.  There were no complications.  Sponge   counts were correct.  There were no needles used.       ____________________________________     Mikayla Orellana MD    LBT / NTS    DD:  2020 14:10:11  DT:  2020 16:35:59    D#:  [id number]  Job#:  [id number]

## 2020-08-13 NOTE — OR NURSING
1411 to pacu from or waking on arrival oral airway dcd on arrival resp even unlabored   1420 awake alert drinking fluids no pain or nausea report and handoff to Gela bourgeois

## 2020-08-13 NOTE — OR NURSING
1528 Pt to phase II from PACU. Report from ELEONORA Ren. Pt to bathroom, able to void w/o difficulty, dressed w/o assistance. Respirations even and unlabored. VSS. No mat pad bleeding.     1548  brought to bs.     1559 Discharge orders received. Meets discharge criteria at this time. Tolerable pain. No nausea. Tolerating PO. On RA. All lines and monitors discontinued. Reviewed discharge paperwork with pt and spouse. Discussed diet, activity, medications, follow up care and worsening symptoms. No questions at this time. Pt to be discharged to home via private vehicle. Offered hospital escort and wc, pt declined, ambulated out of department with RN, spouse, and all belongings.

## 2020-08-13 NOTE — OR SURGEON
Immediate Post OP Note    PreOp Diagnosis: Missed , 8 weeks by dates, CRL 6weeks    PostOp Diagnosis: same    Procedure(s):  DILATION AND EVACUATION, UTERUS    Surgeon(s):  Mikayla Orellana M.D.    Anesthesiologist/Type of Anesthesia:  Anesthesiologist: Julio Danielle M.D./* No anesthesia type entered *    Surgical Staff:  Circulator: Virgen Albert R.N.  Scrub Person: Sheryl Nassar; Olamide Nguyen    Specimens removed if any:  ID Type Source Tests Collected by Time Destination   A : PRODUCTS OF CONCEPTION Other Other PATHOLOGY SPECIMEN Mikayla Orellana M.D. 2020  1:53 PM        Estimated Blood Loss: 100  Findings: midposition uterus with POC    Complications: none        2020 2:04 PM Mikayla Orellana M.D.

## 2020-08-13 NOTE — DISCHARGE INSTRUCTIONS
ACTIVITY: Rest and take it easy for the first 24 hours.  A responsible adult is recommended to remain with you during that time.  It is normal to feel sleepy.  We encourage you to not do anything that requires balance, judgment or coordination.    MILD FLU-LIKE SYMPTOMS ARE NORMAL. YOU MAY EXPERIENCE GENERALIZED MUSCLE ACHES, THROAT IRRITATION, HEADACHE AND/OR SOME NAUSEA.    FOR 24 HOURS DO NOT:  Drive, operate machinery or run household appliances.  Drink beer or alcoholic beverages.   Make important decisions or sign legal documents.    SPECIAL INSTRUCTIONS: Please see attached instructions    DIET: To avoid nausea, slowly advance diet as tolerated, avoiding spicy or greasy foods for the first day.  Add more substantial food to your diet according to your physician's instructions.  Babies can be fed formula or breast milk as soon as they are hungry.  INCREASE FLUIDS AND FIBER TO AVOID CONSTIPATION.    SURGICAL DRESSING/BATHING: No hot tubs/ bath tubs/ swimming until cleared by your physician. Pelvic rest for 2 weeks or until cleared by your physician    FOLLOW-UP APPOINTMENT:  A follow-up appointment should be arranged with your doctor in 2 weeks; call to schedule.    You should CALL YOUR PHYSICIAN if you develop:  Fever greater than 101 degrees F.  Pain not relieved by medication, or persistent nausea or vomiting.  Excessive bleeding (blood soaking through dressing) or unexpected drainage from the wound.  Extreme redness or swelling around the incision site, drainage of pus or foul smelling drainage.  Inability to urinate or empty your bladder within 8 hours.  Problems with breathing or chest pain.    You should call 911 if you develop problems with breathing or chest pain.  If you are unable to contact your doctor or surgical center, you should go to the nearest emergency room or urgent care center.  Physician's telephone #: Dr. Orellana [phone]    If any questions arise, call your doctor.  If your  doctor is not available, please feel free to call the Surgical Center at [phone].  The Center is open Monday through Friday from 7AM to 7PM.  You can also call the HEALTH HOTLINE open 24 hours/day, 7 days/week and speak to a nurse at [phone], or toll free at [phone].    A registered nurse may call you a few days after your surgery to see how you are doing after your procedure.    MEDICATIONS: Resume taking daily medication.  Take prescribed pain medication with food.  If no medication is prescribed, you may take non-aspirin pain medication if needed.  PAIN MEDICATION CAN BE VERY CONSTIPATING.  Take a stool softener or laxative such as senokot, pericolace, or milk of magnesia if needed.    Prescription given for NONE.  Last pain medication given at Percocet 10mg @ 3:00pm, you may take the next dose of Tylenol after 9:00pm.    If your physician has prescribed pain medication that includes Acetaminophen (Tylenol), do not take additional Acetaminophen (Tylenol) while taking the prescribed medication.    Depression / Suicide Risk    As you are discharged from this [facility] facility, it is important to learn how to keep safe from harming yourself.    Recognize the warning signs:  · Abrupt changes in personality, positive or negative- including increase in energy   · Giving away possessions  · Change in eating patterns- significant weight changes-  positive or negative  · Change in sleeping patterns- unable to sleep or sleeping all the time   · Unwillingness or inability to communicate  · Depression  · Unusual sadness, discouragement and loneliness  · Talk of wanting to die  · Neglect of personal appearance   · Rebelliousness- reckless behavior  · Withdrawal from people/activities they love  · Confusion- inability to concentrate     If you or a loved one observes any of these behaviors or has concerns about self-harm, here's what you can do:  · Talk about it- your feelings and reasons for harming  yourself  · Remove any means that you might use to hurt yourself (examples: pills, rope, extension cords, firearm)  · Get professional help from the community (Mental Health, Substance Abuse, psychological counseling)  · Do not be alone:Call your Safe Contact- someone whom you trust who will be there for you.  · Call your local CRISIS HOTLINE 117-7144 or 466-473-0743  · Call your local Children's Mobile Crisis Response Team [place] (739) 565-7180 or www.[employer]  · Call the toll free National Suicide Prevention Hotlines   · National Suicide Prevention Lifeline 638-456-RBOP (7880)  · National Hope Line Network 800-SUICIDE (811-7044)

## 2020-08-13 NOTE — ANESTHESIA POSTPROCEDURE EVALUATION
Patient: Carlotta Campos    Procedure Summary     Date: 20 Room / Location: [facility] ROOM 25 / SURGERY SAME DAY [facility] ORS    Anesthesia Start: 1334 Anesthesia Stop: 1415    Procedure: DILATION AND EVACUATION, UTERUS (N/A Vagina ) Diagnosis: (MISSED )    Surgeon: Mikayla Orellana M.D. Responsible Provider: Julio Danielle M.D.    Anesthesia Type: general ASA Status: 1          Final Anesthesia Type: general  Last vitals  BP   Blood Pressure: 115/61    Temp   36.3 °C (97.4 °F)    Pulse   Pulse: 74   Resp   16    SpO2   99 %      Anesthesia Post Evaluation    Patient location during evaluation: PACU  Patient participation: complete - patient participated  Level of consciousness: awake and alert  Pain score: 2    Airway patency: patent  Anesthetic complications: no  Cardiovascular status: hemodynamically stable  Respiratory status: acceptable  Hydration status: euvolemic    PONV: none

## 2020-08-13 NOTE — ANESTHESIA PROCEDURE NOTES
Airway    Date/Time: 8/13/2020 1:41 PM  Performed by: Julio Danielle M.D.  Authorized by: Julio Danielle M.D.     Location:  OR  Urgency:  Elective  Indications for Airway Management:  Anesthesia      Spontaneous Ventilation: absent    Sedation Level:  Deep  Preoxygenated: Yes    Mask Difficulty Assessment:  1 - vent by mask  Final Airway Type:  Supraglottic airway  Final Supraglottic Airway:  Standard LMA    SGA Size:  4  Number of Attempts at Approach:  1

## 2020-08-13 NOTE — ANESTHESIA QCDR
2019 [facility] Clinical Data Registry (for Quality Improvement)     Postoperative nausea/vomiting risk protocol (Adult = 18 yrs and Pediatric 3-17 yrs)- (430 and 463)  General inhalation anesthetic (NOT TIVA) with PONV risk factors: Yes  Provision of anti-emetic therapy with at least 2 different classes of agents: Yes   Patient DID NOT receive anti-emetic therapy and reason is documented in Medical Record:  N/A    Multimodal Pain Management- (477)  Non-emergent surgery AND patient age >= 18: No  Use of Multimodal Pain Management, two or more drugs and/or interventions, NOT including systemic opioids:   Exception: Documented allergy to multiple classes of analgesics:     Smoking Abstinence (404)  Patient is current smoker (cigarette, pipe, e-cig, marijuanna): No  Elective Surgery:   Abstinence instructions provided prior to day of surgery:   Patient abstained from smoking on day of surgery:     Pre-Op Beta-Blocker in Isolated CABG (44)  Isolated CABG AND patient age >= 18: No  Beta-blocker admin within 24 hours of surgical incision:   Exception:of medical reason(s) for not administering beta blocker within 24 hours prior to surgical incision (e.g., not  indicated,other medical reason):     PACU assessment of acute postoperative pain prior to Anesthesia Care End- Applies to Patients Age = 18- (ABG7)  Initial PACU pain score is which of the following: < 7/10  Patient unable to report pain score: N/A    Post-anesthetic transfer of care checklist/protocol to PACU/ICU- (426 and 427)  Upon conclusion of case, patient transferred to which of the following locations: PACU/Non-ICU  Use of transfer checklist/protocol: Yes  Exclusion: Service Performed in Patient Hospital Room (and thus did not require transfer): N/A  Unplanned admission to ICU related to anesthesia service up through end of PACU care- (MD51)  Unplanned admission to ICU (not initially anticipated at anesthesia start time): No

## 2020-08-13 NOTE — OR NURSING
1420- report received from Kelsea LICONA RN. Warm pack in place.    1455- pt medicated with oral pain medication for 5/10 pain     1528- pt meets criteria for phase 2, report to Adriana CRESW, pt moved to PACU2

## 2020-08-13 NOTE — ANESTHESIA PREPROCEDURE EVALUATION
Carlotta Campos is a female presenting for D and E at 6 weeks.     # Eosinophilic esophagitis, denies issues relating to previous intubation, able to lay flat without reflux    Patient denies history of seizures or strokes  Patient denies history of diabetes or thyroid disease  Patient denies history of JODI  Patient denies history of previous MI, chest pain or shortness of breath  Patient denies history of renal failure  Patient denies history of upper or lower extremity motor/sensory deficits   Patient denies history of bleeding disorders  Patient denies history of complications with anesthesia    Able to climb 2 flights of stair without dyspnea or angina, > 4 METs     Past Surgical History:   Procedure Laterality Date   • APPENDECTOMY     • CHOLECYSTECTOMY          Social History     Tobacco Use   Smoking Status Never Smoker   Smokeless Tobacco Never Used        Social History     Substance and Sexual Activity   Alcohol Use Yes   • Alcohol/week: 1.8 oz   • Types: 3 Glasses of wine per week      Current Outpatient Medications   Medication Instructions   • carisoprodol (SOMA) 350 mg, Oral, 4 TIMES DAILY   • Doxylamine Succinate, Sleep, (UNISOM PO) Oral   • multivitamin (THERAGRAN) Tab 1 Tab, Oral, DAILY   • omeprazole (PRILOSEC) 40 mg, Oral, DAILY   • Prenatal MV-Min-Fe Fum-FA-DHA (PRENATAL 1 PO) Oral         Current Facility-Administered Medications:   •  MISOPROSTOL 200 MCG PO TABS, , , ,   •  SILVER NITRATE-POT NITRATE 75-25 % EX MISC, , , ,   •  METHYLERGONOVINE MALEATE 0.2 MG/ML INJ SOLN, , , ,   •  OXYTOCIN 10 UNIT/ML INJ SOLN, , , ,   •  lactated ringers infusion, , Intravenous, Continuous, Mikayla Orellana M.D., Last Rate: 10 mL/hr at 08/13/20 1242     Allergies   Allergen Reactions   • Hydrocodone      nausea       Vitals:    08/13/20 1112   BP: 115/61   Pulse: 74   Resp: 16   Temp: 36.3 °C (97.4 °F)   SpO2: 99%        Recent Labs     08/13/20  1207   ALBUMIN 4.5   TBILIRUBIN 0.4   ALKPHOSPHAT 38    TOTPROTEIN 6.6   ALTSGPT 16   ASTSGOT 9*   CREATININE 0.56      Hct 40.5, K 4.3, Cr 0.56        Relevant Problems   No relevant active problems       Physical Exam    Airway   Mallampati: II  TM distance: >3 FB  Neck ROM: full       Cardiovascular - normal exam  Rhythm: regular  Rate: normal  (-) murmur     Dental - normal exam           Pulmonary - normal exam  Breath sounds clear to auscultation     Abdominal    Neurological - normal exam                 Anesthesia Plan    ASA 1       Plan - general       Airway plan will be LMA        Induction: intravenous    Postoperative Plan: Postoperative administration of opioids is intended.    Pertinent diagnostic labs and testing reviewed    Informed Consent:    Anesthetic plan and risks discussed with patient.    Use of blood products discussed with: patient whom consented to blood products.

## 2020-08-13 NOTE — ANESTHESIA TIME REPORT
Anesthesia Start and Stop Event Times     Date Time Event    2020 1334 Anesthesia Start     1415 Anesthesia Stop        Responsible Staff  20    Name Role Begin End    Julio Danielle M.D. Anesth 1334 1415        Preop Diagnosis (Free Text):  Pre-op Diagnosis     MISSED         Preop Diagnosis (Codes):    Post op Diagnosis  Missed       Premium Reason  Non-Premium    Comments:

## 2020-09-23 ENCOUNTER — OFFICE VISIT (OUTPATIENT)
Dept: MEDICAL GROUP | Facility: LAB | Age: 35
End: 2020-09-23
Payer: COMMERCIAL

## 2020-09-23 VITALS
HEIGHT: 66 IN | HEART RATE: 68 BPM | OXYGEN SATURATION: 96 % | WEIGHT: 169 LBS | BODY MASS INDEX: 27.16 KG/M2 | DIASTOLIC BLOOD PRESSURE: 78 MMHG | SYSTOLIC BLOOD PRESSURE: 110 MMHG | RESPIRATION RATE: 12 BRPM | TEMPERATURE: 99.6 F

## 2020-09-23 DIAGNOSIS — K20.0 EOSINOPHILIC ESOPHAGITIS: ICD-10-CM

## 2020-09-23 DIAGNOSIS — Z00.00 WELL ADULT EXAM: ICD-10-CM

## 2020-09-23 DIAGNOSIS — M62.838 NECK MUSCLE SPASM: ICD-10-CM

## 2020-09-23 DIAGNOSIS — F41.9 ANXIETY: ICD-10-CM

## 2020-09-23 PROCEDURE — 99214 OFFICE O/P EST MOD 30 MIN: CPT | Performed by: FAMILY MEDICINE

## 2020-09-23 RX ORDER — OMEPRAZOLE 20 MG/1
40 CAPSULE, DELAYED RELEASE ORAL DAILY
Qty: 180 CAP | Refills: 3 | Status: SHIPPED | OUTPATIENT
Start: 2020-09-23 | End: 2021-06-04 | Stop reason: SDUPTHER

## 2020-09-23 RX ORDER — DIPHENHYDRAMINE HCL 25 MG
25 TABLET ORAL EVERY 6 HOURS PRN
Status: ON HOLD | COMMUNITY
End: 2022-05-26

## 2020-09-23 RX ORDER — CARISOPRODOL 350 MG/1
350 TABLET ORAL
Qty: 40 TAB | Refills: 0 | Status: SHIPPED | OUTPATIENT
Start: 2020-09-23 | End: 2020-12-11 | Stop reason: SDUPTHER

## 2020-09-23 RX ORDER — ALPRAZOLAM 1 MG/1
1 TABLET ORAL
Qty: 30 TAB | Refills: 0 | Status: SHIPPED | OUTPATIENT
Start: 2020-09-23 | End: 2020-12-11 | Stop reason: SDUPTHER

## 2020-09-23 RX ORDER — CLINDAMYCIN PHOSPHATE 10 MG/ML
SOLUTION TOPICAL
Status: ON HOLD | COMMUNITY
End: 2022-05-26

## 2020-09-23 SDOH — HEALTH STABILITY: MENTAL HEALTH: HOW OFTEN DO YOU HAVE A DRINK CONTAINING ALCOHOL?: MONTHLY OR LESS

## 2020-09-23 ASSESSMENT — ENCOUNTER SYMPTOMS
CONSTIPATION: 0
HEADACHES: 0
VOMITING: 0
ABDOMINAL PAIN: 0
COUGH: 0
SORE THROAT: 0
PALPITATIONS: 0
FEVER: 0
WHEEZING: 0
SHORTNESS OF BREATH: 0
DIARRHEA: 0
MYALGIAS: 0
DIZZINESS: 0
NAUSEA: 0
DEPRESSION: 0
FOCAL WEAKNESS: 0
BLURRED VISION: 0
CHILLS: 0

## 2020-09-23 ASSESSMENT — PATIENT HEALTH QUESTIONNAIRE - PHQ9: CLINICAL INTERPRETATION OF PHQ2 SCORE: 0

## 2020-09-23 ASSESSMENT — FIBROSIS 4 INDEX: FIB4 SCORE: 0.33
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
## 2020-10-14 ENCOUNTER — HOSPITAL ENCOUNTER (OUTPATIENT)
Dept: LAB | Facility: MEDICAL CENTER | Age: 35
End: 2020-10-14
Attending: FAMILY MEDICINE
Payer: COMMERCIAL

## 2020-10-14 DIAGNOSIS — Z00.00 WELL ADULT EXAM: ICD-10-CM

## 2020-10-14 LAB
CHOLEST SERPL-MCNC: 207 MG/DL (ref 100–199)
FASTING STATUS PATIENT QL REPORTED: NORMAL
HDLC SERPL-MCNC: 70 MG/DL
LDLC SERPL CALC-MCNC: 125 MG/DL
TRIGL SERPL-MCNC: 61 MG/DL (ref 0–149)
TSH SERPL DL<=0.005 MIU/L-ACNC: 1.18 UIU/ML (ref 0.38–5.33)

## 2020-10-14 PROCEDURE — 82306 VITAMIN D 25 HYDROXY: CPT

## 2020-10-14 PROCEDURE — 36415 COLL VENOUS BLD VENIPUNCTURE: CPT

## 2020-10-14 PROCEDURE — 80061 LIPID PANEL: CPT

## 2020-10-14 PROCEDURE — 84443 ASSAY THYROID STIM HORMONE: CPT

## 2020-10-15 LAB — 25(OH)D3 SERPL-MCNC: 48 NG/ML (ref 30–100)

## 2020-11-18 ENCOUNTER — HOSPITAL ENCOUNTER (OUTPATIENT)
Dept: LAB | Facility: MEDICAL CENTER | Age: 35
End: 2020-11-18
Attending: OBSTETRICS & GYNECOLOGY
Payer: COMMERCIAL

## 2020-11-18 LAB
B-HCG SERPL-ACNC: <1 MIU/ML (ref 0–5)
BASOPHILS # BLD AUTO: 1.2 % (ref 0–1.8)
BASOPHILS # BLD: 0.1 K/UL (ref 0–0.12)
EOSINOPHIL # BLD AUTO: 0.41 K/UL (ref 0–0.51)
EOSINOPHIL NFR BLD: 4.8 % (ref 0–6.9)
ERYTHROCYTE [DISTWIDTH] IN BLOOD BY AUTOMATED COUNT: 41.8 FL (ref 35.9–50)
HCT VFR BLD AUTO: 44.8 % (ref 37–47)
HGB BLD-MCNC: 14.7 G/DL (ref 12–16)
IMM GRANULOCYTES # BLD AUTO: 0.02 K/UL (ref 0–0.11)
IMM GRANULOCYTES NFR BLD AUTO: 0.2 % (ref 0–0.9)
LYMPHOCYTES # BLD AUTO: 2.82 K/UL (ref 1–4.8)
LYMPHOCYTES NFR BLD: 33.1 % (ref 22–41)
MCH RBC QN AUTO: 29.9 PG (ref 27–33)
MCHC RBC AUTO-ENTMCNC: 32.8 G/DL (ref 33.6–35)
MCV RBC AUTO: 91.2 FL (ref 81.4–97.8)
MONOCYTES # BLD AUTO: 0.54 K/UL (ref 0–0.85)
MONOCYTES NFR BLD AUTO: 6.3 % (ref 0–13.4)
NEUTROPHILS # BLD AUTO: 4.63 K/UL (ref 2–7.15)
NEUTROPHILS NFR BLD: 54.4 % (ref 44–72)
NRBC # BLD AUTO: 0 K/UL
NRBC BLD-RTO: 0 /100 WBC
PLATELET # BLD AUTO: 276 K/UL (ref 164–446)
PMV BLD AUTO: 10 FL (ref 9–12.9)
RBC # BLD AUTO: 4.91 M/UL (ref 4.2–5.4)
WBC # BLD AUTO: 8.5 K/UL (ref 4.8–10.8)

## 2020-11-18 PROCEDURE — 81003 URINALYSIS AUTO W/O SCOPE: CPT

## 2020-11-18 PROCEDURE — 85025 COMPLETE CBC W/AUTO DIFF WBC: CPT

## 2020-11-18 PROCEDURE — 84702 CHORIONIC GONADOTROPIN TEST: CPT

## 2020-11-18 PROCEDURE — 36415 COLL VENOUS BLD VENIPUNCTURE: CPT

## 2020-11-19 LAB
APPEARANCE UR: CLEAR
BILIRUB UR QL STRIP.AUTO: NEGATIVE
COLOR UR: YELLOW
GLUCOSE UR STRIP.AUTO-MCNC: NEGATIVE MG/DL
KETONES UR STRIP.AUTO-MCNC: NEGATIVE MG/DL
LEUKOCYTE ESTERASE UR QL STRIP.AUTO: NEGATIVE
MICRO URNS: NORMAL
NITRITE UR QL STRIP.AUTO: NEGATIVE
PH UR STRIP.AUTO: 6.5 [PH] (ref 5–8)
PROT UR QL STRIP: NEGATIVE MG/DL
RBC UR QL AUTO: NEGATIVE
SP GR UR STRIP.AUTO: 1.02
UROBILINOGEN UR STRIP.AUTO-MCNC: 0.2 MG/DL

## 2020-12-11 ENCOUNTER — TELEMEDICINE (OUTPATIENT)
Dept: MEDICAL GROUP | Facility: LAB | Age: 35
End: 2020-12-11
Payer: COMMERCIAL

## 2020-12-11 VITALS — TEMPERATURE: 97.2 F | BODY MASS INDEX: 25.58 KG/M2 | HEART RATE: 92 BPM | WEIGHT: 163 LBS | HEIGHT: 67 IN

## 2020-12-11 DIAGNOSIS — M62.838 NECK MUSCLE SPASM: ICD-10-CM

## 2020-12-11 DIAGNOSIS — F41.1 GENERALIZED ANXIETY DISORDER WITH PANIC ATTACKS: ICD-10-CM

## 2020-12-11 DIAGNOSIS — F41.0 GENERALIZED ANXIETY DISORDER WITH PANIC ATTACKS: ICD-10-CM

## 2020-12-11 PROCEDURE — 99213 OFFICE O/P EST LOW 20 MIN: CPT | Mod: 95,CR | Performed by: FAMILY MEDICINE

## 2020-12-11 RX ORDER — MULTIVIT-MIN/IRON/FOLIC ACID/K 18-600-40
CAPSULE ORAL
COMMUNITY
End: 2022-03-28

## 2020-12-11 RX ORDER — CARISOPRODOL 350 MG/1
350 TABLET ORAL
Qty: 50 TAB | Refills: 0 | Status: SHIPPED | OUTPATIENT
Start: 2020-12-11 | End: 2021-03-10 | Stop reason: SDUPTHER

## 2020-12-11 RX ORDER — ALPRAZOLAM 1 MG/1
1 TABLET ORAL
Qty: 30 TAB | Refills: 0 | Status: SHIPPED | OUTPATIENT
Start: 2020-12-11 | End: 2021-03-10 | Stop reason: SDUPTHER

## 2020-12-11 ASSESSMENT — FIBROSIS 4 INDEX: FIB4 SCORE: 0.29
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
## 2021-03-04 ENCOUNTER — HOSPITAL ENCOUNTER (OUTPATIENT)
Dept: LAB | Facility: MEDICAL CENTER | Age: 36
End: 2021-03-04
Attending: OBSTETRICS & GYNECOLOGY
Payer: COMMERCIAL

## 2021-03-04 LAB
25(OH)D3 SERPL-MCNC: 49 NG/ML (ref 30–100)
HBV SURFACE AB SERPL IA-ACNC: 1362 MIU/ML (ref 0–10)
HBV SURFACE AG SER QL: NORMAL
HCV AB SER QL: NORMAL
HIV 1+2 AB+HIV1 P24 AG SERPL QL IA: NORMAL
PROLACTIN SERPL-MCNC: 24.4 NG/ML (ref 2.8–26)
TREPONEMA PALLIDUM IGG+IGM AB [PRESENCE] IN SERUM OR PLASMA BY IMMUNOASSAY: NORMAL
TSH SERPL DL<=0.005 MIU/L-ACNC: 1.51 UIU/ML (ref 0.38–5.33)

## 2021-03-04 PROCEDURE — 36415 COLL VENOUS BLD VENIPUNCTURE: CPT

## 2021-03-04 PROCEDURE — 83520 IMMUNOASSAY QUANT NOS NONAB: CPT

## 2021-03-04 PROCEDURE — 86780 TREPONEMA PALLIDUM: CPT

## 2021-03-04 PROCEDURE — 84443 ASSAY THYROID STIM HORMONE: CPT

## 2021-03-04 PROCEDURE — 86803 HEPATITIS C AB TEST: CPT

## 2021-03-04 PROCEDURE — 82306 VITAMIN D 25 HYDROXY: CPT

## 2021-03-04 PROCEDURE — 87389 HIV-1 AG W/HIV-1&-2 AB AG IA: CPT

## 2021-03-04 PROCEDURE — 87340 HEPATITIS B SURFACE AG IA: CPT

## 2021-03-04 PROCEDURE — 84146 ASSAY OF PROLACTIN: CPT

## 2021-03-04 PROCEDURE — 87591 N.GONORRHOEAE DNA AMP PROB: CPT

## 2021-03-04 PROCEDURE — 87491 CHLMYD TRACH DNA AMP PROBE: CPT

## 2021-03-04 PROCEDURE — 86706 HEP B SURFACE ANTIBODY: CPT

## 2021-03-06 LAB
C TRACH DNA SPEC QL NAA+PROBE: NEGATIVE
N GONORRHOEA DNA SPEC QL NAA+PROBE: NEGATIVE
SPECIMEN SOURCE: NORMAL

## 2021-03-07 LAB — MIS SERPL-MCNC: 2.41 NG/ML (ref 0.18–11.71)

## 2021-03-10 ENCOUNTER — TELEMEDICINE (OUTPATIENT)
Dept: MEDICAL GROUP | Facility: LAB | Age: 36
End: 2021-03-10
Payer: COMMERCIAL

## 2021-03-10 VITALS — WEIGHT: 169 LBS | BODY MASS INDEX: 26.53 KG/M2 | HEIGHT: 67 IN

## 2021-03-10 DIAGNOSIS — F41.0 GENERALIZED ANXIETY DISORDER WITH PANIC ATTACKS: ICD-10-CM

## 2021-03-10 DIAGNOSIS — M62.838 NECK MUSCLE SPASM: ICD-10-CM

## 2021-03-10 DIAGNOSIS — F41.1 GENERALIZED ANXIETY DISORDER WITH PANIC ATTACKS: ICD-10-CM

## 2021-03-10 PROCEDURE — 99214 OFFICE O/P EST MOD 30 MIN: CPT | Mod: 95,CR | Performed by: FAMILY MEDICINE

## 2021-03-10 RX ORDER — CARISOPRODOL 350 MG/1
350 TABLET ORAL
Qty: 50 TABLET | Refills: 0 | Status: SHIPPED | OUTPATIENT
Start: 2021-03-10 | End: 2021-06-04 | Stop reason: SDUPTHER

## 2021-03-10 RX ORDER — ALPRAZOLAM 1 MG/1
1 TABLET ORAL
Qty: 30 TABLET | Refills: 0 | Status: SHIPPED | OUTPATIENT
Start: 2021-03-10 | End: 2021-06-04 | Stop reason: SDUPTHER

## 2021-03-10 RX ORDER — MULTIVIT-MIN/IRON/FOLIC ACID/K 18-600-40
CAPSULE ORAL
COMMUNITY
End: 2022-03-28

## 2021-03-10 ASSESSMENT — FIBROSIS 4 INDEX: FIB4 SCORE: 0.29

## 2021-03-10 ASSESSMENT — PATIENT HEALTH QUESTIONNAIRE - PHQ9: CLINICAL INTERPRETATION OF PHQ2 SCORE: 0
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
## 2021-06-04 ENCOUNTER — TELEMEDICINE (OUTPATIENT)
Dept: MEDICAL GROUP | Facility: LAB | Age: 36
End: 2021-06-04
Payer: COMMERCIAL

## 2021-06-04 VITALS — HEIGHT: 67 IN | TEMPERATURE: 97.6 F | BODY MASS INDEX: 26.37 KG/M2 | WEIGHT: 168 LBS

## 2021-06-04 DIAGNOSIS — F41.1 GENERALIZED ANXIETY DISORDER WITH PANIC ATTACKS: ICD-10-CM

## 2021-06-04 DIAGNOSIS — K20.0 EOSINOPHILIC ESOPHAGITIS: ICD-10-CM

## 2021-06-04 DIAGNOSIS — M62.838 NECK MUSCLE SPASM: ICD-10-CM

## 2021-06-04 DIAGNOSIS — F41.0 GENERALIZED ANXIETY DISORDER WITH PANIC ATTACKS: ICD-10-CM

## 2021-06-04 PROCEDURE — 99214 OFFICE O/P EST MOD 30 MIN: CPT | Mod: 95 | Performed by: FAMILY MEDICINE

## 2021-06-04 RX ORDER — DOXYCYCLINE HYCLATE 100 MG
100 TABLET ORAL 2 TIMES DAILY
COMMUNITY
End: 2022-03-28

## 2021-06-04 RX ORDER — CARISOPRODOL 350 MG/1
350 TABLET ORAL
Qty: 50 TABLET | Refills: 0 | Status: SHIPPED | OUTPATIENT
Start: 2021-06-04 | End: 2021-09-01 | Stop reason: SDUPTHER

## 2021-06-04 RX ORDER — OMEPRAZOLE 20 MG/1
40 CAPSULE, DELAYED RELEASE ORAL DAILY
Qty: 180 CAPSULE | Refills: 3 | Status: ON HOLD | OUTPATIENT
Start: 2021-06-04 | End: 2022-05-26

## 2021-06-04 RX ORDER — ALPRAZOLAM 1 MG/1
1 TABLET ORAL
Qty: 30 TABLET | Refills: 0 | Status: SHIPPED | OUTPATIENT
Start: 2021-06-04 | End: 2021-09-01 | Stop reason: SDUPTHER

## 2021-06-04 ASSESSMENT — ANXIETY QUESTIONNAIRES
5. BEING SO RESTLESS THAT IT IS HARD TO SIT STILL: SEVERAL DAYS
2. NOT BEING ABLE TO STOP OR CONTROL WORRYING: NOT AT ALL
1. FEELING NERVOUS, ANXIOUS, OR ON EDGE: SEVERAL DAYS
4. TROUBLE RELAXING: SEVERAL DAYS
6. BECOMING EASILY ANNOYED OR IRRITABLE: NOT AT ALL
3. WORRYING TOO MUCH ABOUT DIFFERENT THINGS: NOT AT ALL
7. FEELING AFRAID AS IF SOMETHING AWFUL MIGHT HAPPEN: NOT AT ALL
GAD7 TOTAL SCORE: 3

## 2021-06-04 ASSESSMENT — FIBROSIS 4 INDEX: FIB4 SCORE: 0.29
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
## 2021-07-02 ENCOUNTER — HOSPITAL ENCOUNTER (OUTPATIENT)
Dept: LAB | Facility: MEDICAL CENTER | Age: 36
End: 2021-07-02
Attending: OBSTETRICS & GYNECOLOGY
Payer: COMMERCIAL

## 2021-07-02 PROCEDURE — 85613 RUSSELL VIPER VENOM DILUTED: CPT

## 2021-07-02 PROCEDURE — 85730 THROMBOPLASTIN TIME PARTIAL: CPT

## 2021-07-02 PROCEDURE — 86147 CARDIOLIPIN ANTIBODY EA IG: CPT | Mod: 91

## 2021-07-02 PROCEDURE — 86148 ANTI-PHOSPHOLIPID ANTIBODY: CPT | Mod: 91

## 2021-07-02 PROCEDURE — 85610 PROTHROMBIN TIME: CPT

## 2021-07-02 PROCEDURE — 36415 COLL VENOUS BLD VENIPUNCTURE: CPT

## 2021-07-02 PROCEDURE — 86146 BETA-2 GLYCOPROTEIN ANTIBODY: CPT

## 2021-07-02 PROCEDURE — 85520 HEPARIN ASSAY: CPT

## 2021-07-03 LAB
APTT PPP: 26.4 SEC (ref 24.7–36)
INR PPP: 0.97 (ref 0.87–1.13)
LA PPP-IMP: NORMAL
PROTHROMBIN TIME: 12.6 SEC (ref 12–14.6)
SCREEN DRVVT: 33.6 SEC (ref 28–48)
UFH PPP CHRO-ACNC: <0.1 U/ML

## 2021-07-04 LAB
B2 GLYCOPROT1 IGA SER-ACNC: 2 SAU (ref 0–20)
B2 GLYCOPROT1 IGG SERPL IA-ACNC: 11 SGU (ref 0–20)
B2 GLYCOPROT1 IGM SERPL IA-ACNC: 5 SMU (ref 0–20)

## 2021-07-06 LAB
CARDIOLIPIN IGA SER IA-ACNC: <10 APL (ref 0–11)
CARDIOLIPIN IGG SER IA-ACNC: <10 GPL (ref 0–14)
CARDIOLIPIN IGM SER IA-ACNC: 13 MPL (ref 0–12)

## 2021-07-08 LAB
PS IGA SER IA-ACNC: 0 APS (ref 0–19)
PS IGG SER IA-ACNC: 0 GPS (ref 0–15)
PS IGM SER IA-ACNC: 4 MPS (ref 0–21)

## 2021-08-16 ENCOUNTER — HOSPITAL ENCOUNTER (OUTPATIENT)
Dept: LAB | Facility: MEDICAL CENTER | Age: 36
End: 2021-08-16
Attending: OBSTETRICS & GYNECOLOGY
Payer: COMMERCIAL

## 2021-08-16 PROCEDURE — 36415 COLL VENOUS BLD VENIPUNCTURE: CPT

## 2021-08-16 PROCEDURE — 86147 CARDIOLIPIN ANTIBODY EA IG: CPT | Mod: 91

## 2021-08-18 LAB
CARDIOLIPIN IGA SER IA-ACNC: <10 APL (ref 0–11)
CARDIOLIPIN IGG SER IA-ACNC: <10 GPL (ref 0–14)
CARDIOLIPIN IGM SER IA-ACNC: <10 MPL (ref 0–12)

## 2021-09-01 ENCOUNTER — TELEMEDICINE (OUTPATIENT)
Dept: MEDICAL GROUP | Facility: LAB | Age: 36
End: 2021-09-01
Payer: COMMERCIAL

## 2021-09-01 VITALS — TEMPERATURE: 98.4 F | HEIGHT: 67 IN | WEIGHT: 169 LBS | BODY MASS INDEX: 26.53 KG/M2 | HEART RATE: 60 BPM

## 2021-09-01 DIAGNOSIS — Z13.6 SCREENING FOR CARDIOVASCULAR CONDITION: ICD-10-CM

## 2021-09-01 DIAGNOSIS — Z13.1 SCREENING FOR DIABETES MELLITUS: ICD-10-CM

## 2021-09-01 DIAGNOSIS — F41.1 GENERALIZED ANXIETY DISORDER WITH PANIC ATTACKS: ICD-10-CM

## 2021-09-01 DIAGNOSIS — Z13.21 ENCOUNTER FOR VITAMIN DEFICIENCY SCREENING: ICD-10-CM

## 2021-09-01 DIAGNOSIS — M62.838 NECK MUSCLE SPASM: ICD-10-CM

## 2021-09-01 DIAGNOSIS — Z13.29 THYROID DISORDER SCREEN: ICD-10-CM

## 2021-09-01 DIAGNOSIS — Z79.899 CHRONIC USE OF BENZODIAZEPINE FOR THERAPEUTIC PURPOSE: ICD-10-CM

## 2021-09-01 DIAGNOSIS — F41.0 GENERALIZED ANXIETY DISORDER WITH PANIC ATTACKS: ICD-10-CM

## 2021-09-01 PROCEDURE — 99214 OFFICE O/P EST MOD 30 MIN: CPT | Mod: 95 | Performed by: FAMILY MEDICINE

## 2021-09-01 RX ORDER — ESTRADIOL 2 MG/1
TABLET ORAL
COMMUNITY
Start: 2021-08-18 | End: 2022-03-28

## 2021-09-01 RX ORDER — ALPRAZOLAM 1 MG/1
1 TABLET ORAL
Qty: 30 TABLET | Refills: 0 | Status: SHIPPED | OUTPATIENT
Start: 2021-09-01 | End: 2021-11-30

## 2021-09-01 RX ORDER — ASPIRIN 81 MG/1
81 TABLET, CHEWABLE ORAL DAILY
Status: ON HOLD | COMMUNITY
End: 2022-05-26

## 2021-09-01 RX ORDER — ESTRADIOL VALERATE 40 MG/ML
INJECTION INTRAMUSCULAR
COMMUNITY
Start: 2021-07-09 | End: 2022-03-28

## 2021-09-01 RX ORDER — DM/P-EPHED/ACETAMINOPH/DOXYLAM
LIQUID (ML) ORAL
COMMUNITY
End: 2022-03-28

## 2021-09-01 RX ORDER — CARISOPRODOL 350 MG/1
350 TABLET ORAL
Qty: 50 TABLET | Refills: 0 | Status: SHIPPED | OUTPATIENT
Start: 2021-09-06 | End: 2021-12-05

## 2021-09-01 ASSESSMENT — FIBROSIS 4 INDEX: FIB4 SCORE: 0.29
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
## 2021-09-03 ENCOUNTER — HOSPITAL ENCOUNTER (OUTPATIENT)
Dept: LAB | Facility: MEDICAL CENTER | Age: 36
End: 2021-09-03
Attending: FAMILY MEDICINE
Payer: COMMERCIAL

## 2021-09-03 DIAGNOSIS — Z13.1 SCREENING FOR DIABETES MELLITUS: ICD-10-CM

## 2021-09-03 DIAGNOSIS — F41.0 GENERALIZED ANXIETY DISORDER WITH PANIC ATTACKS: ICD-10-CM

## 2021-09-03 DIAGNOSIS — Z13.6 SCREENING FOR CARDIOVASCULAR CONDITION: ICD-10-CM

## 2021-09-03 DIAGNOSIS — F41.1 GENERALIZED ANXIETY DISORDER WITH PANIC ATTACKS: ICD-10-CM

## 2021-09-03 DIAGNOSIS — Z13.29 THYROID DISORDER SCREEN: ICD-10-CM

## 2021-09-03 DIAGNOSIS — Z13.21 ENCOUNTER FOR VITAMIN DEFICIENCY SCREENING: ICD-10-CM

## 2021-09-03 LAB
25(OH)D3 SERPL-MCNC: 60 NG/ML (ref 30–100)
ALBUMIN SERPL BCP-MCNC: 4.5 G/DL (ref 3.2–4.9)
ALBUMIN/GLOB SERPL: 2.4 G/DL
ALP SERPL-CCNC: 38 U/L (ref 30–99)
ALT SERPL-CCNC: 20 U/L (ref 2–50)
ANION GAP SERPL CALC-SCNC: 10 MMOL/L (ref 7–16)
AST SERPL-CCNC: 17 U/L (ref 12–45)
BILIRUB SERPL-MCNC: 0.4 MG/DL (ref 0.1–1.5)
BUN SERPL-MCNC: 12 MG/DL (ref 8–22)
CALCIUM SERPL-MCNC: 9.3 MG/DL (ref 8.5–10.5)
CHLORIDE SERPL-SCNC: 102 MMOL/L (ref 96–112)
CHOLEST SERPL-MCNC: 184 MG/DL (ref 100–199)
CO2 SERPL-SCNC: 25 MMOL/L (ref 20–33)
CREAT SERPL-MCNC: 0.79 MG/DL (ref 0.5–1.4)
FASTING STATUS PATIENT QL REPORTED: NORMAL
GLOBULIN SER CALC-MCNC: 1.9 G/DL (ref 1.9–3.5)
GLUCOSE SERPL-MCNC: 82 MG/DL (ref 65–99)
HDLC SERPL-MCNC: 85 MG/DL
LDLC SERPL CALC-MCNC: 88 MG/DL
POTASSIUM SERPL-SCNC: 4.5 MMOL/L (ref 3.6–5.5)
PROT SERPL-MCNC: 6.4 G/DL (ref 6–8.2)
SODIUM SERPL-SCNC: 137 MMOL/L (ref 135–145)
TRIGL SERPL-MCNC: 55 MG/DL (ref 0–149)
TSH SERPL DL<=0.005 MIU/L-ACNC: 2.33 UIU/ML (ref 0.38–5.33)

## 2021-09-03 PROCEDURE — 83036 HEMOGLOBIN GLYCOSYLATED A1C: CPT

## 2021-09-03 PROCEDURE — 80053 COMPREHEN METABOLIC PANEL: CPT

## 2021-09-03 PROCEDURE — 80061 LIPID PANEL: CPT

## 2021-09-03 PROCEDURE — 84443 ASSAY THYROID STIM HORMONE: CPT

## 2021-09-03 PROCEDURE — 85025 COMPLETE CBC W/AUTO DIFF WBC: CPT

## 2021-09-03 PROCEDURE — 82306 VITAMIN D 25 HYDROXY: CPT

## 2021-09-03 PROCEDURE — 36415 COLL VENOUS BLD VENIPUNCTURE: CPT

## 2021-09-04 LAB
BASOPHILS # BLD AUTO: 1.2 % (ref 0–1.8)
BASOPHILS # BLD: 0.1 K/UL (ref 0–0.12)
EOSINOPHIL # BLD AUTO: 0.39 K/UL (ref 0–0.51)
EOSINOPHIL NFR BLD: 4.7 % (ref 0–6.9)
ERYTHROCYTE [DISTWIDTH] IN BLOOD BY AUTOMATED COUNT: 44.7 FL (ref 35.9–50)
EST. AVERAGE GLUCOSE BLD GHB EST-MCNC: 85 MG/DL
HBA1C MFR BLD: 4.6 % (ref 4–5.6)
HCT VFR BLD AUTO: 43.4 % (ref 37–47)
HGB BLD-MCNC: 14.2 G/DL (ref 12–16)
IMM GRANULOCYTES # BLD AUTO: 0.03 K/UL (ref 0–0.11)
IMM GRANULOCYTES NFR BLD AUTO: 0.4 % (ref 0–0.9)
LYMPHOCYTES # BLD AUTO: 2.12 K/UL (ref 1–4.8)
LYMPHOCYTES NFR BLD: 25.7 % (ref 22–41)
MCH RBC QN AUTO: 30.5 PG (ref 27–33)
MCHC RBC AUTO-ENTMCNC: 32.7 G/DL (ref 33.6–35)
MCV RBC AUTO: 93.1 FL (ref 81.4–97.8)
MONOCYTES # BLD AUTO: 0.62 K/UL (ref 0–0.85)
MONOCYTES NFR BLD AUTO: 7.5 % (ref 0–13.4)
NEUTROPHILS # BLD AUTO: 4.99 K/UL (ref 2–7.15)
NEUTROPHILS NFR BLD: 60.5 % (ref 44–72)
NRBC # BLD AUTO: 0 K/UL
NRBC BLD-RTO: 0 /100 WBC
PLATELET # BLD AUTO: 286 K/UL (ref 164–446)
PMV BLD AUTO: 10.4 FL (ref 9–12.9)
RBC # BLD AUTO: 4.66 M/UL (ref 4.2–5.4)
WBC # BLD AUTO: 8.3 K/UL (ref 4.8–10.8)

## 2021-09-13 ENCOUNTER — HOSPITAL ENCOUNTER (OUTPATIENT)
Dept: LAB | Facility: MEDICAL CENTER | Age: 36
End: 2021-09-13
Attending: OBSTETRICS & GYNECOLOGY
Payer: COMMERCIAL

## 2021-09-13 LAB
ESTRADIOL SERPL-MCNC: 165 PG/ML
PROGEST SERPL-MCNC: 29.9 NG/ML

## 2021-09-13 PROCEDURE — 36415 COLL VENOUS BLD VENIPUNCTURE: CPT

## 2021-09-13 PROCEDURE — 84144 ASSAY OF PROGESTERONE: CPT

## 2021-09-13 PROCEDURE — 82670 ASSAY OF TOTAL ESTRADIOL: CPT

## 2022-03-28 ENCOUNTER — OFFICE VISIT (OUTPATIENT)
Dept: MEDICAL GROUP | Facility: LAB | Age: 37
End: 2022-03-28
Payer: COMMERCIAL

## 2022-03-28 VITALS
RESPIRATION RATE: 14 BRPM | SYSTOLIC BLOOD PRESSURE: 116 MMHG | DIASTOLIC BLOOD PRESSURE: 68 MMHG | BODY MASS INDEX: 30.45 KG/M2 | HEART RATE: 80 BPM | WEIGHT: 194 LBS | OXYGEN SATURATION: 98 % | HEIGHT: 67 IN | TEMPERATURE: 97.5 F

## 2022-03-28 DIAGNOSIS — J06.9 UPPER RESPIRATORY TRACT INFECTION, UNSPECIFIED TYPE: ICD-10-CM

## 2022-03-28 PROCEDURE — 99213 OFFICE O/P EST LOW 20 MIN: CPT | Performed by: NURSE PRACTITIONER

## 2022-03-28 ASSESSMENT — FIBROSIS 4 INDEX: FIB4 SCORE: 0.49

## 2022-03-28 NOTE — PROGRESS NOTES
"Subjective:     CC:   Chief Complaint   Patient presents with   • Cold Symptoms     Congestion , sinus , sore throat , green / yellow mucus , cough , pain both ears      HPI:   Carlotta presents today with the following:    URI  Onset 1 week ago. Congestion, sinus pressure, headache, hoarseness, cough, chest congestion, runny nose, ear fullness.   Denies fever, chills, myalgias, shortness of breath.   Patient is 8 months pregnant.   She has not been taking any OTC cold medication. She has been taking Benadryl at night.    Home covid test was negative.     ROS:   Gen: no fevers/chills, no changes in weight  Eyes: no changes in vision  ENT: no sore throat, no hearing loss, no bloody nose  Pulm: no sob, no cough  CV: no chest pain, no palpitations  GI: no nausea/vomiting, no diarrhea  : no dysuria  MSk: no myalgias  Skin: no rash  Neuro: no headaches, no numbness/tingling  Heme/Lymph: no easy bruising        - NOTE: All other systems reviewed and are negative, except as in HPI.    Objective:     Exam: /68 (BP Location: Right arm, Patient Position: Sitting, BP Cuff Size: Adult)   Pulse 80   Temp 36.4 °C (97.5 °F)   Resp 14   Ht 1.702 m (5' 7\")   Wt 88 kg (194 lb)   SpO2 98%  Body mass index is 30.38 kg/m².    General: Normal appearing. No distress.  HEENT: Normocephalic. Eyes conjunctiva clear lids without ptosis, pupils equal and reactive to light accommodation, ears normal shape and contour, canals are clear bilaterally, tympanic membranes are benign, nasal mucosa benign, oropharynx is without erythema, edema or exudates.   Neck: Supple without JVD or bruit. Thyroid is not enlarged.  Pulmonary: Clear to ausculation.  Normal effort. No rales, ronchi, or wheezing.  Cardiovascular: Regular rate and rhythm without murmur. Carotid and radial pulses are intact and equal bilaterally.  Neurologic: Grossly nonfocal  Lymph: No cervical or supraclavicular lymph nodes are palpable  Skin: Warm and dry.  No obvious " lesions.  Musculoskeletal: Normal gait. No extremity cyanosis, clubbing, or edema.  Psych: Normal mood and affect. Alert and oriented x3. Judgment and insight is normal.    Assessment & Plan:     37 y.o. female with the following -     1. Upper respiratory tract infection, unspecified type  Discussed OTC medications that are safe to use during pregnancy. Patient can use a cool mist humidifier at home. Recommend using nasal saline wash (i.e. Nedi-Pot), over-the-counter decongestants as needed. Discussed antibiotics if symptoms worsen or linger. Tylenol as needed for headache or discomfort. Supportive care, differential diagnoses, and indications for immediate follow-up discussed with patient. Pathogenesis of diagnosis discussed including typical length and natural progression. Instructed to return to clinic for any change in condition, further concerns, or worsening of symptoms.

## 2022-03-28 NOTE — PATIENT INSTRUCTIONS
Medicines During Pregnancy  During pregnancy, there are medicines that are either safe or unsafe to take. Medicines include prescriptions from your caregiver, over-the-counter medicines, topical creams applied to the skin, and all herbal substances. Medicines are put into either Class A, B, C, or D. Class A and B medicines have been shown to be safe in pregnancy. Class C medicines are also considered to be safe in pregnancy, but these medicines should only be used when necessary. Class D medicines should not be used at all in pregnancy. They can be harmful to a baby.   It is best to take as little medicine as possible while pregnant. However, some medicines are necessary to take for the mother and baby's health. Sometimes, it is more dangerous to stop taking certain medicines than to stay on them. This is often the case for people with long-term (chronic) conditions such as asthma, diabetes, or high blood pressure (hypertension). If you are pregnant and have a chronic illness, call your caregiver right away. Bring a list of your medicines and their doses to your appointments. If you are planning to become pregnant, schedule a doctor's appointment and discuss your medicines with your caregiver.  Lastly, write down the phone number to your pharmacist. They can answer questions regarding a medicine's class and safety. They cannot give advice as to whether you should or should not be on a medicine.   SAFE AND UNSAFE MEDICINES  There is a long list of medicines that are considered safe in pregnancy. Below is a shorter list. For specific medicines, ask your caregiver.   Allergy Medicines  Loratadine, cetirizine, and chlorpheniramine are safe to take. Certain nasal steroid sprays are safe. Talk to your caregiver about specific brands that are safe.  Analgesics  Acetaminophen and acetaminophen with codeine are safe to take. All other nonsteroidal anti-inflammatory drugs (NSAIDS) are not safe. This includes ibuprofen.    Antacids   Many over-the-counter antacids are safe to take. Talk to your caregiver about specific brands that are safe. Famotidine, ranitidine, and lansoprazole are safe. Omepresole is considered safe to take in the second trimester.  Antibiotic Medicines   There are several antibiotics to avoid. These include, but are not limited to, tetracyline, quinolones, and sulfa medications. Talk to your caregiver before taking any antibiotic.   Antihistamines   Talk to your caregiver about specific brands that are safe.   Asthma Medicines   Most asthma steroid inhalers are safe to take. Talk to your caregiver for specific details.  Calcium   Calcium supplements are safe to take. Do not take oyster shell calcium.   Cough and Cold Medicines   It is safe to take products with guaifenesin or dextromethorphan. Talk to your caregiver about specific brands that are safe. It is not safe to take products that contain aspirin or ibuprofen.  Decongestant Medicines  Pseudoephedrine-containing products are safe to take in the second and third trimester.   Depression Medicines   Talk about these medicines with your caregiver.   Antidiarrheal Medicines   It is safe to take loperamide. Talk to your caregiver about specific brands that are safe. It is not safe to take any antidiarrheal medicine that contains bismuth.  Eyedrops   Allergy eyedrops should be limited.   Iron   It is safe to use certain iron-containing medicines for anemia in pregnancy. They require a prescription.   Antinausea Medicines   It is safe to take doxylamine and vitamin B6 as directed. There are other prescription medicines available, if needed.   Sleep aids   It is safe to take diphenhydramine and acetaminophen with diphenhydramine.   Steroids   Hydrocortisone creams are safe to use as directed. Oral steroids require a prescription. It is not safe to take any hemorrhoid cream with pramoxine or phenylephrine.  Stool softener   It is safe to take stool softener  medicines. Avoid daily or prolonged use of stool softeners.  Thyroid Medicine   It is important to stay on this thyroid medicine. It needs to be followed by your caregiver.   Vaginal Medicines   Your caregiver will prescribe a medicine to you if you have a vaginal infection. Certain antifungal medicines are safe to use if you have a sexually transmitted infection (STI). Talk to your caregiver.   Document Released: 12/18/2006 Document Revised: 03/11/2013 Document Reviewed: 12/18/2012  [employer]® Patient Information ©2014 [employer].

## 2022-05-22 ENCOUNTER — HOSPITAL ENCOUNTER (INPATIENT)
Facility: MEDICAL CENTER | Age: 37
LOS: 4 days | End: 2022-05-26
Attending: OBSTETRICS & GYNECOLOGY | Admitting: OBSTETRICS & GYNECOLOGY
Payer: COMMERCIAL

## 2022-05-22 ENCOUNTER — APPOINTMENT (OUTPATIENT)
Dept: OBGYN | Facility: MEDICAL CENTER | Age: 37
End: 2022-05-22
Attending: OBSTETRICS & GYNECOLOGY
Payer: COMMERCIAL

## 2022-05-22 DIAGNOSIS — G89.18 ACUTE POST-OPERATIVE PAIN: ICD-10-CM

## 2022-05-22 LAB
BASOPHILS # BLD AUTO: 0.6 % (ref 0–1.8)
BASOPHILS # BLD: 0.08 K/UL (ref 0–0.12)
EOSINOPHIL # BLD AUTO: 0.19 K/UL (ref 0–0.51)
EOSINOPHIL NFR BLD: 1.5 % (ref 0–6.9)
ERYTHROCYTE [DISTWIDTH] IN BLOOD BY AUTOMATED COUNT: 42.5 FL (ref 35.9–50)
HCT VFR BLD AUTO: 38.9 % (ref 37–47)
HGB BLD-MCNC: 13.4 G/DL (ref 12–16)
HOLDING TUBE BB 8507: NORMAL
IMM GRANULOCYTES # BLD AUTO: 0.06 K/UL (ref 0–0.11)
IMM GRANULOCYTES NFR BLD AUTO: 0.5 % (ref 0–0.9)
LYMPHOCYTES # BLD AUTO: 2.56 K/UL (ref 1–4.8)
LYMPHOCYTES NFR BLD: 20.8 % (ref 22–41)
MCH RBC QN AUTO: 30.6 PG (ref 27–33)
MCHC RBC AUTO-ENTMCNC: 34.4 G/DL (ref 33.6–35)
MCV RBC AUTO: 88.8 FL (ref 81.4–97.8)
MONOCYTES # BLD AUTO: 0.99 K/UL (ref 0–0.85)
MONOCYTES NFR BLD AUTO: 8 % (ref 0–13.4)
NEUTROPHILS # BLD AUTO: 8.43 K/UL (ref 2–7.15)
NEUTROPHILS NFR BLD: 68.6 % (ref 44–72)
NRBC # BLD AUTO: 0 K/UL
NRBC BLD-RTO: 0 /100 WBC
PLATELET # BLD AUTO: 221 K/UL (ref 164–446)
PMV BLD AUTO: 11.1 FL (ref 9–12.9)
RBC # BLD AUTO: 4.38 M/UL (ref 4.2–5.4)
WBC # BLD AUTO: 12.3 K/UL (ref 4.8–10.8)

## 2022-05-22 PROCEDURE — 85025 COMPLETE CBC W/AUTO DIFF WBC: CPT

## 2022-05-22 PROCEDURE — 36415 COLL VENOUS BLD VENIPUNCTURE: CPT

## 2022-05-22 PROCEDURE — 700102 HCHG RX REV CODE 250 W/ 637 OVERRIDE(OP): Performed by: OBSTETRICS & GYNECOLOGY

## 2022-05-22 PROCEDURE — 770002 HCHG ROOM/CARE - OB PRIVATE (112)

## 2022-05-22 PROCEDURE — A9270 NON-COVERED ITEM OR SERVICE: HCPCS | Performed by: OBSTETRICS & GYNECOLOGY

## 2022-05-22 RX ORDER — IBUPROFEN 800 MG/1
800 TABLET ORAL
Status: DISCONTINUED | OUTPATIENT
Start: 2022-05-22 | End: 2022-05-24 | Stop reason: HOSPADM

## 2022-05-22 RX ORDER — ACETAMINOPHEN 500 MG
1000 TABLET ORAL
Status: DISCONTINUED | OUTPATIENT
Start: 2022-05-22 | End: 2022-05-24 | Stop reason: HOSPADM

## 2022-05-22 RX ORDER — MISOPROSTOL 200 UG/1
800 TABLET ORAL
Status: DISCONTINUED | OUTPATIENT
Start: 2022-05-22 | End: 2022-05-24 | Stop reason: HOSPADM

## 2022-05-22 RX ORDER — TERBUTALINE SULFATE 1 MG/ML
0.25 INJECTION, SOLUTION SUBCUTANEOUS
Status: DISCONTINUED | OUTPATIENT
Start: 2022-05-22 | End: 2022-05-24 | Stop reason: HOSPADM

## 2022-05-22 RX ORDER — DEXTROSE, SODIUM CHLORIDE, SODIUM LACTATE, POTASSIUM CHLORIDE, AND CALCIUM CHLORIDE 5; .6; .31; .03; .02 G/100ML; G/100ML; G/100ML; G/100ML; G/100ML
INJECTION, SOLUTION INTRAVENOUS CONTINUOUS
Status: DISCONTINUED | OUTPATIENT
Start: 2022-05-23 | End: 2022-05-26 | Stop reason: HOSPADM

## 2022-05-22 RX ORDER — SODIUM CHLORIDE, SODIUM LACTATE, POTASSIUM CHLORIDE, CALCIUM CHLORIDE 600; 310; 30; 20 MG/100ML; MG/100ML; MG/100ML; MG/100ML
1000 INJECTION, SOLUTION INTRAVENOUS CONTINUOUS
Status: ACTIVE | OUTPATIENT
Start: 2022-05-22 | End: 2022-05-23

## 2022-05-22 RX ORDER — ONDANSETRON 4 MG/1
4 TABLET, ORALLY DISINTEGRATING ORAL EVERY 6 HOURS PRN
Status: DISCONTINUED | OUTPATIENT
Start: 2022-05-22 | End: 2022-05-24 | Stop reason: HOSPADM

## 2022-05-22 RX ORDER — ONDANSETRON 2 MG/ML
4 INJECTION INTRAMUSCULAR; INTRAVENOUS EVERY 6 HOURS PRN
Status: DISCONTINUED | OUTPATIENT
Start: 2022-05-22 | End: 2022-05-24 | Stop reason: HOSPADM

## 2022-05-22 RX ORDER — OXYTOCIN 10 [USP'U]/ML
10 INJECTION, SOLUTION INTRAMUSCULAR; INTRAVENOUS
Status: DISCONTINUED | OUTPATIENT
Start: 2022-05-22 | End: 2022-05-24 | Stop reason: HOSPADM

## 2022-05-22 RX ADMIN — MISOPROSTOL 25 MCG: 100 TABLET ORAL at 21:55

## 2022-05-22 ASSESSMENT — PAIN DESCRIPTION - PAIN TYPE: TYPE: ACUTE PAIN

## 2022-05-22 ASSESSMENT — FIBROSIS 4 INDEX: FIB4 SCORE: 0.49

## 2022-05-23 ENCOUNTER — ANESTHESIA (OUTPATIENT)
Dept: OBGYN | Facility: MEDICAL CENTER | Age: 37
End: 2022-05-23
Payer: COMMERCIAL

## 2022-05-23 ENCOUNTER — ANESTHESIA EVENT (OUTPATIENT)
Dept: OBGYN | Facility: MEDICAL CENTER | Age: 37
End: 2022-05-23
Payer: COMMERCIAL

## 2022-05-23 PROCEDURE — 770002 HCHG ROOM/CARE - OB PRIVATE (112)

## 2022-05-23 PROCEDURE — 700111 HCHG RX REV CODE 636 W/ 250 OVERRIDE (IP): Performed by: OBSTETRICS & GYNECOLOGY

## 2022-05-23 PROCEDURE — 01967 NEURAXL LBR ANES VAG DLVR: CPT | Performed by: ANESTHESIOLOGY

## 2022-05-23 PROCEDURE — 700111 HCHG RX REV CODE 636 W/ 250 OVERRIDE (IP)

## 2022-05-23 PROCEDURE — 700111 HCHG RX REV CODE 636 W/ 250 OVERRIDE (IP): Performed by: ANESTHESIOLOGY

## 2022-05-23 PROCEDURE — A9270 NON-COVERED ITEM OR SERVICE: HCPCS | Performed by: OBSTETRICS & GYNECOLOGY

## 2022-05-23 PROCEDURE — 700102 HCHG RX REV CODE 250 W/ 637 OVERRIDE(OP): Performed by: OBSTETRICS & GYNECOLOGY

## 2022-05-23 PROCEDURE — 303615 HCHG EPIDURAL/SPINAL ANESTHESIA FOR LABOR

## 2022-05-23 PROCEDURE — 700105 HCHG RX REV CODE 258: Performed by: OBSTETRICS & GYNECOLOGY

## 2022-05-23 PROCEDURE — 700105 HCHG RX REV CODE 258: Performed by: ANESTHESIOLOGY

## 2022-05-23 RX ORDER — SODIUM CHLORIDE, SODIUM LACTATE, POTASSIUM CHLORIDE, CALCIUM CHLORIDE 600; 310; 30; 20 MG/100ML; MG/100ML; MG/100ML; MG/100ML
INJECTION, SOLUTION INTRAVENOUS CONTINUOUS
Status: DISCONTINUED | OUTPATIENT
Start: 2022-05-23 | End: 2022-05-26 | Stop reason: HOSPADM

## 2022-05-23 RX ORDER — ROPIVACAINE HYDROCHLORIDE 2 MG/ML
INJECTION, SOLUTION EPIDURAL; INFILTRATION; PERINEURAL
Status: COMPLETED
Start: 2022-05-23 | End: 2022-05-23

## 2022-05-23 RX ORDER — BUPIVACAINE HYDROCHLORIDE 2.5 MG/ML
INJECTION, SOLUTION EPIDURAL; INFILTRATION; INTRACAUDAL
Status: COMPLETED | OUTPATIENT
Start: 2022-05-23 | End: 2022-05-23

## 2022-05-23 RX ORDER — SODIUM CHLORIDE, SODIUM LACTATE, POTASSIUM CHLORIDE, AND CALCIUM CHLORIDE .6; .31; .03; .02 G/100ML; G/100ML; G/100ML; G/100ML
1000 INJECTION, SOLUTION INTRAVENOUS
Status: DISCONTINUED | OUTPATIENT
Start: 2022-05-23 | End: 2022-05-24 | Stop reason: HOSPADM

## 2022-05-23 RX ORDER — SODIUM CHLORIDE, SODIUM LACTATE, POTASSIUM CHLORIDE, AND CALCIUM CHLORIDE .6; .31; .03; .02 G/100ML; G/100ML; G/100ML; G/100ML
250 INJECTION, SOLUTION INTRAVENOUS PRN
Status: DISCONTINUED | OUTPATIENT
Start: 2022-05-23 | End: 2022-05-24 | Stop reason: HOSPADM

## 2022-05-23 RX ORDER — BUPIVACAINE HYDROCHLORIDE 2.5 MG/ML
INJECTION, SOLUTION EPIDURAL; INFILTRATION; INTRACAUDAL
Status: COMPLETED
Start: 2022-05-23 | End: 2022-05-23

## 2022-05-23 RX ORDER — ROPIVACAINE HYDROCHLORIDE 2 MG/ML
INJECTION, SOLUTION EPIDURAL; INFILTRATION; PERINEURAL CONTINUOUS
Status: DISCONTINUED | OUTPATIENT
Start: 2022-05-23 | End: 2022-05-26 | Stop reason: HOSPADM

## 2022-05-23 RX ADMIN — ONDANSETRON HYDROCHLORIDE 4 MG: 2 SOLUTION INTRAMUSCULAR; INTRAVENOUS at 23:25

## 2022-05-23 RX ADMIN — SODIUM CHLORIDE, SODIUM GLUCONATE, SODIUM ACETATE, POTASSIUM CHLORIDE AND MAGNESIUM CHLORIDE 500 ML: 526; 502; 368; 37; 30 INJECTION, SOLUTION INTRAVENOUS at 18:23

## 2022-05-23 RX ADMIN — FENTANYL CITRATE 100 MCG: 50 INJECTION, SOLUTION INTRAMUSCULAR; INTRAVENOUS at 16:44

## 2022-05-23 RX ADMIN — SODIUM CHLORIDE, POTASSIUM CHLORIDE, SODIUM LACTATE AND CALCIUM CHLORIDE: 600; 310; 30; 20 INJECTION, SOLUTION INTRAVENOUS at 17:37

## 2022-05-23 RX ADMIN — ROPIVACAINE HYDROCHLORIDE: 2 INJECTION, SOLUTION EPIDURAL; INFILTRATION at 18:45

## 2022-05-23 RX ADMIN — MISOPROSTOL 25 MCG: 100 TABLET ORAL at 08:31

## 2022-05-23 RX ADMIN — ONDANSETRON HYDROCHLORIDE 4 MG: 2 SOLUTION INTRAMUSCULAR; INTRAVENOUS at 08:27

## 2022-05-23 RX ADMIN — MISOPROSTOL 25 MCG: 100 TABLET ORAL at 03:28

## 2022-05-23 RX ADMIN — BUPIVACAINE HYDROCHLORIDE 5 ML: 2.5 INJECTION, SOLUTION EPIDURAL; INFILTRATION; INTRACAUDAL; PERINEURAL at 18:24

## 2022-05-23 RX ADMIN — SODIUM CHLORIDE, SODIUM LACTATE, POTASSIUM CHLORIDE, CALCIUM CHLORIDE AND DEXTROSE MONOHYDRATE: 5; 600; 310; 30; 20 INJECTION, SOLUTION INTRAVENOUS at 20:49

## 2022-05-23 RX ADMIN — FENTANYL CITRATE 100 MCG: 50 INJECTION, SOLUTION INTRAMUSCULAR; INTRAVENOUS at 18:24

## 2022-05-23 RX ADMIN — SODIUM CHLORIDE, POTASSIUM CHLORIDE, SODIUM LACTATE AND CALCIUM CHLORIDE: 600; 310; 30; 20 INJECTION, SOLUTION INTRAVENOUS at 14:32

## 2022-05-23 RX ADMIN — OXYTOCIN 2 MILLI-UNITS/MIN: 10 INJECTION, SOLUTION INTRAMUSCULAR; INTRAVENOUS at 14:30

## 2022-05-23 RX ADMIN — ONDANSETRON HYDROCHLORIDE 4 MG: 2 SOLUTION INTRAMUSCULAR; INTRAVENOUS at 14:43

## 2022-05-23 RX ADMIN — FENTANYL CITRATE 100 MCG: 50 INJECTION, SOLUTION INTRAMUSCULAR; INTRAVENOUS at 17:58

## 2022-05-23 ASSESSMENT — PAIN DESCRIPTION - PAIN TYPE
TYPE: ACUTE PAIN

## 2022-05-23 ASSESSMENT — PATIENT HEALTH QUESTIONNAIRE - PHQ9
1. LITTLE INTEREST OR PLEASURE IN DOING THINGS: NOT AT ALL
2. FEELING DOWN, DEPRESSED, IRRITABLE, OR HOPELESS: NOT AT ALL
SUM OF ALL RESPONSES TO PHQ9 QUESTIONS 1 AND 2: 0
SUM OF ALL RESPONSES TO PHQ9 QUESTIONS 1 AND 2: 0
1. LITTLE INTEREST OR PLEASURE IN DOING THINGS: NOT AT ALL
2. FEELING DOWN, DEPRESSED, IRRITABLE, OR HOPELESS: NOT AT ALL

## 2022-05-23 NOTE — PROGRESS NOTES
Patient is a  at 39weeks and 2days, EDC of  arrived to unit for scheduled iol for 2vc and ama. Patient and significant other placed in S217, RN placed EFM and toco to ensure fetal well being and monitor uterine activity. RN educated patient on need for monitors and patient verbalized understanding. Vital signs taken and within normal limits. Prenatal labs / records reviewed by RN. Patient denies leaking or vaginal bleeding, reports positive fetal movement.   2042 1/60/-3  2105 Dr. Mcduffie notified of no orders in chart, routine admission orders received, Cytotec ordered. Admission profile completed; IV started; labs drawn and sent; vital signs stable; patient educated on how to use call light system; call light and patient belongings in reach; patient encouraged to call RN for any needs, wants, desires or concerns.    Cytotec placed patient tolerated well.  328 Cytotec placed patient tolerated well.  658 Dr Orellana notified of patient status, orders received for regular diet for breakfast, MD will be by in later today to see patient.   0700 bedside report given to Estella CREWS, assumed care, POC discussed, all questions answered; vital signs stable.

## 2022-05-23 NOTE — PROGRESS NOTES
Feeling a lot more uncomfortable in the last hour, wants to move towards epidural    AFVSS  SVE 4/100/-2, IUPC placed  EFM- cat 1  [place]- q2-3  Pit at 4mu    36yo  at 39w3d  Making progress  Will move toward epidural

## 2022-05-23 NOTE — CARE PLAN
The patient is Watcher - Medium risk of patient condition declining or worsening  Problem: Knowledge Deficit - L&D  Goal: Patient and family/caregivers will demonstrate understanding of plan of care, disease process/condition, diagnostic tests and medications  Note: RN updated patient on POC, patient encouraged to ask RN questions, all questions answered, patient verbalized understanding.     Problem: Risk for Injury  Goal: Patient and fetus will be free of preventable injury/complications  Note: RN placed EFM and toco to ensure fetal well being and monitor uterine activity. RN educated patient on need for monitors and patient verbalized understanding.

## 2022-05-23 NOTE — H&P
DATE OF ADMISSION:  2022     IDENTIFICATION:  This is a 37-year-old  4, para 0 female whose EDC is   2022, which makes her 39 weeks and 3 days.  She is being admitted for   induction of labor for single umbilical artery.     HISTORY OF PRESENT ILLNESS:  The patient has been followed by myself.  This   pregnancy is a result of in vitro fertilization.  She had negative prenatal   genetic testing.  We are expecting a boy.  Maternity 21 was also done and   returned low risk.  She had a normal anatomy scan with a posterior placenta,   but was also noted to have a single umbilical artery in the umbilical cord.    Growth scans have been followed throughout her pregnancy, they have been   normal.  Her glucose came back at 121.  Her GBS is negative.  She was admitted   yesterday evening for cervical ripening.  She has received 3 doses of   Cytotec.  She is feeling some of her contractions, but is overall still   comfortable.  She is planning on an epidural for pain control.     PAST MEDICAL HISTORY:  History of high blood pressure and elevated   cholesterol, infertility.     PAST SURGICAL HISTORY:  Appendectomy, breast augmentation, cholecystectomy, D   and C x2, IVF, gallbladder surgery and hysteroscopy.     SOCIAL HISTORY:  She is  to her , Demetrius.  She works for ____.    She denies use of tobacco, alcohol or drugs.     GYNECOLOGIC HISTORY:  She is a .  She has had 3 previous miscarriages.     PHYSICAL EXAMINATION:    VITAL SIGNS:  Her blood pressures are normotensive.  She is afebrile.  GENERAL:  She is quite , up and about in her room, no apparent   distress.  HEART:  Regular rate and rhythm.  LUNGS:  Clear to auscultation bilaterally.  ABDOMEN:  Soft, gravid, Leopold's about 6-7 pounds.  PELVIC:  Sterile vaginal exam, she is 2, 70, -2. Head is well applied to the   cervix.  Artificial rupture of membranes was performed and clear fluid was   noted.  Fetal heart tracing is  reviewed and is category 1.  Tocometry reveals   contractions every 2-5 minutes.     ASSESSMENT AND PLAN:  A 37-year-old  at 39 weeks and 3 days, undergoing   induction of labor for single umbilical artery and IVF pregnancy.  She is   status post 3 doses of Cytotec now. Her cervix is more favorable.  I just   ruptured her membranes and the fluid was clear.  She will be started on   Pitocin.  We will follow her along in labor and we hope for vaginal delivery.    The patient's questions have been answered.        ______________________________  MD DOMINIQUE Mathews/JADON/[facility] [place]    DD:  2022 13:04  DT:  2022 14:23    Job#:  [id number]

## 2022-05-24 LAB — RUBV AB SER QL: 58.4 IU/ML

## 2022-05-24 PROCEDURE — 700111 HCHG RX REV CODE 636 W/ 250 OVERRIDE (IP): Performed by: ANESTHESIOLOGY

## 2022-05-24 PROCEDURE — 01968 ANES/ANALG CS DLVR NEURAXIAL: CPT | Performed by: ANESTHESIOLOGY

## 2022-05-24 PROCEDURE — 700105 HCHG RX REV CODE 258: Performed by: ANESTHESIOLOGY

## 2022-05-24 PROCEDURE — 700101 HCHG RX REV CODE 250: Performed by: ANESTHESIOLOGY

## 2022-05-24 PROCEDURE — A9270 NON-COVERED ITEM OR SERVICE: HCPCS | Performed by: OBSTETRICS & GYNECOLOGY

## 2022-05-24 PROCEDURE — 59514 CESAREAN DELIVERY ONLY: CPT | Mod: 80 | Performed by: OBSTETRICS & GYNECOLOGY

## 2022-05-24 PROCEDURE — 160002 HCHG RECOVERY MINUTES (STAT): Performed by: OBSTETRICS & GYNECOLOGY

## 2022-05-24 PROCEDURE — 160009 HCHG ANES TIME/MIN: Performed by: OBSTETRICS & GYNECOLOGY

## 2022-05-24 PROCEDURE — 10H07YZ INSERTION OF OTHER DEVICE INTO PRODUCTS OF CONCEPTION, VIA NATURAL OR ARTIFICIAL OPENING: ICD-10-PCS | Performed by: OBSTETRICS & GYNECOLOGY

## 2022-05-24 PROCEDURE — 700111 HCHG RX REV CODE 636 W/ 250 OVERRIDE (IP): Performed by: OBSTETRICS & GYNECOLOGY

## 2022-05-24 PROCEDURE — 160048 HCHG OR STATISTICAL LEVEL 1-5: Performed by: OBSTETRICS & GYNECOLOGY

## 2022-05-24 PROCEDURE — 770002 HCHG ROOM/CARE - OB PRIVATE (112)

## 2022-05-24 PROCEDURE — 160041 HCHG SURGERY MINUTES - EA ADDL 1 MIN LEVEL 4: Performed by: OBSTETRICS & GYNECOLOGY

## 2022-05-24 PROCEDURE — 160035 HCHG PACU - 1ST 60 MINS PHASE I: Performed by: OBSTETRICS & GYNECOLOGY

## 2022-05-24 PROCEDURE — 36415 COLL VENOUS BLD VENIPUNCTURE: CPT

## 2022-05-24 PROCEDURE — 86762 RUBELLA ANTIBODY: CPT

## 2022-05-24 PROCEDURE — 700102 HCHG RX REV CODE 250 W/ 637 OVERRIDE(OP): Performed by: OBSTETRICS & GYNECOLOGY

## 2022-05-24 PROCEDURE — 700102 HCHG RX REV CODE 250 W/ 637 OVERRIDE(OP): Performed by: ANESTHESIOLOGY

## 2022-05-24 PROCEDURE — 160029 HCHG SURGERY MINUTES - 1ST 30 MINS LEVEL 4: Performed by: OBSTETRICS & GYNECOLOGY

## 2022-05-24 PROCEDURE — A9270 NON-COVERED ITEM OR SERVICE: HCPCS | Performed by: ANESTHESIOLOGY

## 2022-05-24 RX ORDER — VITAMIN A ACETATE, BETA CAROTENE, ASCORBIC ACID, CHOLECALCIFEROL, .ALPHA.-TOCOPHEROL ACETATE, DL-, THIAMINE MONONITRATE, RIBOFLAVIN, NIACINAMIDE, PYRIDOXINE HYDROCHLORIDE, FOLIC ACID, CYANOCOBALAMIN, CALCIUM CARBONATE, FERROUS FUMARATE, ZINC OXIDE, CUPRIC OXIDE 3080; 12; 120; 400; 1; 1.84; 3; 20; 22; 920; 25; 200; 27; 10; 2 [IU]/1; UG/1; MG/1; [IU]/1; MG/1; MG/1; MG/1; MG/1; MG/1; [IU]/1; MG/1; MG/1; MG/1; MG/1; MG/1
1 TABLET, FILM COATED ORAL
Status: DISCONTINUED | OUTPATIENT
Start: 2022-05-25 | End: 2022-05-26 | Stop reason: HOSPADM

## 2022-05-24 RX ORDER — MEPERIDINE HYDROCHLORIDE 25 MG/ML
12.5 INJECTION INTRAMUSCULAR; INTRAVENOUS; SUBCUTANEOUS
Status: DISCONTINUED | OUTPATIENT
Start: 2022-05-24 | End: 2022-05-24 | Stop reason: HOSPADM

## 2022-05-24 RX ORDER — ACETAMINOPHEN 500 MG
1000 TABLET ORAL EVERY 6 HOURS PRN
Status: DISCONTINUED | OUTPATIENT
Start: 2022-05-27 | End: 2022-05-26 | Stop reason: HOSPADM

## 2022-05-24 RX ORDER — IBUPROFEN 800 MG/1
800 TABLET ORAL EVERY 8 HOURS PRN
Status: DISCONTINUED | OUTPATIENT
Start: 2022-05-28 | End: 2022-05-25

## 2022-05-24 RX ORDER — OXYCODONE HCL 5 MG/5 ML
5 SOLUTION, ORAL ORAL
Status: COMPLETED | OUTPATIENT
Start: 2022-05-24 | End: 2022-05-24

## 2022-05-24 RX ORDER — OXYTOCIN 10 [USP'U]/ML
INJECTION, SOLUTION INTRAMUSCULAR; INTRAVENOUS PRN
Status: DISCONTINUED | OUTPATIENT
Start: 2022-05-24 | End: 2022-05-24 | Stop reason: SURG

## 2022-05-24 RX ORDER — ONDANSETRON 4 MG/1
4 TABLET, ORALLY DISINTEGRATING ORAL EVERY 6 HOURS PRN
Status: DISCONTINUED | OUTPATIENT
Start: 2022-05-24 | End: 2022-05-26 | Stop reason: HOSPADM

## 2022-05-24 RX ORDER — ACETAMINOPHEN 500 MG
1000 TABLET ORAL EVERY 6 HOURS
Status: DISCONTINUED | OUTPATIENT
Start: 2022-05-24 | End: 2022-05-26 | Stop reason: HOSPADM

## 2022-05-24 RX ORDER — AZITHROMYCIN 500 MG/5ML
500 INJECTION, POWDER, LYOPHILIZED, FOR SOLUTION INTRAVENOUS EVERY 24 HOURS
Status: DISCONTINUED | OUTPATIENT
Start: 2022-05-24 | End: 2022-05-24

## 2022-05-24 RX ORDER — HYDROMORPHONE HYDROCHLORIDE 1 MG/ML
0.6 INJECTION, SOLUTION INTRAMUSCULAR; INTRAVENOUS; SUBCUTANEOUS
Status: DISCONTINUED | OUTPATIENT
Start: 2022-05-24 | End: 2022-05-24 | Stop reason: HOSPADM

## 2022-05-24 RX ORDER — KETOROLAC TROMETHAMINE 30 MG/ML
30 INJECTION, SOLUTION INTRAMUSCULAR; INTRAVENOUS EVERY 6 HOURS
Status: DISCONTINUED | OUTPATIENT
Start: 2022-05-24 | End: 2022-05-24

## 2022-05-24 RX ORDER — DIPHENHYDRAMINE HYDROCHLORIDE 50 MG/ML
25 INJECTION INTRAMUSCULAR; INTRAVENOUS EVERY 6 HOURS PRN
Status: DISCONTINUED | OUTPATIENT
Start: 2022-05-24 | End: 2022-05-26 | Stop reason: HOSPADM

## 2022-05-24 RX ORDER — IBUPROFEN 800 MG/1
800 TABLET ORAL EVERY 8 HOURS
Status: DISCONTINUED | OUTPATIENT
Start: 2022-05-25 | End: 2022-05-25

## 2022-05-24 RX ORDER — MIDAZOLAM HYDROCHLORIDE 1 MG/ML
INJECTION INTRAMUSCULAR; INTRAVENOUS PRN
Status: DISCONTINUED | OUTPATIENT
Start: 2022-05-24 | End: 2022-05-24 | Stop reason: SURG

## 2022-05-24 RX ORDER — ALBUTEROL SULFATE 2.5 MG/3ML
2.5 SOLUTION RESPIRATORY (INHALATION)
Status: DISCONTINUED | OUTPATIENT
Start: 2022-05-24 | End: 2022-05-24 | Stop reason: HOSPADM

## 2022-05-24 RX ORDER — MEPERIDINE HYDROCHLORIDE 25 MG/ML
INJECTION INTRAMUSCULAR; INTRAVENOUS; SUBCUTANEOUS PRN
Status: DISCONTINUED | OUTPATIENT
Start: 2022-05-24 | End: 2022-05-24 | Stop reason: SURG

## 2022-05-24 RX ORDER — DOCUSATE SODIUM 100 MG/1
100 CAPSULE, LIQUID FILLED ORAL 2 TIMES DAILY
Status: DISCONTINUED | OUTPATIENT
Start: 2022-05-24 | End: 2022-05-26 | Stop reason: HOSPADM

## 2022-05-24 RX ORDER — METOCLOPRAMIDE HYDROCHLORIDE 5 MG/ML
10 INJECTION INTRAMUSCULAR; INTRAVENOUS ONCE
Status: COMPLETED | OUTPATIENT
Start: 2022-05-24 | End: 2022-05-24

## 2022-05-24 RX ORDER — HYDROMORPHONE HYDROCHLORIDE 1 MG/ML
0.4 INJECTION, SOLUTION INTRAMUSCULAR; INTRAVENOUS; SUBCUTANEOUS
Status: DISCONTINUED | OUTPATIENT
Start: 2022-05-24 | End: 2022-05-24 | Stop reason: HOSPADM

## 2022-05-24 RX ORDER — BUPIVACAINE HYDROCHLORIDE 2.5 MG/ML
INJECTION, SOLUTION EPIDURAL; INFILTRATION; INTRACAUDAL
Status: ACTIVE
Start: 2022-05-24 | End: 2022-05-24

## 2022-05-24 RX ORDER — OXYCODONE HYDROCHLORIDE 10 MG/1
10 TABLET ORAL EVERY 4 HOURS PRN
Status: DISCONTINUED | OUTPATIENT
Start: 2022-05-24 | End: 2022-05-26 | Stop reason: HOSPADM

## 2022-05-24 RX ORDER — OXYCODONE HCL 5 MG/5 ML
10 SOLUTION, ORAL ORAL
Status: COMPLETED | OUTPATIENT
Start: 2022-05-24 | End: 2022-05-24

## 2022-05-24 RX ORDER — CEFAZOLIN SODIUM 1 G/3ML
INJECTION, POWDER, FOR SOLUTION INTRAMUSCULAR; INTRAVENOUS PRN
Status: DISCONTINUED | OUTPATIENT
Start: 2022-05-24 | End: 2022-05-24 | Stop reason: SURG

## 2022-05-24 RX ORDER — DIPHENHYDRAMINE HYDROCHLORIDE 50 MG/ML
12.5 INJECTION INTRAMUSCULAR; INTRAVENOUS
Status: DISCONTINUED | OUTPATIENT
Start: 2022-05-24 | End: 2022-05-24 | Stop reason: HOSPADM

## 2022-05-24 RX ORDER — HALOPERIDOL 5 MG/ML
1 INJECTION INTRAMUSCULAR
Status: DISCONTINUED | OUTPATIENT
Start: 2022-05-24 | End: 2022-05-24 | Stop reason: HOSPADM

## 2022-05-24 RX ORDER — HYDROMORPHONE HYDROCHLORIDE 1 MG/ML
0.2 INJECTION, SOLUTION INTRAMUSCULAR; INTRAVENOUS; SUBCUTANEOUS
Status: DISCONTINUED | OUTPATIENT
Start: 2022-05-24 | End: 2022-05-24 | Stop reason: HOSPADM

## 2022-05-24 RX ORDER — CITRIC ACID/SODIUM CITRATE 334-500MG
30 SOLUTION, ORAL ORAL ONCE
Status: COMPLETED | OUTPATIENT
Start: 2022-05-24 | End: 2022-05-24

## 2022-05-24 RX ORDER — OXYCODONE HYDROCHLORIDE 5 MG/1
5 TABLET ORAL EVERY 4 HOURS PRN
Status: DISCONTINUED | OUTPATIENT
Start: 2022-05-24 | End: 2022-05-26 | Stop reason: HOSPADM

## 2022-05-24 RX ORDER — DIPHENHYDRAMINE HCL 25 MG
25 TABLET ORAL EVERY 6 HOURS PRN
Status: DISCONTINUED | OUTPATIENT
Start: 2022-05-24 | End: 2022-05-26 | Stop reason: HOSPADM

## 2022-05-24 RX ORDER — SODIUM CHLORIDE, SODIUM LACTATE, POTASSIUM CHLORIDE, CALCIUM CHLORIDE 600; 310; 30; 20 MG/100ML; MG/100ML; MG/100ML; MG/100ML
INJECTION, SOLUTION INTRAVENOUS PRN
Status: DISCONTINUED | OUTPATIENT
Start: 2022-05-24 | End: 2022-05-26 | Stop reason: HOSPADM

## 2022-05-24 RX ORDER — HYDRALAZINE HYDROCHLORIDE 20 MG/ML
5 INJECTION INTRAMUSCULAR; INTRAVENOUS
Status: DISCONTINUED | OUTPATIENT
Start: 2022-05-24 | End: 2022-05-24 | Stop reason: HOSPADM

## 2022-05-24 RX ORDER — KETOROLAC TROMETHAMINE 30 MG/ML
30 INJECTION, SOLUTION INTRAMUSCULAR; INTRAVENOUS EVERY 6 HOURS
Status: DISPENSED | OUTPATIENT
Start: 2022-05-24 | End: 2022-05-25

## 2022-05-24 RX ORDER — ONDANSETRON 2 MG/ML
4 INJECTION INTRAMUSCULAR; INTRAVENOUS EVERY 6 HOURS PRN
Status: DISCONTINUED | OUTPATIENT
Start: 2022-05-24 | End: 2022-05-26 | Stop reason: HOSPADM

## 2022-05-24 RX ORDER — BUPIVACAINE HYDROCHLORIDE 7.5 MG/ML
INJECTION, SOLUTION INTRASPINAL PRN
Status: DISCONTINUED | OUTPATIENT
Start: 2022-05-24 | End: 2022-05-24 | Stop reason: SURG

## 2022-05-24 RX ORDER — SODIUM CHLORIDE, SODIUM GLUCONATE, SODIUM ACETATE, POTASSIUM CHLORIDE AND MAGNESIUM CHLORIDE 526; 502; 368; 37; 30 MG/100ML; MG/100ML; MG/100ML; MG/100ML; MG/100ML
500 INJECTION, SOLUTION INTRAVENOUS CONTINUOUS
Status: DISCONTINUED | OUTPATIENT
Start: 2022-05-24 | End: 2022-05-26 | Stop reason: HOSPADM

## 2022-05-24 RX ORDER — ONDANSETRON 2 MG/ML
4 INJECTION INTRAMUSCULAR; INTRAVENOUS
Status: DISCONTINUED | OUTPATIENT
Start: 2022-05-24 | End: 2022-05-24 | Stop reason: HOSPADM

## 2022-05-24 RX ORDER — SODIUM CHLORIDE, SODIUM GLUCONATE, SODIUM ACETATE, POTASSIUM CHLORIDE AND MAGNESIUM CHLORIDE 526; 502; 368; 37; 30 MG/100ML; MG/100ML; MG/100ML; MG/100ML; MG/100ML
1000 INJECTION, SOLUTION INTRAVENOUS ONCE
Status: COMPLETED | OUTPATIENT
Start: 2022-05-24 | End: 2022-05-23

## 2022-05-24 RX ADMIN — ACETAMINOPHEN 1000 MG: 500 TABLET ORAL at 23:23

## 2022-05-24 RX ADMIN — METOCLOPRAMIDE 10 MG: 5 INJECTION, SOLUTION INTRAMUSCULAR; INTRAVENOUS at 05:49

## 2022-05-24 RX ADMIN — BUPIVACAINE HYDROCHLORIDE IN DEXTROSE 1 ML: 7.5 INJECTION, SOLUTION SUBARACHNOID at 05:22

## 2022-05-24 RX ADMIN — DOCUSATE SODIUM 100 MG: 100 CAPSULE, LIQUID FILLED ORAL at 10:06

## 2022-05-24 RX ADMIN — HYDROMORPHONE HYDROCHLORIDE 0.4 MG: 1 INJECTION, SOLUTION INTRAMUSCULAR; INTRAVENOUS; SUBCUTANEOUS at 07:46

## 2022-05-24 RX ADMIN — MIDAZOLAM HYDROCHLORIDE 2 MG: 1 INJECTION, SOLUTION INTRAMUSCULAR; INTRAVENOUS at 06:19

## 2022-05-24 RX ADMIN — MEPERIDINE HYDROCHLORIDE 12.5 MG: 25 INJECTION INTRAMUSCULAR; INTRAVENOUS; SUBCUTANEOUS at 06:23

## 2022-05-24 RX ADMIN — FAMOTIDINE 20 MG: 10 INJECTION INTRAVENOUS at 05:49

## 2022-05-24 RX ADMIN — SODIUM CITRATE AND CITRIC ACID MONOHYDRATE 30 ML: 500; 334 SOLUTION ORAL at 05:49

## 2022-05-24 RX ADMIN — ROPIVACAINE HYDROCHLORIDE: 2 INJECTION, SOLUTION EPIDURAL; INFILTRATION at 03:48

## 2022-05-24 RX ADMIN — OXYTOCIN 20 UNITS: 10 INJECTION, SOLUTION INTRAMUSCULAR; INTRAVENOUS at 06:05

## 2022-05-24 RX ADMIN — OXYCODONE HYDROCHLORIDE 10 MG: 5 SOLUTION ORAL at 07:20

## 2022-05-24 RX ADMIN — OXYCODONE 5 MG: 5 TABLET ORAL at 20:40

## 2022-05-24 RX ADMIN — MEPERIDINE HYDROCHLORIDE 12.5 MG: 25 INJECTION INTRAMUSCULAR; INTRAVENOUS; SUBCUTANEOUS at 06:07

## 2022-05-24 RX ADMIN — ACETAMINOPHEN 1000 MG: 500 TABLET ORAL at 18:09

## 2022-05-24 RX ADMIN — FENTANYL CITRATE 100 MCG: 50 INJECTION, SOLUTION INTRAMUSCULAR; INTRAVENOUS at 04:35

## 2022-05-24 RX ADMIN — FENTANYL CITRATE 50 MCG: 50 INJECTION, SOLUTION INTRAMUSCULAR; INTRAVENOUS at 06:20

## 2022-05-24 RX ADMIN — AZITHROMYCIN FOR INJECTION INJECTION, POWDER, LYOPHILIZED, FOR SOLUTION 500 MG: 500 INJECTION INTRAVENOUS at 05:50

## 2022-05-24 RX ADMIN — CEFAZOLIN 2 G: 330 INJECTION, POWDER, FOR SOLUTION INTRAMUSCULAR; INTRAVENOUS at 06:00

## 2022-05-24 RX ADMIN — ACETAMINOPHEN 1000 MG: 500 TABLET ORAL at 12:04

## 2022-05-24 RX ADMIN — SODIUM CHLORIDE, SODIUM GLUCONATE, SODIUM ACETATE, POTASSIUM CHLORIDE AND MAGNESIUM CHLORIDE 500 ML: 526; 502; 368; 37; 30 INJECTION, SOLUTION INTRAVENOUS at 07:43

## 2022-05-24 RX ADMIN — DOCUSATE SODIUM 100 MG: 100 CAPSULE, LIQUID FILLED ORAL at 18:08

## 2022-05-24 RX ADMIN — KETOROLAC TROMETHAMINE 30 MG: 30 INJECTION, SOLUTION INTRAMUSCULAR; INTRAVENOUS at 16:36

## 2022-05-24 RX ADMIN — FENTANYL CITRATE 50 MCG: 50 INJECTION, SOLUTION INTRAMUSCULAR; INTRAVENOUS at 06:19

## 2022-05-24 RX ADMIN — KETOROLAC TROMETHAMINE 30 MG: 30 INJECTION, SOLUTION INTRAMUSCULAR; INTRAVENOUS at 10:06

## 2022-05-24 RX ADMIN — OXYCODONE 5 MG: 5 TABLET ORAL at 12:04

## 2022-05-24 ASSESSMENT — EDINBURGH POSTNATAL DEPRESSION SCALE (EPDS)
I HAVE BEEN SO UNHAPPY THAT I HAVE BEEN CRYING: NO, NEVER
I HAVE FELT SCARED OR PANICKY FOR NO GOOD REASON: NO, NOT AT ALL
I HAVE BEEN ABLE TO LAUGH AND SEE THE FUNNY SIDE OF THINGS: AS MUCH AS I ALWAYS COULD
I HAVE BLAMED MYSELF UNNECESSARILY WHEN THINGS WENT WRONG: NO, NEVER
THE THOUGHT OF HARMING MYSELF HAS OCCURRED TO ME: NEVER
I HAVE BEEN ANXIOUS OR WORRIED FOR NO GOOD REASON: NO, NOT AT ALL
I HAVE BEEN SO UNHAPPY THAT I HAVE HAD DIFFICULTY SLEEPING: NOT AT ALL
THINGS HAVE BEEN GETTING ON TOP OF ME: NO, I HAVE BEEN COPING AS WELL AS EVER
I HAVE LOOKED FORWARD WITH ENJOYMENT TO THINGS: AS MUCH AS I EVER DID
I HAVE FELT SAD OR MISERABLE: NO, NOT AT ALL

## 2022-05-24 ASSESSMENT — PAIN DESCRIPTION - PAIN TYPE
TYPE: ACUTE PAIN
TYPE: ACUTE PAIN
TYPE: SURGICAL PAIN
TYPE: ACUTE PAIN
TYPE: SURGICAL PAIN
TYPE: SURGICAL PAIN

## 2022-05-24 ASSESSMENT — PAIN SCALES - GENERAL: PAIN_LEVEL: 1

## 2022-05-24 NOTE — CARE PLAN
The patient is Watcher - Medium risk of patient condition declining or worsening    Shift Goals  Clinical Goals: Healthy mom and healthy baby  Patient Goals: Rest and cervical change  Family Goals: Support    Progress made toward(s) clinical / shift goals:    Problem: Knowledge Deficit - L&D  Goal: Patient and family/caregivers will demonstrate understanding of plan of care, disease process/condition, diagnostic tests and medications  Outcome: Progressing     Problem: Risk for Infection and Impaired Wound Healing  Goal: Patient will remain free from infection  Outcome: Progressing     Problem: Risk for Injury  Goal: Patient and fetus will be free of preventable injury/complications  Outcome: Progressing     Problem: Pain  Goal: Patient's pain will be alleviated or reduced to the patient’s comfort goal  Outcome: Progressing

## 2022-05-24 NOTE — ANESTHESIA TIME REPORT
Anesthesia Start and Stop Event Times     Date Time Event    5/23/2022 1823 Ready for Procedure     1823 Anesthesia Start    5/24/2022 0647 Anesthesia Stop        Responsible Staff  05/23/22 to 05/24/22    Name Role Begin End    Patricio Marshall M.D. Anesth 1823 0647        Overtime Reason:  no overtime (within assigned shift)    Comments: TO OR for C section at 05:23, fetal intolerance of labor

## 2022-05-24 NOTE — PROGRESS NOTES
MD L&D progress note     S: pt comfortable w/ epidural    O: VSS afebrile  FHR - 120, pos accels, neg decels, mod variability, cat 1 FHR  [place] - every 2-3 minutes  SVE - 4-5/100/-1 per RN exam     A: IUP at 39+3 wks, early active labor, single umbilical artery, AMA, IVF pregnancy, GBS neg     P: Continue labor management, fetal monitoring/toco, IV fluids

## 2022-05-24 NOTE — ANESTHESIA PROCEDURE NOTES
Epidural Block    Date/Time: 5/23/2022 6:24 PM  Performed by: Patricio Marshall M.D.  Authorized by: Patricio Marshall M.D.     Patient Location:  OB  Start Time:  5/23/2022 6:24 PM  End Time:  5/23/2022 6:35 PM  Reason for Block: labor analgesia    patient identified, IV checked, site marked, risks and benefits discussed, surgical consent, monitors and equipment checked, pre-op evaluation and timeout performed    Patient Position:  Sitting  Prep: ChloraPrep, patient draped and sterile technique    Monitoring:  Blood pressure, continuous pulse oximetry and heart rate  Approach:  Midline  Location:  L2-L3  Injection Technique:  JACK saline  Skin infiltration:  Lidocaine  Strength:  1%  Dose:  3ml  Needle Type:  Tuohy  Needle Gauge:  17 G  Needle Length:  3.5 in  Loss of resistance::  5  Catheter Size:  19 G  Catheter at Skin Depth:  12  Test Dose Result:  Negative

## 2022-05-24 NOTE — ANESTHESIA POSTPROCEDURE EVALUATION
Patient: Carlotta Doshi    Procedure Summary     Date: 05/23/22 Room / Location:     Anesthesia Start: 1823 Anesthesia Stop: 05/24/22 0647    Procedure: Labor Epidural Diagnosis:     Scheduled Providers:  Responsible Provider: Patricio Marshall M.D.    Anesthesia Type: epidural ASA Status: 2          Final Anesthesia Type: epidural  Last vitals  BP   Blood Pressure: 133/63    Temp   36.1 °C (96.9 °F)    Pulse   83   Resp   16    SpO2   98 %      Anesthesia Post Evaluation    Patient location during evaluation: PACU  Patient participation: complete - patient participated  Level of consciousness: awake and alert  Pain score: 1    Airway patency: patent  Anesthetic complications: no  Cardiovascular status: hemodynamically stable  Respiratory status: acceptable  Hydration status: euvolemic    PONV: none          No complications documented.     Nurse Pain Score: 2 (NPRS)

## 2022-05-24 NOTE — PROGRESS NOTES
1900 Report received from Estella CREWS. Patient laying comfortably with epidural. Support at bedside. All patient questions answered at this time.    1915 SVE, see flowsheets.    2315 SVE, see flowsheets.    0048 SVE, complete. See flowsheets.    0052 Patient begins trial pushes x3, requests to continue pushing upon feeling consistent rectal pressure.     0429 Patient complains of increased sensation with epidural. Assessed epidural site, epidural leaking from insertion site. Dr. Marshall called to room for assessment.    0455 Dr. Marshall at bedside. Time out called, Epidural attempted. Spinal block placed.    0538 Dr. Collins at bedside, c-section called due to fetal intolerance.     0604 Delivery of viable baby boy Kirt.    0641 Patient transferred out of OR in stable condition via gurney.    0650 Report given to Estella CREWS.

## 2022-05-24 NOTE — PROGRESS NOTES
MD L&D progress note     S: Pt has been pushing for 2 hours, epidural fell out during pushing and was replaced with a spinal block, now patient very numb and feels better    O: VSS afebrile  FHR - 120s, pos accels, late decels with pushing down the 70s-80s with slow return to baseline, moderate variability  [place] - every 3-5 minutes  SVE - complete, +1 station     A: IUP at 39+4wks, NRFS, SUA, AMA, IVF pregnancy, GBS neg     P: Patient has at least 1 hour or more of pushing and baby is having late decels with pushing. Recommend delivery via primary  due to fetal distress. Pt and  agree and would like to proceed with surgery. Anesthesia and OR team notified, proceed to the OR ASAP.    Discussed with the patient the risks of  delivery. The risks include pain, infection, bleeding, scarring, damage to other organs in the area of operation, anesthesia complications, and death. Specifically organs that can be damaged are bowel, bladder, ureters. I also discussed with the patient the risk of wound infection and wound breakdown. We discussed that these risks are greater in people that have diabetes or obesity. I also discussed the risk of emergency blood transfusion during procedure as well as emergency hysterectomy during procedure. Patient had the opportunity to ask questions regarding procedures. All questions answered to the patient's satisfaction. Pt agrees to proceed with surgery.

## 2022-05-24 NOTE — OP REPORT
DATE OF SERVICE:  2022     PREOPERATIVE DIAGNOSES:  1.  Intrauterine pregnancy at 39w4d  2.  Non-reassuring fetal surveillance in 2nd stage of labor  3.  Single umbilical artery  4.  AMA  5.  IVF pregnancy  6.  GBS neg     POSTOPERATIVE DIAGNOSES:  1.  Intrauterine pregnancy at 39w4d  2.  same    PROCEDURE PERFORMED:  Primary low transverse  section.     SURGEON:  Twila Smiley MD     ASSISTANT: Brittany Romo MD     ANESTHESIA:  Spinal.     ANESTHESIOLOGIST:  Patricio Marshall MD     SPECIMEN:  cord blood gases only     ESTIMATED BLOOD LOSS:  500 mL     FINDINGS:  A viable male infant, weight 7lb 13oz, Apgars of 8 and 9 in vertex presentation, left occiput facing transverse, with clear amniotic fluid.  Single umbilical artery. There was a normal uterus, tubes, and ovaries bilaterally.     COMPLICATIONS:  None.     PROCEDURE:  After appropriate consents were obtained, the patient was taken to the operating room where spinal  anesthesia was found to be adequate. Spinal anesthesia was initially placed in the labor room due to the epidural falling out during pushing.  The patient was then prepped and draped in the usual sterile manner.  Clamp test on the skin verified adequate anesthesia.  A Pfannenstiel incision was made with a scalpel 3cm superior to the pubic symphysis and extended down to the rectus fascia.  The rectus fascia was incised with the scalpel and tented up. The underlying rectus muscle was  from the fascia first inferiorly and then superiorly using the garcia scissors.  The rectus muscle was  bluntly in the midline.  The peritoneum was entered bluntly in the midline. The peritoneum incision was extended superiorly and inferiorly with the Garcia scissors with great care to avoid injury to underlying bowel or bladder.  A bladder blade retractor was placed. The vesicouterine peritoneum was tented up and entered with Metzenbaum scissors, and a bladder flap was created.  An incision  was made into the lower uterine segment transversely and the incision was extended bluntly.  Amniotomy was performed and there was noted to be clear amniotic fluid. The Infant's head was grasped and delivered atraumatically followed by the remainder of the body without any complications.  The mouth and nares were suctioned. The cord was doubly clamped and cut after a 30 second delay and the infant was handed off to awaiting neonatology team.  The placenta was then allowed to spontaneously deliver. The uterus was exteriorized and cleared of clots and debris.  The hysterotomy incision was reapproximated with 1-0 Vicryl suture in a running locked fashion. A second layer of the same suture was placed to imbricate the incision creating a 2 layer closure.  Hemostasis was noted.  The tubes and ovaries were examined and noted to be normal.  The uterus was returned to the abdomen. The pericolic gutters were examined and any blood clots were removed.  The retractor was removed. The rectus muscles were examined and hemostatic.  The fascia was reapproximated with 0 Vicryl suture running.  The subcutaneous fat was irrigated and any small bleeders were bovied for hemostasis.  The subcutaneous fat was then reapproximated with 3-0 Vicryl suture running.  The skin was injected with lidocaine for local anesthesia and then reapproximated with 4-0 Monocryl suture running. Steri strips and a Mepilex dressing were placed.  Sponge, needle, instrument, and lap counts were correct x2.  Patient tolerated the procedure well and went to recovery room in stable condition.          Twila Smiley M.D.

## 2022-05-24 NOTE — ANESTHESIA PREPROCEDURE EVALUATION
Date: 05/23/22  Procedure: Labor Epidural         Relevant Problems   No relevant active problems       Physical Exam    Airway   Mallampati: II  TM distance: >3 FB  Neck ROM: full       Cardiovascular - normal exam  Rhythm: regular  Rate: normal  (-) murmur     Dental - normal exam           Pulmonary - normal exam  Breath sounds clear to auscultation     Abdominal    Neurological - normal exam                 Anesthesia Plan    ASA 2       Plan - epidural   Neuraxial block will be labor analgesia                  Pertinent diagnostic labs and testing reviewed    Informed Consent:    Anesthetic plan and risks discussed with patient.

## 2022-05-25 LAB
ALBUMIN SERPL BCP-MCNC: 3.1 G/DL (ref 3.2–4.9)
ALBUMIN/GLOB SERPL: 1.5 G/DL
ALP SERPL-CCNC: 122 U/L (ref 30–99)
ALT SERPL-CCNC: 16 U/L (ref 2–50)
ANION GAP SERPL CALC-SCNC: 8 MMOL/L (ref 7–16)
AST SERPL-CCNC: 24 U/L (ref 12–45)
BASOPHILS # BLD AUTO: 0.6 % (ref 0–1.8)
BASOPHILS # BLD: 0.1 K/UL (ref 0–0.12)
BILIRUB SERPL-MCNC: <0.2 MG/DL (ref 0.1–1.5)
BUN SERPL-MCNC: 11 MG/DL (ref 8–22)
CALCIUM SERPL-MCNC: 8.6 MG/DL (ref 8.5–10.5)
CHLORIDE SERPL-SCNC: 108 MMOL/L (ref 96–112)
CO2 SERPL-SCNC: 22 MMOL/L (ref 20–33)
CREAT SERPL-MCNC: 0.81 MG/DL (ref 0.5–1.4)
EOSINOPHIL # BLD AUTO: 0.48 K/UL (ref 0–0.51)
EOSINOPHIL NFR BLD: 3 % (ref 0–6.9)
ERYTHROCYTE [DISTWIDTH] IN BLOOD BY AUTOMATED COUNT: 45 FL (ref 35.9–50)
ERYTHROCYTE [DISTWIDTH] IN BLOOD BY AUTOMATED COUNT: 45.3 FL (ref 35.9–50)
GFR SERPLBLD CREATININE-BSD FMLA CKD-EPI: 96 ML/MIN/1.73 M 2
GLOBULIN SER CALC-MCNC: 2.1 G/DL (ref 1.9–3.5)
GLUCOSE SERPL-MCNC: 84 MG/DL (ref 65–99)
HCT VFR BLD AUTO: 34.6 % (ref 37–47)
HCT VFR BLD AUTO: 34.6 % (ref 37–47)
HGB BLD-MCNC: 11.6 G/DL (ref 12–16)
HGB BLD-MCNC: 11.7 G/DL (ref 12–16)
IMM GRANULOCYTES # BLD AUTO: 0.1 K/UL (ref 0–0.11)
IMM GRANULOCYTES NFR BLD AUTO: 0.6 % (ref 0–0.9)
LYMPHOCYTES # BLD AUTO: 2.9 K/UL (ref 1–4.8)
LYMPHOCYTES NFR BLD: 18.3 % (ref 22–41)
MCH RBC QN AUTO: 30.6 PG (ref 27–33)
MCH RBC QN AUTO: 31.1 PG (ref 27–33)
MCHC RBC AUTO-ENTMCNC: 33.5 G/DL (ref 33.6–35)
MCHC RBC AUTO-ENTMCNC: 33.8 G/DL (ref 33.6–35)
MCV RBC AUTO: 91.3 FL (ref 81.4–97.8)
MCV RBC AUTO: 92 FL (ref 81.4–97.8)
MONOCYTES # BLD AUTO: 1.23 K/UL (ref 0–0.85)
MONOCYTES NFR BLD AUTO: 7.8 % (ref 0–13.4)
NEUTROPHILS # BLD AUTO: 11.02 K/UL (ref 2–7.15)
NEUTROPHILS NFR BLD: 69.7 % (ref 44–72)
NRBC # BLD AUTO: 0 K/UL
NRBC BLD-RTO: 0 /100 WBC
PLATELET # BLD AUTO: 195 K/UL (ref 164–446)
PLATELET # BLD AUTO: 236 K/UL (ref 164–446)
PMV BLD AUTO: 10.4 FL (ref 9–12.9)
PMV BLD AUTO: 11.1 FL (ref 9–12.9)
POTASSIUM SERPL-SCNC: 4.1 MMOL/L (ref 3.6–5.5)
PROT SERPL-MCNC: 5.2 G/DL (ref 6–8.2)
RBC # BLD AUTO: 3.76 M/UL (ref 4.2–5.4)
RBC # BLD AUTO: 3.79 M/UL (ref 4.2–5.4)
SODIUM SERPL-SCNC: 138 MMOL/L (ref 135–145)
URATE SERPL-MCNC: 5.3 MG/DL (ref 1.9–8.2)
WBC # BLD AUTO: 15.8 K/UL (ref 4.8–10.8)
WBC # BLD AUTO: 16.9 K/UL (ref 4.8–10.8)

## 2022-05-25 PROCEDURE — A9270 NON-COVERED ITEM OR SERVICE: HCPCS | Performed by: OBSTETRICS & GYNECOLOGY

## 2022-05-25 PROCEDURE — 85025 COMPLETE CBC W/AUTO DIFF WBC: CPT

## 2022-05-25 PROCEDURE — 36415 COLL VENOUS BLD VENIPUNCTURE: CPT

## 2022-05-25 PROCEDURE — 700102 HCHG RX REV CODE 250 W/ 637 OVERRIDE(OP): Performed by: OBSTETRICS & GYNECOLOGY

## 2022-05-25 PROCEDURE — 85027 COMPLETE CBC AUTOMATED: CPT

## 2022-05-25 PROCEDURE — 84550 ASSAY OF BLOOD/URIC ACID: CPT

## 2022-05-25 PROCEDURE — 770002 HCHG ROOM/CARE - OB PRIVATE (112)

## 2022-05-25 PROCEDURE — 80053 COMPREHEN METABOLIC PANEL: CPT

## 2022-05-25 RX ORDER — IBUPROFEN 800 MG/1
800 TABLET ORAL EVERY 8 HOURS
Status: DISCONTINUED | OUTPATIENT
Start: 2022-05-25 | End: 2022-05-26 | Stop reason: HOSPADM

## 2022-05-25 RX ORDER — IBUPROFEN 800 MG/1
800 TABLET ORAL EVERY 8 HOURS PRN
Status: DISCONTINUED | OUTPATIENT
Start: 2022-05-28 | End: 2022-05-26 | Stop reason: HOSPADM

## 2022-05-25 RX ADMIN — IBUPROFEN 800 MG: 800 TABLET, FILM COATED ORAL at 14:37

## 2022-05-25 RX ADMIN — ACETAMINOPHEN 1000 MG: 500 TABLET ORAL at 14:36

## 2022-05-25 RX ADMIN — DOCUSATE SODIUM 100 MG: 100 CAPSULE, LIQUID FILLED ORAL at 08:51

## 2022-05-25 RX ADMIN — IBUPROFEN 800 MG: 800 TABLET, FILM COATED ORAL at 08:51

## 2022-05-25 RX ADMIN — ACETAMINOPHEN 1000 MG: 500 TABLET ORAL at 20:56

## 2022-05-25 RX ADMIN — PRENATAL WITH FERROUS FUM AND FOLIC ACID 1 TABLET: 3080; 920; 120; 400; 22; 1.84; 3; 20; 10; 1; 12; 200; 27; 25; 2 TABLET ORAL at 08:52

## 2022-05-25 RX ADMIN — OXYCODONE 5 MG: 5 TABLET ORAL at 14:36

## 2022-05-25 RX ADMIN — ACETAMINOPHEN 1000 MG: 500 TABLET ORAL at 08:47

## 2022-05-25 RX ADMIN — OXYCODONE 5 MG: 5 TABLET ORAL at 08:46

## 2022-05-25 RX ADMIN — OXYCODONE 5 MG: 5 TABLET ORAL at 19:56

## 2022-05-25 RX ADMIN — MAGNESIUM HYDROXIDE 30 ML: 400 SUSPENSION ORAL at 19:57

## 2022-05-25 RX ADMIN — OXYCODONE 5 MG: 5 TABLET ORAL at 02:43

## 2022-05-25 RX ADMIN — DOCUSATE SODIUM 100 MG: 100 CAPSULE, LIQUID FILLED ORAL at 20:57

## 2022-05-25 RX ADMIN — IBUPROFEN 800 MG: 800 TABLET, FILM COATED ORAL at 22:01

## 2022-05-25 ASSESSMENT — PAIN DESCRIPTION - PAIN TYPE
TYPE: SURGICAL PAIN

## 2022-05-25 NOTE — CARE PLAN
The patient is Stable - Low risk of patient condition declining or worsening    Shift Goals  Clinical Goals: Pain control, ambulation, bleeding wnl  Patient Goals: Work on breastfeeding  Family Goals: Bond and rest    Progress made toward(s) clinical / shift goals:    Problem: Knowledge Deficit - Postpartum  Goal: Patient will verbalize and demonstrate understanding of self and infant care  Outcome: Progressing     Problem: Psychosocial - Postpartum  Goal: Patient will verbalize and demonstrate effective bonding and parenting behavior  Outcome: Progressing     Problem: Altered Physiologic Condition  Goal: Patient physiologically stable as evidenced by normal lochia, palpable uterine involution and vitals within normal limits  Outcome: Progressing     Problem: Early Mobilization - Post Surgery  Goal: Early mobilization post surgery  Outcome: Progressing

## 2022-05-25 NOTE — CARE PLAN
The patient is Stable - Low risk of patient condition declining or worsening    Shift Goals  Clinical Goals: Maintain fundus firm, lochia light; pain management; pt to ambulate  Patient Goals: Rest and cervical change  Family Goals: Support    Progress made toward(s) clinical / shift goals:  Fundus firm, lochia scant to light; patient reported pain relief with use of scheduled tylenol and toradol, use of prn oxycodone, and use of heat packs and ice packs as needed.

## 2022-05-25 NOTE — CONSULTS
Initial Visit:    UZMA, , delivered baby yesterday, 22, at 0604 at 39.4 weeks gestation.  Infant is approximately 27 hours old.  Risk factors for breastfeeding are: history of breast augmentation performed 20 years ago, IVF pregnancy, and infant with possible tongue tie.  Infant's weight loss to date remains within defined limits at 1.69% and infant has had multiple voids and stools since delivery.    History of BF: None.  This is UZMA's first baby.    Report of Current Breastfeeding Status: MOB stated she has been able to latch baby independently onto both breasts and stated baby breast fed approximately 15 times over the past 24 hours.  MOB expressed concern because she has been unable to express colostrum from her left breast this morning and stated baby has been fussy at the breast which also began this morning.    Breastfeeding Assistance: Demonstrated and taught hand expression.  Observed one drop of colostrum at the left breast and also at the right breast.  There was a small blood filled blister (pin point sized) at the right nipple which expelled a small amount of blood when hand expression was performed at this breast.  No pressure was exerted against the right nipple when hand expression was performed.  Colostrum expelled was applied to the right nipple.  Also, observed MOB attempt to latch baby at her left breast in the football hold position.  Encouraged MOB to align baby's nose with her nipple.  Infant very fussy at the breast and latch was brief.  Infant pulled away from it almost immediately and began to cry.  Infant removed from the breast and burped by this LC.  Infant burped and then immediately calmed down.  He was placed back to the left breast and latched onto the breast, but grew fussy as he began to suck.  Latch again was brief and he was then placed to the right breast in the cross cradle position.  Baby continued to be fussy and was then placed on MOB's chest (skin to skin).  He  continued to root and latch attempt was again made, but he continued to pull away from the breast.  Infant was then swaddled and placed belly down on this LC's forearm where he calmed down immediately.  MOB will attempt to remove gas from infant's belly through soothing measures and will attempt to latch him per feeding cues.  If infant continues to struggle onto the breast to feed, three step feeding plan may be implemented to include: breastfeed attempts, supplementation per hospital supplementation guidelines, and pumping/hand expression.  MOB in agreement with feeding plan for today.    Provided breastfeeding education on: hunger cues, cluster feeding, milk production, pacifier use, pump initiation, signs of successful milk transfer, sore nipple/breast care treatment plan and frequency of breastfeeds.    Plan: Continue to offer infant the breast per feeding cues for a minimum of 8 or more feeds in a 24 hour period.    MOB was encouraged to do as much skin to skin with infant as possible.    MOB plans to schedule a visit with [facility] promptly post discharge to ensure breastfeeding continues to go well at home.    MOB verbalized understanding of all information provided to her and denied having any lactation questions and/or concerns at this time.  She was encouraged to call for lactation support as needed.    Feeding plan of care for infant discussed with RN.

## 2022-05-25 NOTE — DISCHARGE PLANNING
Discharge Planning Assessment Post Partum    Reason for Referral: Hx of anixiety  Address: [address]  Phone:[phone]  Type of Living Situation:Stable lives with Spouse  Mom Diagnosis: Postpartum  Baby Diagnosis:   Primary Language: English    Name of Baby: Kirt Doshi  Father of the Baby: Demetrius Doshi  Involved in baby’s care? Yes  Contact Information: [phone]    Prenatal Care: Yes  Mom's PCP: Tim Sheridan  PCP for new baby:Nolberto Gaona     Support System: MOB stated good support  Coping/Bonding between mother & baby: MOB coping/bonding during assessment   Source of Feeding: Breastfeeding  Supplies for Infant: MOB stated having all supplies     Mom's Insurance: [employer] will be switching to [employer]  Baby Covered on Insurance:MOB will add baby  Mother Employed/School: [employer]  Other children in the home/names & ages: None first baby     Financial Hardship/Income: None dientified   Mom's Mental status: Stable and appropriate   Services used prior to admit: None    CPS History: None  Psychiatric History: Hx of anxiety. MOB denied   Domestic Violence History: None   Drug/ETOH History: None    Resources Provided:  provided postpartum depression resources   Referrals Made: None     Clearance for Discharge: Baby is cleared to discharge with MOB and FOB when cleared

## 2022-05-25 NOTE — PROGRESS NOTES
0810- Patient arrived to Room S303.  Report received from YUE Soria RN.  0820- Patient assessment done.  IV patent.  Indwelling catheter in place.  Sequential stockings on.  Mepilex dressing is clean, dry, and intact.  Discussed pain management with patient.  Patient oriented to room and call system.  Reviewed plan of care.  Patient verbalized understanding. FOB at bedside.  0940- MD orders reviewed.  IV saline locked.  1315- Patient assisted to dangle at the bedside, then stand at the bedside.  Patient denied dizziness.  Patient ambulated to bathroom with standby assist.  Patient had steady gait.  Marguerite-care done.  Pads changed.  Indwelling catheter discontinued.  1515- Patient reported she voided twice since catheter removal but did not use measuring hat.  Patient instructed to use measuring hat for next void.  1636- Patient back in bed after showering.  Mepilex dressing became soaked during shower.  Mepilex dressing removed and incision is open to air with steristrips in place.  Incision is approximated, clean, and dry.  Dr. Pierce notified.  Per Dr. Pierce, may leave incision open to air with steristrips in place.  Update given to patient.  Dr. Pierce notified that no current rubella results are available.  Telephone order received to obtain rubella status.  1809- Patient voided, measured 25 mls.  Patient encouraged to increase PO fluids.

## 2022-05-25 NOTE — PROGRESS NOTES
1900- Report received from ELEONORA Clayton.  Patient sitting up in bed.  Denies needs at this time. Assumed care.     2030- Assessment completed. Incision YASSINE with steristrips.  Patient ambulating in room, passing gas and voided 250ml. Patient reports tolerable pain level.  Assisted patient with latching infant in football hold and taught her about positioning and latch technique.  Reviewed discharge education verbally and written.  Will continue to monitor patient condition.

## 2022-05-25 NOTE — PROGRESS NOTES
Progress Note    Carlotta Doshi   Post-op day 1  Chief Admitting Dx: Indication for care in labor or delivery [O75.9]  Delivery Type:  for fetal distress, failure to progress      Subjective:  Ambulating: yes  Tolerating oral feed: yes  Flatus: no    Voiding: yes  Dizziness: no  Vitals:    22 2150 22 0158 22 0300 22 0542   BP: (!) 133/98 (!) 136/95 (!) 140/70 136/88   Pulse: 80 77  78   Resp: 17    Temp: 37.1 °C (98.7 °F) 36.7 °C (98.1 °F)  36.4 °C (97.6 °F)   TempSrc: Temporal Temporal  Temporal   SpO2: 97% 97% 97% 97%   Weight:       Height:           Exam:  Abdomen: Abdomen soft, non-tender. BS normal. No masses,  No organomegaly  Incision: dry and intact  Fundus Tenderness: no  Below umbilicus: yes  Perineum: perineum intact  Extremities: Normal  Lochia: mild    Labs:   Recent Results (from the past 24 hour(s))   RUBELLA ABS IGG    Collection Time: 22  6:52 PM   Result Value Ref Range    Rubella IgG Antibody 58.40 IU/mL       Assessment:  Continue Routine postpartum care      Plan:  Advance Diet and Encourange Ambulation    Yareli Pierce M.D.

## 2022-05-26 VITALS
RESPIRATION RATE: 17 BRPM | DIASTOLIC BLOOD PRESSURE: 89 MMHG | OXYGEN SATURATION: 99 % | WEIGHT: 202 LBS | HEART RATE: 67 BPM | HEIGHT: 66 IN | TEMPERATURE: 98.3 F | BODY MASS INDEX: 32.47 KG/M2 | SYSTOLIC BLOOD PRESSURE: 130 MMHG

## 2022-05-26 PROBLEM — E55.9 VITAMIN D DEFICIENCY: Status: RESOLVED | Noted: 2017-04-06 | Resolved: 2022-05-26

## 2022-05-26 PROBLEM — M77.8 EXTENSOR TENDONITIS OF FOOT: Status: RESOLVED | Noted: 2018-11-02 | Resolved: 2022-05-26

## 2022-05-26 PROBLEM — F41.9 ANXIETY: Status: RESOLVED | Noted: 2017-02-23 | Resolved: 2022-05-26

## 2022-05-26 PROBLEM — L82.1 SEBORRHEIC KERATOSIS: Status: RESOLVED | Noted: 2017-07-17 | Resolved: 2022-05-26

## 2022-05-26 PROBLEM — H02.826 SEBACEOUS CYST OF LEFT EYELID: Status: RESOLVED | Noted: 2017-07-17 | Resolved: 2022-05-26

## 2022-05-26 PROBLEM — N83.201 RIGHT OVARIAN CYST: Status: RESOLVED | Noted: 2019-03-28 | Resolved: 2022-05-26

## 2022-05-26 PROBLEM — M62.838 NECK MUSCLE SPASM: Status: RESOLVED | Noted: 2017-04-06 | Resolved: 2022-05-26

## 2022-05-26 PROCEDURE — 700102 HCHG RX REV CODE 250 W/ 637 OVERRIDE(OP): Performed by: OBSTETRICS & GYNECOLOGY

## 2022-05-26 PROCEDURE — A9270 NON-COVERED ITEM OR SERVICE: HCPCS | Performed by: OBSTETRICS & GYNECOLOGY

## 2022-05-26 RX ORDER — ACETAMINOPHEN 325 MG/1
650 TABLET ORAL EVERY 6 HOURS
Qty: 56 TABLET | Refills: 0 | Status: SHIPPED | OUTPATIENT
Start: 2022-05-26 | End: 2022-06-02

## 2022-05-26 RX ORDER — OXYCODONE HYDROCHLORIDE 5 MG/1
5 TABLET ORAL EVERY 8 HOURS PRN
Qty: 21 TABLET | Refills: 0 | Status: SHIPPED | OUTPATIENT
Start: 2022-05-26 | End: 2022-06-02

## 2022-05-26 RX ORDER — IBUPROFEN 600 MG/1
600 TABLET ORAL EVERY 6 HOURS PRN
Qty: 28 TABLET | Refills: 0 | Status: SHIPPED | OUTPATIENT
Start: 2022-05-26 | End: 2022-06-02

## 2022-05-26 RX ORDER — PSEUDOEPHEDRINE HCL 30 MG
100 TABLET ORAL 2 TIMES DAILY
Qty: 60 CAPSULE | Refills: 0 | Status: SHIPPED | OUTPATIENT
Start: 2022-05-26 | End: 2022-06-09

## 2022-05-26 RX ADMIN — PRENATAL WITH FERROUS FUM AND FOLIC ACID 1 TABLET: 3080; 920; 120; 400; 22; 1.84; 3; 20; 10; 1; 12; 200; 27; 25; 2 TABLET ORAL at 08:41

## 2022-05-26 RX ADMIN — DOCUSATE SODIUM 100 MG: 100 CAPSULE, LIQUID FILLED ORAL at 08:41

## 2022-05-26 RX ADMIN — OXYCODONE 5 MG: 5 TABLET ORAL at 12:40

## 2022-05-26 RX ADMIN — ACETAMINOPHEN 1000 MG: 500 TABLET ORAL at 03:13

## 2022-05-26 RX ADMIN — ACETAMINOPHEN 1000 MG: 500 TABLET ORAL at 08:42

## 2022-05-26 RX ADMIN — IBUPROFEN 800 MG: 800 TABLET, FILM COATED ORAL at 08:41

## 2022-05-26 ASSESSMENT — PAIN DESCRIPTION - PAIN TYPE
TYPE: SURGICAL PAIN

## 2022-05-26 NOTE — LACTATION NOTE
Lactation Follow-up Visit:    S: MOB reported baby continued to be fussy at the breast throughout the night and could not sustain latch.  NOC RN initiated three step feeding plan which includes: breastfeeding/latch attempts, supplementation with formula and/or expressed breast milk, and pumping as instructed.  MOB stated baby has appeared much more comfortable since initiation of supplementation began.      O: Infant with elevated body temperature of 100.7F at 0240 this morning.  CBC performed and infant's WBC was 18.  Infant's total weight loss of 8% within defined limits and infant with three voids and three stools over the past 24 hours.    A&P: Unable to observe latch.  Infant in  nursery for circumcision.  Due to sub-optimal latch as described by MOB, elevated body temperature x 1, and possible tongue tie, three step feeding plan of breastfeeding, supplementation, and pumping/hand expression, will remain in place.    MOB stated she and infant may discharge home today and was informed she will need to continue with three step feeding plan at home.  MOB stated she has a personal breast pump (Spectra S2) for home use.  Educated MOB on the difference between a hospital grade breast pump and a personal breast pump in building and protecting her milk supply.  MOB provided with information on hospital grade breast pump rentals available to her through the [employer] [facility] and was encouraged to take pump parts home with her.  MOB also stated she will be meeting with Clau Mcallister from [facility] at Dr. Gaona's office post discharge.    MOB was provided with a copy of the hospital supplementation guidelines along with instructions on use.    Assessed flange fit and provided MOB with 22.5 mm flange inserts which she stated were much more comfortable.  Reviewed pump settings and they were: 80 CPM down to 60 after 2 minutes/suction set to comfort at 31%/pumps for 15 minutes along with 2-3  minutes of hand expression at each breast following each pumping session.    MOB verbalized understanding of all information provided to her and denied having any further questions at this time.  Encouraged MOB to call for lactation assistance as needed.

## 2022-05-26 NOTE — DISCHARGE INSTRUCTIONS
PATIENT DISCHARGE EDUCATION INSTRUCTION SHEET  REASONS TO CALL YOUR OBSTETRICIAN  Persistent fever, shaking, chills (Temperature higher than 100.4) may indicate you have an infection  Heavy bleeding: soaking more than 1 pad per hour; Passing clots an egg-sized clot or bigger may mean you have an postpartum hemorrhage  Foul odor from vagina or bad smelling or discolored discharge or blood  Breast infection (Mastitis symptoms); breast pain, chills, fever, redness or red streaks, may feel flu like symptoms  Urinary pain, burning or frequency  Incision that is not healing, increased redness, swelling, tenderness or pain, or any pus from episiotomy or  site may mean you have an infection  Redness, swelling, warmth, or painful to touch in the calf area of your leg may mean you have a blood clot  Severe or intensified depression, thoughts or feelings of wanting to hurt yourself or someone else   Pain in chest, obstructed breathing or shortness of breath (trouble catching your breath) may mean you are having a postpartum complication. Call your provider immediately   Headache that does not get better, even after taking medicine, a bad headache with vision changes or pain in the upper right area of your belly may mean you have high blood pressure or post birth preeclampsia. Call your provider immediately    HAND WASHING  All family and friends should wash their hands:  Before and after holding the baby  Before feeding the baby  After using the restroom or changing the baby's diaper    WOUND CARE  Ask your physician for additional care instructions. In general:   Incision:  May shower and pat incision dry   Keep the incision clean and dry  There should not be any opening or pus from the incision  Continue to walk at home 3 times a day   Do NOT lift anything heavier than your baby (over 10 pounds)  Encourage family to help participate in care of the  to allow rest and mom time to heal    VAGINAL CARE  "AND BLEEDING  Nothing inside vagina for 6 weeks:   No sexual intercourse, tampons or douching  Bleeding may continue for 2-4 weeks. Amount and color may vary  Soaking 1 pad or more in an hour for several hours is considered heavy bleeding  Passing large egg sized blood clots can be concerning  If you feel like you have heavy bleeding or are having increasing amount of blood clots call your Obstetrician immediately  If you begin feeling faint upon standing, feeling sick to your stomach, have clammy skin, a really fast heartbeat, have chills, start feeling confused, dizzy, sleepy or weak, or feeling like you're going to faint call your Obstetrician immediately    HYPERTENSION   Preeclampsia or gestational hypertension are types of high blood pressure that only pregnant women can get. It is important for you to be aware of symptoms to seek early intervention and treatment. If you have any of these symptoms immediately call your Obstetrician    Vision changes or blurred vision   Severe headache or pain that is unrelieved with medication and will not go away  Persistent pain in upper abdomen or shoulder   Increased swelling of face, feet, or hands  Difficulty breathing or shortness of breath at rest  Urinating less than usual    URINATION AND BOWEL MOVEMENTS  Eating more fiber (bran cereal, fruits, and vegetables) and drinking plenty of fluids will help to avoid constipation  Urinary frequency and urgency after childbirth is normal  If you experience any urinary pain, burning or frequency call your provider    BIRTH CONTROL  It is possible to become pregnant at any time after delivery and while breastfeeding  Plan to discuss a method of birth control with your physician at your post delivery follow up visit    POSTPARTUM BLUES  During the first few days after birth, you may experience a sense of the \"blues\" which may include impatience, irritability or even crying. These feelings come and go quickly. However, as many as " "1 in 10 women experience emotional symptoms known as postpartum depression.     POSTPARTUM DEPRESSION    May start as early as the second or third day after delivery or take several weeks or months to develop. Symptoms of \"blues\" are present, but are more intense: Crying spells; loss of appetite; feelings of hopelessness or loss of control; fear of touching the baby; over concern or no concern at all about the baby; little or no concern about your own appearance/caring for yourself; and/or inability to sleep or excessive sleeping. Contact your Obstetrician if you are experiencing any of these symptoms     PREVENTING SHAKEN BABY  If you are angry or stressed, PUT THE BABY IN THE CRIB, step away, take some deep breaths, and wait until you are calm to care for the baby. DO NOT SHAKE THE BABY. You are not alone, call a supporter for help.  [facility] 24/7 crisis call line (675-535-6679) or (1-635.841.5197)  You can also text them, text \"ANSWER\" (269921)      "

## 2022-05-26 NOTE — PROGRESS NOTES
"Department of Obstetrics and Gynecology  Post- Progress Note    CC: POD 2 s/p LTCS for NRFS.    S: Pt feeling well.  Pain well controlled.  Natalee reg diet.  Has ambulated.  Lochia light. Voiding w/o difficulty.  Positive flatus/no BM.  Pt denies CP/SOB, N/V, constipation/diarrhea, lower leg pain.  Baby had a fever last night. She is worried about breastfeeding.  really wants to go home.     O: /78   Pulse 69   Temp 36.7 °C (98.1 °F) (Temporal)   Resp 17   Ht 1.676 m (5' 6\")   Wt 91.6 kg (202 lb)   SpO2 98%   Breastfeeding Yes   BMI 32.60 kg/m² , Temp (24hrs), Av.6 °C (97.9 °F), Min:36.2 °C (97.1 °F), Max:37.1 °C (98.7 °F)         Gen: NAD, AAO    Abd: Soft, NT, normoactive BS, no rebound/guarding, fundus firm at U-1.    Incision: clean, dry, intact, with steristrips in place.  No erythema, induration or drainage.    Ext: WWP, +1 edema, non-tender to palpation    Recent Labs     22  0541 22  1933   WBC 16.9* 15.8*   RBC 3.79* 3.76*   HEMOGLOBIN 11.6* 11.7*   HEMATOCRIT 34.6* 34.6*   MCV 91.3 92.0   MCH 30.6 31.1   RDW 45.0 45.3   PLATELETCT 195 236   MPV 11.1 10.4   NEUTSPOLYS  --  69.70   LYMPHOCYTES  --  18.30*   MONOCYTES  --  7.80   EOSINOPHILS  --  3.00   BASOPHILS  --  0.60       A/P: Carlotta Doshi is a 37 y.o.  s/p LTCS.  AVSS.  UOP adequate. Recovering well.    - Continue routine postpartum care. Recommend staying one more day     - Encourage ambulation.    - Continue current pain regimen    - Rh positive.  Rhogam not indicated.    - RubImm    Anticipate d/c POD 3-4    "

## 2022-05-26 NOTE — PROGRESS NOTES
Assessment completed WDL. Pt states that pain is well controlled with current pain medications. Plan of care discussed. Pt encouraged to call with needs.

## 2022-05-26 NOTE — DISCHARGE SUMMARY
Obstetrics Discharge Summary    Admission Date: 2022         Discharge Date: 2022    ADMISSION DIAGNOSIS:  1. Term intrauterine pregnancy at 39+4 weeks.  2. Chronic hypertension.    DISCHARGE DIAGNOSIS:  1. Term intrauterine pregnancy at 39+4 weeks.  2. Chronic hypertension.    DETAILS OF HOSPITAL STAY  Presenting Problem/History of Present Illness: Induction of labor due to single umbilical artery doppler.     Hospital Course:  Patient is a 37 y.o.  who presented to [facility] at 39w4d weeks for a scheduled induction of labor. She had prenatal care with [facility] with Dr. Orellana. Pregnancy was complicated by hypertension.    Patient underwent a primary  section due to non-reassuring fetal heart tracing. For full details of the operation, please refer to the operative report dictation. Briefly, the patient was taken to the operating room where a repeat  section was performed. Bilateral salpingectomy was not preformed. There were no complications. Estimated blood loss was 500 ml. Findings included normal uterus, tubes and ovaries. Patient delivered a viable male infant weighing 7lbs 13oz. with Apgars as below in delivery summary. Patent’s recovery and post-operative courses were unremarkable. By post-operative day #2, the patient met all appropriate milestones and was stable to be discharged to home.    APGARs:   8   9      COMPLICATIONS: none    PHYSICAL EXAM:   Examined by Dr. Watters this a.m. see progress note.  Vitals:   Vitals:    22 1000   BP: 130/89   Pulse: 67   Resp: 17   Temp: 36.8 °C (98.3 °F)   SpO2: 99%       LABS/STUDIES:    Latest Reference Range & Units 22 05:41 22 19:33   WBC 4.8 - 10.8 K/uL 16.9 (H) 15.8 (H)   RBC 4.20 - 5.40 M/uL 3.79 (L) 3.76 (L)   Hemoglobin 12.0 - 16.0 g/dL 11.6 (L) 11.7 (L)   Hematocrit 37.0 - 47.0 % 34.6 (L) 34.6 (L)   MCV 81.4 - 97.8 fL 91.3 92.0   MCH 27.0 - 33.0 pg 30.6 31.1   MCHC 33.6 - 35.0 g/dL 33.5 (L) 33.8    RDW 35.9 - 50.0 fL 45.0 45.3   Platelet Count 164 - 446 K/uL 195 236   MPV 9.0 - 12.9 fL 11.1 10.4     DISPOSITION: Home.    DISCHARGE MEDICATIONS:  - Oxycodone 5 mg PO every 8 hours prn pain x 7 days  - Tylenol 650 mg PO every 6 hours x 7 day  - Ibuprofen 650 mg PO every 6 hours x 7 day  - Colace 100mg PO BID x 30 days    DISCHARGE INSTRUCTIONS:  1. Pelvic rest and no heavy lifting greater than 10 pounds for 6 weeks post-operatively.   2. No driving for at least 2 weeks or longer while requiring pain medication.   3. Incision check in 1 week at [facility] [phone]  4. Post-partum vist in 6 weeks at [facility] [phone]  5. Return to the emergency department if experiencing increased vaginal bleeding, severe pain, temperature greater than 100.4, or any other concerns.    DISCHARGE CONDITION: Stable.    Radha Nolan M.D.

## 2022-05-26 NOTE — LACTATION NOTE
Late Entry-    Observed MOB putting baby to her right breast in the cross cradle position.  Infant calm at the breast when this LC entered the room, but as MOB started to readjust baby's positioning at the breast, infant grew fussy, and would latch onto the breast and then pull away quickly after taking approximately 6-8 sucks.  Unable to assess several bursts of suckle at the breast at one time, but from what was observed by this LC, infant could obtain deep latch where dimpling and smacking were not present.    Breastfeeding Plan:  Continue to offer infant the breast per feeding cues for a minimum of 8 or more feeds in a 24 hour period.  If infant begins to show signs of poor milk transfer or continues to feed with sub-optimal latch, three step feeding plan should be implemented to include: breastfeed attempts, supplementation per hospital supplementation guidelines, and pumping/hand expression.      MOB denied having any additional lactation questions and/or concerns at this time and was encouraged to call for lactation support as needed.

## 2022-05-26 NOTE — PROGRESS NOTES
Report received from ELEONORA Angulo.  Assumed care.    1936 Assessment completed.  Incision open to air with steristrips with no s/s of infection.  Fundus firm, lochia light.  Discussed plan of care with patient.   Patient states infant has been sleepy and reluctant to latch at times. Assisted with unwrapping, waking and latching infant.  Infant was sleepy and difficult to arouse.  MOB concerned about her breast milk supply. Educated MOB on pumping when infant does not latch or have sustain for 10 minutes.  MOB provided with hospital grade breast pump along with verbal and written instructions on pump assembly, pump operation, and cleaning of pump parts.  Fit of 25 mm flanges appeared appropriate at each breast.  Pump settings reviewed and were: 80 CPM down to 60 after 2 minutes/suction set to comfort at 20-30% and to pump for 15 minutes.

## 2022-05-26 NOTE — CARE PLAN
The patient is Stable - Low risk of patient condition declining or worsening    Shift Goals  Clinical Goals: Pain control, ambulation and work on breastfeeding  Patient Goals: Rest  Family Goals: Bond    Progress made toward(s) clinical / shift goals:    Problem: Knowledge Deficit - Postpartum  Goal: Patient will verbalize and demonstrate understanding of self and infant care  Outcome: Progressing  Note: Patient working on breastfeeding and bonding with infant.      Problem: Altered Physiologic Condition  Goal: Patient physiologically stable as evidenced by normal lochia, palpable uterine involution and vitals within normal limits  Outcome: Progressing     Problem: Infection - Postpartum  Goal: Postpartum patient will be free of signs and symptoms of infection  Outcome: Progressing     Problem: Bowel Elimination - Post Surgical  Goal: Patient will resume regular bowel sounds and function with no discomfort or distention  Outcome: Progressing     Problem: Early Mobilization - Post Surgery  Goal: Early mobilization post surgery  Outcome: Progressing

## 2022-08-01 SDOH — ECONOMIC STABILITY: HOUSING INSECURITY
IN THE LAST 12 MONTHS, WAS THERE A TIME WHEN YOU DID NOT HAVE A STEADY PLACE TO SLEEP OR SLEPT IN A SHELTER (INCLUDING NOW)?: NO

## 2022-08-01 SDOH — ECONOMIC STABILITY: HOUSING INSECURITY

## 2022-08-01 SDOH — HEALTH STABILITY: PHYSICAL HEALTH: ON AVERAGE, HOW MANY DAYS PER WEEK DO YOU ENGAGE IN MODERATE TO STRENUOUS EXERCISE (LIKE A BRISK WALK)?: 3 DAYS

## 2022-08-01 SDOH — ECONOMIC STABILITY: TRANSPORTATION INSECURITY
IN THE PAST 12 MONTHS, HAS THE LACK OF TRANSPORTATION KEPT YOU FROM MEDICAL APPOINTMENTS OR FROM GETTING MEDICATIONS?: NO

## 2022-08-01 SDOH — ECONOMIC STABILITY: FOOD INSECURITY: WITHIN THE PAST 12 MONTHS, YOU WORRIED THAT YOUR FOOD WOULD RUN OUT BEFORE YOU GOT MONEY TO BUY MORE.: NEVER TRUE

## 2022-08-01 SDOH — HEALTH STABILITY: PHYSICAL HEALTH: ON AVERAGE, HOW MANY MINUTES DO YOU ENGAGE IN EXERCISE AT THIS LEVEL?: 50 MIN

## 2022-08-01 SDOH — ECONOMIC STABILITY: FOOD INSECURITY: WITHIN THE PAST 12 MONTHS, THE FOOD YOU BOUGHT JUST DIDN'T LAST AND YOU DIDN'T HAVE MONEY TO GET MORE.: NEVER TRUE

## 2022-08-01 SDOH — ECONOMIC STABILITY: INCOME INSECURITY: IN THE LAST 12 MONTHS, WAS THERE A TIME WHEN YOU WERE NOT ABLE TO PAY THE MORTGAGE OR RENT ON TIME?: NO

## 2022-08-01 SDOH — HEALTH STABILITY: MENTAL HEALTH
STRESS IS WHEN SOMEONE FEELS TENSE, NERVOUS, ANXIOUS, OR CAN'T SLEEP AT NIGHT BECAUSE THEIR MIND IS TROUBLED. HOW STRESSED ARE YOU?: NOT AT ALL

## 2022-08-01 SDOH — ECONOMIC STABILITY: INCOME INSECURITY: HOW HARD IS IT FOR YOU TO PAY FOR THE VERY BASICS LIKE FOOD, HOUSING, MEDICAL CARE, AND HEATING?: NOT HARD AT ALL

## 2022-08-01 SDOH — ECONOMIC STABILITY: TRANSPORTATION INSECURITY
IN THE PAST 12 MONTHS, HAS LACK OF TRANSPORTATION KEPT YOU FROM MEETINGS, WORK, OR FROM GETTING THINGS NEEDED FOR DAILY LIVING?: NO

## 2022-08-01 SDOH — ECONOMIC STABILITY: TRANSPORTATION INSECURITY
IN THE PAST 12 MONTHS, HAS LACK OF RELIABLE TRANSPORTATION KEPT YOU FROM MEDICAL APPOINTMENTS, MEETINGS, WORK OR FROM GETTING THINGS NEEDED FOR DAILY LIVING?: NO

## 2022-08-01 ASSESSMENT — SOCIAL DETERMINANTS OF HEALTH (SDOH)
HOW HARD IS IT FOR YOU TO PAY FOR THE VERY BASICS LIKE FOOD, HOUSING, MEDICAL CARE, AND HEATING?: NOT HARD AT ALL
IN A TYPICAL WEEK, HOW MANY TIMES DO YOU TALK ON THE PHONE WITH FAMILY, FRIENDS, OR NEIGHBORS?: MORE THAN THREE TIMES A WEEK
HOW OFTEN DO YOU GET TOGETHER WITH FRIENDS OR RELATIVES?: TWICE A WEEK
HOW OFTEN DO YOU GET TOGETHER WITH FRIENDS OR RELATIVES?: TWICE A WEEK
HOW MANY DRINKS CONTAINING ALCOHOL DO YOU HAVE ON A TYPICAL DAY WHEN YOU ARE DRINKING: 1 OR 2
HOW OFTEN DO YOU ATTENT MEETINGS OF THE CLUB OR ORGANIZATION YOU BELONG TO?: NEVER
IN A TYPICAL WEEK, HOW MANY TIMES DO YOU TALK ON THE PHONE WITH FAMILY, FRIENDS, OR NEIGHBORS?: MORE THAN THREE TIMES A WEEK
HOW OFTEN DO YOU ATTEND CHURCH OR RELIGIOUS SERVICES?: PATIENT DECLINED
HOW OFTEN DO YOU ATTEND CHURCH OR RELIGIOUS SERVICES?: PATIENT DECLINED
HOW OFTEN DO YOU HAVE A DRINK CONTAINING ALCOHOL: 2-3 TIMES A WEEK
DO YOU BELONG TO ANY CLUBS OR ORGANIZATIONS SUCH AS CHURCH GROUPS UNIONS, FRATERNAL OR ATHLETIC GROUPS, OR SCHOOL GROUPS?: NO
HOW OFTEN DO YOU ATTENT MEETINGS OF THE CLUB OR ORGANIZATION YOU BELONG TO?: NEVER
WITHIN THE PAST 12 MONTHS, YOU WORRIED THAT YOUR FOOD WOULD RUN OUT BEFORE YOU GOT THE MONEY TO BUY MORE: NEVER TRUE
HOW OFTEN DO YOU HAVE SIX OR MORE DRINKS ON ONE OCCASION: NEVER
DO YOU BELONG TO ANY CLUBS OR ORGANIZATIONS SUCH AS CHURCH GROUPS UNIONS, FRATERNAL OR ATHLETIC GROUPS, OR SCHOOL GROUPS?: NO

## 2022-08-01 ASSESSMENT — LIFESTYLE VARIABLES
HOW OFTEN DO YOU HAVE A DRINK CONTAINING ALCOHOL: 2-3 TIMES A WEEK
HOW MANY STANDARD DRINKS CONTAINING ALCOHOL DO YOU HAVE ON A TYPICAL DAY: 1 OR 2
AUDIT-C TOTAL SCORE: 3
HOW OFTEN DO YOU HAVE SIX OR MORE DRINKS ON ONE OCCASION: NEVER
SKIP TO QUESTIONS 9-10: 1

## 2022-08-02 ENCOUNTER — OFFICE VISIT (OUTPATIENT)
Dept: MEDICAL GROUP | Facility: LAB | Age: 37
End: 2022-08-02
Payer: COMMERCIAL

## 2022-08-02 VITALS
DIASTOLIC BLOOD PRESSURE: 72 MMHG | WEIGHT: 184.2 LBS | HEART RATE: 68 BPM | HEIGHT: 67 IN | TEMPERATURE: 97.3 F | OXYGEN SATURATION: 100 % | RESPIRATION RATE: 14 BRPM | SYSTOLIC BLOOD PRESSURE: 106 MMHG | BODY MASS INDEX: 28.91 KG/M2

## 2022-08-02 DIAGNOSIS — Z00.00 PREVENTATIVE HEALTH CARE: ICD-10-CM

## 2022-08-02 DIAGNOSIS — M62.838 NECK MUSCLE SPASM: ICD-10-CM

## 2022-08-02 DIAGNOSIS — F41.1 GENERALIZED ANXIETY DISORDER WITH PANIC ATTACKS: ICD-10-CM

## 2022-08-02 DIAGNOSIS — F41.0 GENERALIZED ANXIETY DISORDER WITH PANIC ATTACKS: ICD-10-CM

## 2022-08-02 DIAGNOSIS — K20.0 EOSINOPHILIC ESOPHAGITIS: ICD-10-CM

## 2022-08-02 DIAGNOSIS — E55.9 VITAMIN D DEFICIENCY: ICD-10-CM

## 2022-08-02 PROCEDURE — 99214 OFFICE O/P EST MOD 30 MIN: CPT | Performed by: NURSE PRACTITIONER

## 2022-08-02 RX ORDER — CARISOPRODOL 350 MG/1
350 TABLET ORAL
Qty: 50 TABLET | Refills: 0 | Status: SHIPPED | OUTPATIENT
Start: 2022-08-02 | End: 2022-10-31

## 2022-08-02 RX ORDER — ALPRAZOLAM 1 MG/1
1 TABLET ORAL NIGHTLY PRN
Qty: 30 TABLET | Refills: 0 | Status: SHIPPED | OUTPATIENT
Start: 2022-08-02 | End: 2022-10-31

## 2022-08-02 RX ORDER — OMEPRAZOLE 20 MG/1
20 CAPSULE, DELAYED RELEASE ORAL 2 TIMES DAILY
Qty: 180 CAPSULE | Refills: 3 | Status: SHIPPED | OUTPATIENT
Start: 2022-08-02 | End: 2023-01-10 | Stop reason: SDUPTHER

## 2022-08-02 RX ORDER — OMEPRAZOLE 10 MG/1
10 CAPSULE, DELAYED RELEASE ORAL
COMMUNITY
End: 2022-08-02

## 2022-08-02 RX ORDER — MELATONIN 10 MG
10 CAPSULE ORAL
Status: ON HOLD | COMMUNITY
End: 2024-03-22

## 2022-08-02 ASSESSMENT — FIBROSIS 4 INDEX: FIB4 SCORE: 0.94

## 2022-08-09 ENCOUNTER — HOSPITAL ENCOUNTER (OUTPATIENT)
Dept: LAB | Facility: MEDICAL CENTER | Age: 37
End: 2022-08-09
Attending: NURSE PRACTITIONER
Payer: COMMERCIAL

## 2022-08-09 DIAGNOSIS — E55.9 VITAMIN D DEFICIENCY: ICD-10-CM

## 2022-08-09 DIAGNOSIS — Z00.00 PREVENTATIVE HEALTH CARE: ICD-10-CM

## 2022-08-09 LAB
25(OH)D3 SERPL-MCNC: 54 NG/ML (ref 30–100)
ALBUMIN SERPL BCP-MCNC: 4.9 G/DL (ref 3.2–4.9)
ALBUMIN/GLOB SERPL: 2.1 G/DL
ALP SERPL-CCNC: 70 U/L (ref 30–99)
ALT SERPL-CCNC: 37 U/L (ref 2–50)
ANION GAP SERPL CALC-SCNC: 13 MMOL/L (ref 7–16)
AST SERPL-CCNC: 31 U/L (ref 12–45)
BASOPHILS # BLD AUTO: 1 % (ref 0–1.8)
BASOPHILS # BLD: 0.11 K/UL (ref 0–0.12)
BILIRUB SERPL-MCNC: 0.4 MG/DL (ref 0.1–1.5)
BUN SERPL-MCNC: 14 MG/DL (ref 8–22)
CALCIUM SERPL-MCNC: 9.6 MG/DL (ref 8.5–10.5)
CHLORIDE SERPL-SCNC: 104 MMOL/L (ref 96–112)
CHOLEST SERPL-MCNC: 200 MG/DL (ref 100–199)
CO2 SERPL-SCNC: 22 MMOL/L (ref 20–33)
CREAT SERPL-MCNC: 0.82 MG/DL (ref 0.5–1.4)
EOSINOPHIL # BLD AUTO: 0.76 K/UL (ref 0–0.51)
EOSINOPHIL NFR BLD: 7.2 % (ref 0–6.9)
ERYTHROCYTE [DISTWIDTH] IN BLOOD BY AUTOMATED COUNT: 44 FL (ref 35.9–50)
FASTING STATUS PATIENT QL REPORTED: NORMAL
GFR SERPLBLD CREATININE-BSD FMLA CKD-EPI: 94 ML/MIN/1.73 M 2
GLOBULIN SER CALC-MCNC: 2.3 G/DL (ref 1.9–3.5)
GLUCOSE SERPL-MCNC: 87 MG/DL (ref 65–99)
HCT VFR BLD AUTO: 46.7 % (ref 37–47)
HDLC SERPL-MCNC: 95 MG/DL
HGB BLD-MCNC: 15.3 G/DL (ref 12–16)
IMM GRANULOCYTES # BLD AUTO: 0.04 K/UL (ref 0–0.11)
IMM GRANULOCYTES NFR BLD AUTO: 0.4 % (ref 0–0.9)
LDLC SERPL CALC-MCNC: 94 MG/DL
LYMPHOCYTES # BLD AUTO: 2.38 K/UL (ref 1–4.8)
LYMPHOCYTES NFR BLD: 22.6 % (ref 22–41)
MCH RBC QN AUTO: 29.8 PG (ref 27–33)
MCHC RBC AUTO-ENTMCNC: 32.8 G/DL (ref 33.6–35)
MCV RBC AUTO: 91 FL (ref 81.4–97.8)
MONOCYTES # BLD AUTO: 0.72 K/UL (ref 0–0.85)
MONOCYTES NFR BLD AUTO: 6.8 % (ref 0–13.4)
NEUTROPHILS # BLD AUTO: 6.51 K/UL (ref 2–7.15)
NEUTROPHILS NFR BLD: 62 % (ref 44–72)
NRBC # BLD AUTO: 0 K/UL
NRBC BLD-RTO: 0 /100 WBC
PLATELET # BLD AUTO: 279 K/UL (ref 164–446)
PMV BLD AUTO: 10.2 FL (ref 9–12.9)
POTASSIUM SERPL-SCNC: 4.2 MMOL/L (ref 3.6–5.5)
PROT SERPL-MCNC: 7.2 G/DL (ref 6–8.2)
RBC # BLD AUTO: 5.13 M/UL (ref 4.2–5.4)
SODIUM SERPL-SCNC: 139 MMOL/L (ref 135–145)
TRIGL SERPL-MCNC: 53 MG/DL (ref 0–149)
TSH SERPL DL<=0.005 MIU/L-ACNC: 1.68 UIU/ML (ref 0.38–5.33)
WBC # BLD AUTO: 10.5 K/UL (ref 4.8–10.8)

## 2022-08-09 PROCEDURE — 82306 VITAMIN D 25 HYDROXY: CPT

## 2022-08-09 PROCEDURE — 80053 COMPREHEN METABOLIC PANEL: CPT

## 2022-08-09 PROCEDURE — 85025 COMPLETE CBC W/AUTO DIFF WBC: CPT

## 2022-08-09 PROCEDURE — 36415 COLL VENOUS BLD VENIPUNCTURE: CPT

## 2022-08-09 PROCEDURE — 84443 ASSAY THYROID STIM HORMONE: CPT

## 2022-08-09 PROCEDURE — 80061 LIPID PANEL: CPT

## 2022-08-10 NOTE — ASSESSMENT & PLAN NOTE
This is a chronic condition. Patient is currently taking xanax 1 mg nightly with relief, denies side effects.  Denies suicidal or homicidal ideation.  No complaints at this time.

## 2022-08-10 NOTE — ASSESSMENT & PLAN NOTE
This is a chronic condition. Currently taking omeprazole with relief. Patient denies heartburn, nausea, vomiting, unintentional weight loss, or fatigue at this time.

## 2022-08-10 NOTE — PROGRESS NOTES
"Subjective:     CC:   Chief Complaint   Patient presents with    Annual Exam     Yearly labs     Medication Refill    Medication Management     HPI:   Carlotta presents today with the following:    Eosinophilic esophagitis  This is a chronic condition. Currently taking omeprazole with relief. Patient denies heartburn, nausea, vomiting, unintentional weight loss, or fatigue at this time.    Generalized anxiety disorder with panic attacks  This is a chronic condition. Patient is currently taking xanax 1 mg nightly with relief, denies side effects.  Denies suicidal or homicidal ideation.  No complaints at this time.    ROS:   Gen: no fevers/chills, no changes in weight  Eyes: no changes in vision  ENT: no sore throat, no hearing loss, no bloody nose  Pulm: no sob, no cough  CV: no chest pain, no palpitations  GI: no nausea/vomiting, no diarrhea  : no dysuria  MSk: no myalgias  Skin: no rash  Neuro: no headaches, no numbness/tingling  Heme/Lymph: no easy bruising        - NOTE: All other systems reviewed and are negative, except as in HPI.    Objective:     Exam: /72 (BP Location: Right arm, Patient Position: Sitting, BP Cuff Size: Adult)   Pulse 68   Temp 36.3 °C (97.3 °F)   Resp 14   Ht 1.702 m (5' 7\")   Wt 83.6 kg (184 lb 3.2 oz)   SpO2 100%  Body mass index is 28.85 kg/m².    General: Normal appearing. No distress.  HEENT: Normocephalic. Eyes conjunctiva clear lids without ptosis, pupils equal and reactive to light accommodation, ears normal shape and contour, canals are clear bilaterally, tympanic membranes are benign, nasal mucosa benign, oropharynx is without erythema, edema or exudates.   Neck: Supple without JVD or bruit. Thyroid is not enlarged.  Pulmonary: Clear to ausculation.  Normal effort. No rales, ronchi, or wheezing.  Cardiovascular: Regular rate and rhythm without murmur. Carotid and radial pulses are intact and equal bilaterally.  Neurologic: Grossly nonfocal  Lymph: No cervical or " supraclavicular lymph nodes are palpable  Skin: Warm and dry.  No obvious lesions.  Musculoskeletal: Normal gait. No extremity cyanosis, clubbing, or edema.  Psych: Normal mood and affect. Alert and oriented x3. Judgment and insight is normal.    Assessment & Plan:     37 y.o. female with the following -     1. Vitamin D deficiency  Labs ordered.   - VITAMIN D,25 HYDROXY; Future    2. Preventative health care  Labs ordered.   - Lipid Profile; Future  - TSH WITH REFLEX TO FT4; Future  - CBC WITH DIFFERENTIAL; Future  - Comp Metabolic Panel; Future    3. Generalized anxiety disorder with panic attacks  Patient is feeling well on current medications.  Will continue.  Denies any suicidal or homicidal ideation. Discussed that should the patient have any symptoms they should call suicide prevention hotline or report to the emergency room immediately. Emphasized importance of healthy diet and exercise.    Obtained and reviewed patient utilization report from [facility] pharmacy database on 8/2/2022 3:00 PM  prior to writing prescription for controlled substance II, III or IV per [place] bill . Based on assessment of the report, the prescription is medically necessary.   - ALPRAZolam (XANAX) 1 MG Tab; Take 1 Tablet by mouth at bedtime as needed for Sleep for up to 90 days.  Dispense: 30 Tablet; Refill: 0    4. Eosinophilic esophagitis  This is a chronic and stable problem.  Patient is doing well.  No red flags.  Continue PPI and monitor.  - omeprazole (PRILOSEC) 20 MG delayed-release capsule; Take 1 Capsule by mouth 2 times a day.  Dispense: 180 Capsule; Refill: 3    5. Neck muscle spasm  Refill provided.   - carisoprodol (SOMA) 350 MG Tab; Take 1 Tablet by mouth 1 time a day as needed for Muscle Spasms for up to 90 days.  Dispense: 50 Tablet; Refill: 0

## 2022-11-01 ENCOUNTER — TELEMEDICINE (OUTPATIENT)
Dept: MEDICAL GROUP | Facility: LAB | Age: 37
End: 2022-11-01
Payer: COMMERCIAL

## 2022-11-01 VITALS — BODY MASS INDEX: 26.53 KG/M2 | HEIGHT: 67 IN | WEIGHT: 169 LBS

## 2022-11-01 DIAGNOSIS — F41.0 GENERALIZED ANXIETY DISORDER WITH PANIC ATTACKS: ICD-10-CM

## 2022-11-01 DIAGNOSIS — F41.1 GENERALIZED ANXIETY DISORDER WITH PANIC ATTACKS: ICD-10-CM

## 2022-11-01 DIAGNOSIS — M62.838 NECK MUSCLE SPASM: ICD-10-CM

## 2022-11-01 PROCEDURE — 99214 OFFICE O/P EST MOD 30 MIN: CPT | Mod: 95 | Performed by: FAMILY MEDICINE

## 2022-11-01 RX ORDER — CARISOPRODOL 350 MG/1
350 TABLET ORAL
Qty: 50 TABLET | Refills: 0 | Status: SHIPPED | OUTPATIENT
Start: 2022-11-01 | End: 2023-01-10 | Stop reason: SDUPTHER

## 2022-11-01 RX ORDER — ALPRAZOLAM 1 MG/1
1 TABLET ORAL
Qty: 30 TABLET | Refills: 0 | Status: SHIPPED | OUTPATIENT
Start: 2022-11-01 | End: 2023-01-10 | Stop reason: SDUPTHER

## 2022-11-01 RX ORDER — FLUCONAZOLE 150 MG/1
TABLET ORAL
COMMUNITY
Start: 2022-10-30 | End: 2022-11-01

## 2022-11-01 RX ORDER — CLINDAMYCIN PHOSPHATE 10 MG/ML
SOLUTION TOPICAL
COMMUNITY
Start: 2022-10-30 | End: 2022-11-01

## 2022-11-01 ASSESSMENT — FIBROSIS 4 INDEX: FIB4 SCORE: 0.68
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
## 2023-01-10 ENCOUNTER — OFFICE VISIT (OUTPATIENT)
Dept: MEDICAL GROUP | Facility: LAB | Age: 38
End: 2023-01-10
Payer: COMMERCIAL

## 2023-01-10 VITALS
HEART RATE: 74 BPM | WEIGHT: 169 LBS | SYSTOLIC BLOOD PRESSURE: 120 MMHG | DIASTOLIC BLOOD PRESSURE: 72 MMHG | BODY MASS INDEX: 26.53 KG/M2 | OXYGEN SATURATION: 100 % | HEIGHT: 67 IN

## 2023-01-10 DIAGNOSIS — F41.1 GENERALIZED ANXIETY DISORDER WITH PANIC ATTACKS: ICD-10-CM

## 2023-01-10 DIAGNOSIS — M62.838 NECK MUSCLE SPASM: ICD-10-CM

## 2023-01-10 DIAGNOSIS — K20.0 EOSINOPHILIC ESOPHAGITIS: ICD-10-CM

## 2023-01-10 DIAGNOSIS — J02.9 PHARYNGITIS, UNSPECIFIED ETIOLOGY: ICD-10-CM

## 2023-01-10 DIAGNOSIS — F41.0 GENERALIZED ANXIETY DISORDER WITH PANIC ATTACKS: ICD-10-CM

## 2023-01-10 PROCEDURE — 99214 OFFICE O/P EST MOD 30 MIN: CPT | Performed by: INTERNAL MEDICINE

## 2023-01-10 RX ORDER — ALPRAZOLAM 1 MG/1
1 TABLET ORAL
Qty: 30 TABLET | Refills: 0 | Status: SHIPPED | OUTPATIENT
Start: 2023-01-10 | End: 2023-03-15 | Stop reason: SDUPTHER

## 2023-01-10 RX ORDER — CARISOPRODOL 350 MG/1
350 TABLET ORAL
Qty: 50 TABLET | Refills: 0 | Status: SHIPPED | OUTPATIENT
Start: 2023-01-10 | End: 2023-03-15 | Stop reason: SDUPTHER

## 2023-01-10 RX ORDER — OMEPRAZOLE 20 MG/1
20 CAPSULE, DELAYED RELEASE ORAL 2 TIMES DAILY
Qty: 180 CAPSULE | Refills: 3 | Status: SHIPPED | OUTPATIENT
Start: 2023-01-10

## 2023-01-10 RX ORDER — AMOXICILLIN AND CLAVULANATE POTASSIUM 875; 125 MG/1; MG/1
1 TABLET, FILM COATED ORAL 2 TIMES DAILY
Qty: 14 TABLET | Refills: 0 | Status: SHIPPED | OUTPATIENT
Start: 2023-01-10 | End: 2023-03-15

## 2023-01-10 ASSESSMENT — FIBROSIS 4 INDEX: FIB4 SCORE: 0.68

## 2023-01-11 NOTE — PROGRESS NOTES
CC: Carlotta Doshi is a 37 y.o. female is suffering from   Chief Complaint   Patient presents with    Pharyngitis         SUBJECTIVE:  1. Generalized anxiety disorder with panic attacks  Patient is here for follow-up suffer from generalized anxiety disorder we will continue Xanax    2. Neck muscle spasm  Neck muscle spasm continue Soma    3. Eosinophilic esophagitis  Continue omeprazole    4. Pharyngitis, unspecified etiology  Start Augmentin  - amoxicillin-clavulanate (AUGMENTIN) 875-125 MG Tab; Take 1 Tablet by mouth 2 times a day.  Dispense: 14 Tablet; Refill: 0      Past social, family, history: , 2-year-old child  Social History     Tobacco Use    Smoking status: Never    Smokeless tobacco: Never   Vaping Use    Vaping Use: Never used   Substance Use Topics    Alcohol use: Yes     Alcohol/week: 1.8 oz     Types: 3 Glasses of wine per week     Comment: Socially    Drug use: No         MEDICATIONS:    Current Outpatient Medications:     ALPRAZolam (XANAX) 1 MG Tab, Take 1 Tablet by mouth 1 time a day as needed for Anxiety for up to 90 days., Disp: 30 Tablet, Rfl: 0    carisoprodol (SOMA) 350 MG Tab, Take 1 Tablet by mouth 1 time a day as needed for Muscle Spasms for up to 90 days., Disp: 50 Tablet, Rfl: 0    omeprazole (PRILOSEC) 20 MG delayed-release capsule, Take 1 Capsule by mouth 2 times a day., Disp: 180 Capsule, Rfl: 3    amoxicillin-clavulanate (AUGMENTIN) 875-125 MG Tab, Take 1 Tablet by mouth 2 times a day., Disp: 14 Tablet, Rfl: 0    Probiotic Product (PROBIOTIC-10 PO), Take  by mouth., Disp: , Rfl:     Melatonin 10 MG Cap, 10 mg., Disp: , Rfl:     Prenatal MV-Min-Fe Fum-FA-DHA (PRENATAL 1 PO), Take  by mouth., Disp: , Rfl:     PROBLEMS:  Patient Active Problem List    Diagnosis Date Noted    Generalized anxiety disorder with panic attacks 03/10/2021    Eosinophilic esophagitis 02/23/2017       REVIEW OF SYSTEMS:  Gen.:  No Nausea, Vomiting, fever, Chills.  Heart: No chest pain.  Lungs:  " No shortness of Breath.  Psychological: Sergey unusual Anxiety depression     PHYSICAL EXAM   Constitutional: Alert, cooperative, not in acute distress.  Cardiovascular:  Rate Rhythm is regular without murmurs rubs clicks.     Thorax & Lungs: Clear to auscultation, no wheezing, rhonchi, or rales  HENT: Normocephalic, Atraumatic.  Posterior oropharynx red inflamed  Eyes: PERRLA, EOMI, Conjunctiva normal.   Neck: Trachia is midline no swelling of the thyroid.   Lymphatic: No lymphadenopathy noted.   Musculoskeletal: Continued complaints of pain right side approximately the C2-C3 region in the cervical spine  Neurologic: Alert & oriented x 3, cranial nerves II through XII are intact, Normal motor function, Normal sensory function, No focal deficits noted.   Psychiatric: Affect normal, Judgment normal, Mood normal.     VITAL SIGNS:/72   Pulse 74   Ht 1.702 m (5' 7\")   Wt 76.7 kg (169 lb)   SpO2 100%   BMI 26.47 kg/m²     Labs: Reviewed    Assessment:                                                     Plan:    1. Generalized anxiety disorder with panic attacks  Continue Xanax state drug task force reviewed  - ALPRAZolam (XANAX) 1 MG Tab; Take 1 Tablet by mouth 1 time a day as needed for Anxiety for up to 90 days.  Dispense: 30 Tablet; Refill: 0  - amoxicillin-clavulanate (AUGMENTIN) 875-125 MG Tab; Take 1 Tablet by mouth 2 times a day.  Dispense: 14 Tablet; Refill: 0    2. Neck muscle spasm  Continue Soma  - carisoprodol (SOMA) 350 MG Tab; Take 1 Tablet by mouth 1 time a day as needed for Muscle Spasms for up to 90 days.  Dispense: 50 Tablet; Refill: 0  - amoxicillin-clavulanate (AUGMENTIN) 875-125 MG Tab; Take 1 Tablet by mouth 2 times a day.  Dispense: 14 Tablet; Refill: 0    3. Eosinophilic esophagitis  Prescription for omeprazole refilled  - omeprazole (PRILOSEC) 20 MG delayed-release capsule; Take 1 Capsule by mouth 2 times a day.  Dispense: 180 Capsule; Refill: 3  - amoxicillin-clavulanate (AUGMENTIN) " 875-125 MG Tab; Take 1 Tablet by mouth 2 times a day.  Dispense: 14 Tablet; Refill: 0    4. Pharyngitis, unspecified etiology  Start Augmentin  - amoxicillin-clavulanate (AUGMENTIN) 875-125 MG Tab; Take 1 Tablet by mouth 2 times a day.  Dispense: 14 Tablet; Refill: 0

## 2023-02-14 NOTE — PATIENT INSTRUCTIONS
Condition stable, O2 sat monitor given to patient, explanation given and patient understands its use.Discharged home.     One tablet once daily in the morning for 1 week; at week 2, increase to 1 tablet twice daily administered in the morning and evening and continue for 1 week; at week 3, increase to 2 tablets in the morning and 1 tablet in the evening and continue for 1 week; at week 4, increase to 2 tablets twice daily administered in the morning and evening and continue for the remainder of the treatment course.

## 2023-03-15 ENCOUNTER — TELEMEDICINE (OUTPATIENT)
Dept: MEDICAL GROUP | Facility: LAB | Age: 38
End: 2023-03-15
Payer: COMMERCIAL

## 2023-03-15 VITALS — WEIGHT: 166 LBS | BODY MASS INDEX: 26.06 KG/M2 | HEIGHT: 67 IN

## 2023-03-15 DIAGNOSIS — T75.3XXA MOTION SICKNESS, INITIAL ENCOUNTER: ICD-10-CM

## 2023-03-15 DIAGNOSIS — F41.1 GENERALIZED ANXIETY DISORDER WITH PANIC ATTACKS: ICD-10-CM

## 2023-03-15 DIAGNOSIS — M62.838 NECK MUSCLE SPASM: ICD-10-CM

## 2023-03-15 DIAGNOSIS — F41.0 GENERALIZED ANXIETY DISORDER WITH PANIC ATTACKS: ICD-10-CM

## 2023-03-15 PROCEDURE — 99214 OFFICE O/P EST MOD 30 MIN: CPT | Mod: 95 | Performed by: FAMILY MEDICINE

## 2023-03-15 RX ORDER — ONDANSETRON 4 MG/1
4 TABLET, ORALLY DISINTEGRATING ORAL EVERY 6 HOURS PRN
Qty: 30 TABLET | Refills: 1 | Status: SHIPPED | OUTPATIENT
Start: 2023-03-15 | End: 2023-08-08

## 2023-03-15 RX ORDER — ALPRAZOLAM 1 MG/1
1 TABLET ORAL
Qty: 30 TABLET | Refills: 0 | Status: SHIPPED | OUTPATIENT
Start: 2023-04-07 | End: 2023-07-06

## 2023-03-15 RX ORDER — FLUOROMETHOLONE 0.1 %
SUSPENSION, DROPS(FINAL DOSAGE FORM)(ML) OPHTHALMIC (EYE)
COMMUNITY
Start: 2023-03-14 | End: 2023-08-08

## 2023-03-15 RX ORDER — CARISOPRODOL 350 MG/1
350 TABLET ORAL
Qty: 50 TABLET | Refills: 0 | Status: SHIPPED | OUTPATIENT
Start: 2023-04-07 | End: 2023-07-06

## 2023-03-15 ASSESSMENT — FIBROSIS 4 INDEX: FIB4 SCORE: 0.69

## 2023-03-15 ASSESSMENT — PATIENT HEALTH QUESTIONNAIRE - PHQ9: CLINICAL INTERPRETATION OF PHQ2 SCORE: 0
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
## 2023-04-01 ENCOUNTER — HOSPITAL ENCOUNTER (EMERGENCY)
Facility: MEDICAL CENTER | Age: 38
End: 2023-04-01
Attending: EMERGENCY MEDICINE
Payer: COMMERCIAL

## 2023-04-01 VITALS
TEMPERATURE: 97.2 F | DIASTOLIC BLOOD PRESSURE: 62 MMHG | RESPIRATION RATE: 18 BRPM | WEIGHT: 167.7 LBS | HEART RATE: 85 BPM | SYSTOLIC BLOOD PRESSURE: 100 MMHG | HEIGHT: 66 IN | OXYGEN SATURATION: 99 % | BODY MASS INDEX: 26.95 KG/M2

## 2023-04-01 DIAGNOSIS — R10.13 EPIGASTRIC ABDOMINAL PAIN: ICD-10-CM

## 2023-04-01 DIAGNOSIS — R11.10 INTRACTABLE VOMITING: ICD-10-CM

## 2023-04-01 LAB
ALBUMIN SERPL BCP-MCNC: 4.6 G/DL (ref 3.2–4.9)
ALBUMIN/GLOB SERPL: 1.5 G/DL
ALP SERPL-CCNC: 96 U/L (ref 30–99)
ALT SERPL-CCNC: 14 U/L (ref 2–50)
ANION GAP SERPL CALC-SCNC: 13 MMOL/L (ref 7–16)
APPEARANCE UR: CLEAR
AST SERPL-CCNC: 17 U/L (ref 12–45)
BACTERIA #/AREA URNS HPF: ABNORMAL /HPF
BASOPHILS # BLD AUTO: 0.3 % (ref 0–1.8)
BASOPHILS # BLD: 0.05 K/UL (ref 0–0.12)
BILIRUB SERPL-MCNC: 0.4 MG/DL (ref 0.1–1.5)
BILIRUB UR QL STRIP.AUTO: ABNORMAL
BUN SERPL-MCNC: 14 MG/DL (ref 8–22)
CALCIUM ALBUM COR SERPL-MCNC: 9.3 MG/DL (ref 8.5–10.5)
CALCIUM SERPL-MCNC: 9.8 MG/DL (ref 8.4–10.2)
CHLORIDE SERPL-SCNC: 104 MMOL/L (ref 96–112)
CO2 SERPL-SCNC: 21 MMOL/L (ref 20–33)
COLOR UR: YELLOW
CREAT SERPL-MCNC: 0.83 MG/DL (ref 0.5–1.4)
EOSINOPHIL # BLD AUTO: 0.14 K/UL (ref 0–0.51)
EOSINOPHIL NFR BLD: 0.9 % (ref 0–6.9)
EPI CELLS #/AREA URNS HPF: ABNORMAL /HPF
ERYTHROCYTE [DISTWIDTH] IN BLOOD BY AUTOMATED COUNT: 38.8 FL (ref 35.9–50)
GFR SERPLBLD CREATININE-BSD FMLA CKD-EPI: 92 ML/MIN/1.73 M 2
GLOBULIN SER CALC-MCNC: 3.1 G/DL (ref 1.9–3.5)
GLUCOSE SERPL-MCNC: 139 MG/DL (ref 65–99)
GLUCOSE UR STRIP.AUTO-MCNC: NEGATIVE MG/DL
HCG SERPL QL: NEGATIVE
HCT VFR BLD AUTO: 47.4 % (ref 37–47)
HGB BLD-MCNC: 16.2 G/DL (ref 12–16)
IMM GRANULOCYTES # BLD AUTO: 0.07 K/UL (ref 0–0.11)
IMM GRANULOCYTES NFR BLD AUTO: 0.5 % (ref 0–0.9)
KETONES UR STRIP.AUTO-MCNC: 40 MG/DL
LEUKOCYTE ESTERASE UR QL STRIP.AUTO: ABNORMAL
LIPASE SERPL-CCNC: 48 U/L (ref 7–58)
LYMPHOCYTES # BLD AUTO: 0.68 K/UL (ref 1–4.8)
LYMPHOCYTES NFR BLD: 4.4 % (ref 22–41)
MCH RBC QN AUTO: 28.9 PG (ref 27–33)
MCHC RBC AUTO-ENTMCNC: 34.2 G/DL (ref 33.6–35)
MCV RBC AUTO: 84.5 FL (ref 81.4–97.8)
MICRO URNS: ABNORMAL
MONOCYTES # BLD AUTO: 0.8 K/UL (ref 0–0.85)
MONOCYTES NFR BLD AUTO: 5.2 % (ref 0–13.4)
MUCOUS THREADS #/AREA URNS HPF: ABNORMAL /HPF
NEUTROPHILS # BLD AUTO: 13.7 K/UL (ref 2–7.15)
NEUTROPHILS NFR BLD: 88.7 % (ref 44–72)
NITRITE UR QL STRIP.AUTO: NEGATIVE
NRBC # BLD AUTO: 0 K/UL
NRBC BLD-RTO: 0 /100 WBC
PH UR STRIP.AUTO: 7.5 [PH] (ref 5–8)
PLATELET # BLD AUTO: 305 K/UL (ref 164–446)
PMV BLD AUTO: 9.9 FL (ref 9–12.9)
POTASSIUM SERPL-SCNC: 4.1 MMOL/L (ref 3.6–5.5)
PROT SERPL-MCNC: 7.7 G/DL (ref 6–8.2)
PROT UR QL STRIP: NEGATIVE MG/DL
RBC # BLD AUTO: 5.61 M/UL (ref 4.2–5.4)
RBC # URNS HPF: ABNORMAL /HPF
RBC UR QL AUTO: ABNORMAL
SODIUM SERPL-SCNC: 138 MMOL/L (ref 135–145)
SP GR UR STRIP.AUTO: 1.01
WBC # BLD AUTO: 15.4 K/UL (ref 4.8–10.8)
WBC #/AREA URNS HPF: ABNORMAL /HPF

## 2023-04-01 PROCEDURE — 700111 HCHG RX REV CODE 636 W/ 250 OVERRIDE (IP): Performed by: EMERGENCY MEDICINE

## 2023-04-01 PROCEDURE — 96375 TX/PRO/DX INJ NEW DRUG ADDON: CPT

## 2023-04-01 PROCEDURE — 84703 CHORIONIC GONADOTROPIN ASSAY: CPT

## 2023-04-01 PROCEDURE — 83690 ASSAY OF LIPASE: CPT

## 2023-04-01 PROCEDURE — 96374 THER/PROPH/DIAG INJ IV PUSH: CPT

## 2023-04-01 PROCEDURE — 85025 COMPLETE CBC W/AUTO DIFF WBC: CPT

## 2023-04-01 PROCEDURE — 700105 HCHG RX REV CODE 258: Performed by: EMERGENCY MEDICINE

## 2023-04-01 PROCEDURE — 36415 COLL VENOUS BLD VENIPUNCTURE: CPT

## 2023-04-01 PROCEDURE — 99285 EMERGENCY DEPT VISIT HI MDM: CPT

## 2023-04-01 PROCEDURE — 81001 URINALYSIS AUTO W/SCOPE: CPT

## 2023-04-01 PROCEDURE — 80053 COMPREHEN METABOLIC PANEL: CPT

## 2023-04-01 RX ORDER — DIPHENHYDRAMINE HYDROCHLORIDE 50 MG/ML
12.5 INJECTION INTRAMUSCULAR; INTRAVENOUS ONCE
Status: COMPLETED | OUTPATIENT
Start: 2023-04-01 | End: 2023-04-01

## 2023-04-01 RX ORDER — PROCHLORPERAZINE MALEATE 10 MG
10 TABLET ORAL EVERY 6 HOURS PRN
Qty: 20 TABLET | Refills: 0 | Status: SHIPPED | OUTPATIENT
Start: 2023-04-01 | End: 2023-08-08

## 2023-04-01 RX ORDER — SODIUM CHLORIDE 9 MG/ML
1000 INJECTION, SOLUTION INTRAVENOUS ONCE
Status: COMPLETED | OUTPATIENT
Start: 2023-04-01 | End: 2023-04-01

## 2023-04-01 RX ORDER — PROCHLORPERAZINE 25 MG
25 SUPPOSITORY, RECTAL RECTAL EVERY 6 HOURS PRN
Qty: 10 SUPPOSITORY | Refills: 0 | Status: SHIPPED | OUTPATIENT
Start: 2023-04-01 | End: 2023-08-08

## 2023-04-01 RX ORDER — PROCHLORPERAZINE EDISYLATE 5 MG/ML
10 INJECTION INTRAMUSCULAR; INTRAVENOUS ONCE
Status: COMPLETED | OUTPATIENT
Start: 2023-04-01 | End: 2023-04-01

## 2023-04-01 RX ADMIN — DIPHENHYDRAMINE HYDROCHLORIDE 12.5 MG: 50 INJECTION INTRAMUSCULAR; INTRAVENOUS at 21:40

## 2023-04-01 RX ADMIN — PROCHLORPERAZINE EDISYLATE 10 MG: 5 INJECTION INTRAMUSCULAR; INTRAVENOUS at 21:40

## 2023-04-01 RX ADMIN — SODIUM CHLORIDE 1000 ML: 9 INJECTION, SOLUTION INTRAVENOUS at 21:40

## 2023-04-01 ASSESSMENT — FIBROSIS 4 INDEX: FIB4 SCORE: 0.69

## 2023-04-02 NOTE — ED PROVIDER NOTES
ED Provider Note    Scribed for Dennis Gooden M.D. by Rachel Mann. 4/1/2023  9:23 PM    Primary care provider: Tim Sheridan M.D.  Means of arrival: walkin    CHIEF COMPLAINT  Chief Complaint   Patient presents with    Abdominal Pain    N/V     Pt report started 6 hours ago started epigastric pain, nausea and vomiting a lot of times, feeling cold and chills.        LIMITATION TO HISTORY   Select: none    HPI    Carlotta Doshi is a 38 y.o. female who presents to the Emergency Department for uncontrolled vomiting onset a few hours ago. Patient had just come back from a conference in [place]. She started to become nauseous about six hours ago and started to dry heave. She took a zofran with no improvement and dry heaving continued. This was accompanied by actual emesis. Emesis started with food from when she ate a waffle, toast, vegetables, and coffee. Since then she has had about 40 episodes of emesis, most of them containing yellow bile and white foam. There was some noted blood in emesis, but she believes this is from a bloody nose earlier today. Associated epigastric pain that is rated 5/10, but 7/10 when she vomits. She has a history of GI issues, but these were mostly resolved in with gallbladder removal 15 years ago. She will have some GI issues sporadically with fatty foods but this usually resolves quickly. She has never felt her symptoms today before. She did not eat anything suspicious during her conference and was not outside. Denies dysuria, urgency, flank pain, or fever. Does not take naproxen or NSAIDS. Denies cannabis use. Drank half a beer last night, but no heavy alcohol use. No history of ulcers or kidney stones.    OUTSIDE HISTORIAN(S):  Select:  provided some history about the severity of her emesis    EXTERNAL RECORDS REVIEWED  Select: Outpatient progress note from January 10 of this year reviewed and patient had anxiety disorder, neck muscle spasm, eosinophilic  esophagitis and a pharyngitis    REVIEW OF SYSTEMS  Pertinent positives include: abdominal pain, nausea, vomiting.  Pertinent negatives include: dysuria, urgency, flank pain, fever.      PAST MEDICAL HISTORY  Past Medical History:   Diagnosis Date    Eosinophilic esophagitis 2017    Panic attack     Psychiatric disorder     anxiety    Sebaceous cyst of left eyelid 2017    Seborrheic keratosis 2017       FAMILY HISTORY  Family History   Problem Relation Age of Onset    Thyroid Mother     Hypertension Mother     Hyperlipidemia Mother     Cancer Father         basal     Thyroid Paternal Aunt     Thyroid Maternal Grandmother     Heart Disease Paternal Grandfather         MI        SOCIAL HISTORY  Social History     Tobacco Use    Smoking status: Never    Smokeless tobacco: Never   Vaping Use    Vaping Use: Never used   Substance Use Topics    Alcohol use: Yes     Alcohol/week: 1.8 oz     Types: 3 Glasses of wine per week     Comment: Socially    Drug use: No     Social History     Substance and Sexual Activity   Drug Use No       SURGICAL HISTORY  Past Surgical History:   Procedure Laterality Date    PRIMARY C SECTION Bilateral 2022    Procedure:  SECTION, PRIMARY;  Surgeon: Twila Smiley M.D.;  Location: SURGERY LABOR AND DELIVERY;  Service: Obstetrics    DILATION AND EVACUATION N/A 2020    Procedure: DILATION AND EVACUATION, UTERUS;  Surgeon: Mikayla Orellana M.D.;  Location: SURGERY SAME DAY [facility] ORS;  Service: Gynecology    APPENDECTOMY      CHOLECYSTECTOMY      DILATION AND CURETTAGE  2020, 2020       CURRENT MEDICATIONS  No current facility-administered medications for this encounter.    Current Outpatient Medications:     fluorometholone (FML) 0.1 % Suspension, , Disp: , Rfl:     ondansetron (ZOFRAN ODT) 4 MG TABLET DISPERSIBLE, Take 1 Tablet by mouth every 6 hours as needed for Nausea/Vomiting., Disp: 30 Tablet, Rfl: 1    [START ON 2023] carisoprodol (SOMA)  "350 MG Tab, Take 1 Tablet by mouth 1 time a day as needed for Muscle Spasms for up to 90 days., Disp: 50 Tablet, Rfl: 0    [START ON 4/7/2023] ALPRAZolam (XANAX) 1 MG Tab, Take 1 Tablet by mouth 1 time a day as needed for Anxiety for up to 90 days., Disp: 30 Tablet, Rfl: 0    omeprazole (PRILOSEC) 20 MG delayed-release capsule, Take 1 Capsule by mouth 2 times a day., Disp: 180 Capsule, Rfl: 3    Probiotic Product (PROBIOTIC-10 PO), Take  by mouth., Disp: , Rfl:     Melatonin 10 MG Cap, 10 mg., Disp: , Rfl:     Prenatal MV-Min-Fe Fum-FA-DHA (PRENATAL 1 PO), Take  by mouth., Disp: , Rfl:     ALLERGIES  Allergies   Allergen Reactions    Codeine Unspecified    Hydrocodone      nausea       PHYSICAL EXAM  VITAL SIGNS: /60   Pulse 87   Temp 36.3 °C (97.3 °F) (Temporal)   Resp 18   Ht 1.676 m (5' 6\")   Wt 76.1 kg (167 lb 11.2 oz)   SpO2 97%   BMI 27.07 kg/m²   Reviewed and afebrile  Constitutional: Well developed, Well nourished,.  Appears nauseated  HENT: Normocephalic, atraumatic, bilateral external ears normal, No intraoral erythema, edema, exudate  Eyes: PERRLA, conjunctiva pink, no scleral icterus.   Cardiovascular: Regular rate and rhythm. No murmurs, rubs or gallops.  No dependent edema or calf tenderness  Respiratory: Lungs clear to auscultation bilaterally. No wheezes, rales, or rhonchi.  Abdominal:  minimal epigastric tenderness, Abdomen soft, non distended. No rebound, or guarding.    Skin: No erythema, no rash. No wounds or bruising.  Genitourinary: No costovertebral angle tenderness.   Musculoskeletal: no deformities.   Neurologic: Alert & oriented x 3, cranial nerves 2-12 intact by passive exam.  No focal deficit noted.  Psychiatric: Affect normal, Judgment normal, Mood normal.     LABS Ordered and Reviewed by Me:  Results for orders placed or performed during the hospital encounter of 04/01/23   CBC WITH DIFFERENTIAL   Result Value Ref Range    WBC 15.4 (H) 4.8 - 10.8 K/uL    RBC 5.61 (H) 4.20 - " 5.40 M/uL    Hemoglobin 16.2 (H) 12.0 - 16.0 g/dL    Hematocrit 47.4 (H) 37.0 - 47.0 %    MCV 84.5 81.4 - 97.8 fL    MCH 28.9 27.0 - 33.0 pg    MCHC 34.2 33.6 - 35.0 g/dL    RDW 38.8 35.9 - 50.0 fL    Platelet Count 305 164 - 446 K/uL    MPV 9.9 9.0 - 12.9 fL    Neutrophils-Polys 88.70 (H) 44.00 - 72.00 %    Lymphocytes 4.40 (L) 22.00 - 41.00 %    Monocytes 5.20 0.00 - 13.40 %    Eosinophils 0.90 0.00 - 6.90 %    Basophils 0.30 0.00 - 1.80 %    Immature Granulocytes 0.50 0.00 - 0.90 %    Nucleated RBC 0.00 /100 WBC    Neutrophils (Absolute) 13.70 (H) 2.00 - 7.15 K/uL    Lymphs (Absolute) 0.68 (L) 1.00 - 4.80 K/uL    Monos (Absolute) 0.80 0.00 - 0.85 K/uL    Eos (Absolute) 0.14 0.00 - 0.51 K/uL    Baso (Absolute) 0.05 0.00 - 0.12 K/uL    Immature Granulocytes (abs) 0.07 0.00 - 0.11 K/uL    NRBC (Absolute) 0.00 K/uL   Comp Metabolic Panel   Result Value Ref Range    Sodium 138 135 - 145 mmol/L    Potassium 4.1 3.6 - 5.5 mmol/L    Chloride 104 96 - 112 mmol/L    Co2 21 20 - 33 mmol/L    Anion Gap 13.0 7.0 - 16.0    Glucose 139 (H) 65 - 99 mg/dL    Bun 14 8 - 22 mg/dL    Creatinine 0.83 0.50 - 1.40 mg/dL    Calcium 9.8 8.4 - 10.2 mg/dL    AST(SGOT) 17 12 - 45 U/L    ALT(SGPT) 14 2 - 50 U/L    Alkaline Phosphatase 96 30 - 99 U/L    Total Bilirubin 0.4 0.1 - 1.5 mg/dL    Albumin 4.6 3.2 - 4.9 g/dL    Total Protein 7.7 6.0 - 8.2 g/dL    Globulin 3.1 1.9 - 3.5 g/dL    A-G Ratio 1.5 g/dL   LIPASE   Result Value Ref Range    Lipase 48 7 - 58 U/L   URINALYSIS    Specimen: Blood   Result Value Ref Range    Color Yellow     Character Clear     Specific Gravity 1.015 <1.035    Ph 7.5 5.0 - 8.0    Glucose Negative Negative mg/dL    Ketones 40 (A) Negative mg/dL    Protein Negative Negative mg/dL    Bilirubin Small (A) Negative    Nitrite Negative Negative    Leukocyte Esterase Trace (A) Negative    Occult Blood Moderate (A) Negative    Micro Urine Req Microscopic    HCG QUAL SERUM   Result Value Ref Range    Beta-Hcg Qualitative  Serum Negative Negative   URINE MICROSCOPIC (W/UA)   Result Value Ref Range    WBC 2-5 /hpf    RBC 2-5 (A) /hpf    Bacteria Moderate (A) None /hpf    Epithelial Cells Few Few /hpf    Mucous Threads Moderate /hpf   CORRECTED CALCIUM   Result Value Ref Range    Correct Calcium 9.3 8.5 - 10.5 mg/dL   ESTIMATED GFR   Result Value Ref Range    GFR (CKD-EPI) 92 >60 mL/min/1.73 m 2       ED COURSE:  9:23 PM - Patient seen and examined at bedside.     ED Observation Status? Yes; Patient placed in observation status at 9:23 PM 04/01/23 for uncontrolled vomiting    INTERVENTIONS BY ME:  Medications   NS infusion 1,000 mL (1,000 mL Intravenous New Bag 4/1/23 2140)   diphenhydrAMINE (BENADRYL) injection 12.5 mg (12.5 mg Intravenous Given 4/1/23 2140)   prochlorperazine (COMPAZINE) injection 10 mg (10 mg Intravenous Given 4/1/23 2140)     10:05 PM - On reassessment response to intervention was positive. Patient feeling better with medication. She has minimal epigastric tenderness.  She tolerated a p.o. trial.  He was updated about lab results.  She was offered another liter of IV fluids and declined.    10:41 PM   - DC from ED observation    MEDICAL DECISION MAKING:  PROBLEMS EVALUATED THIS VISIT:    This patient presents with uncontrolled vomiting and dehydration for 6 hours today.  She has some hemoconcentration but surprisingly electrolytes are normal and there is no acidosis.  I was initially concerned she would have a severe derangement in her labs.  She has mild epigastric pain that seems to be secondary.  There is no evidence of clinical perforation.  I doubt she has gastritis or ulcer.  Urine appears contaminated especially since she has no symptoms of UTI.  CT imaging considered but unlikely to  given benign exam.  She does have leukocytosis which I think is secondary to the vomiting.  This is likely food poisoning or a viral syndrome.  She does not use cannabis.  He is status post cholecystectomy.  She  has never had diverticulitis.  There is no pancreatitis.    RISK:  Moderate given need for prescription antiemetics       PLAN:  New Prescriptions    PROCHLORPERAZINE (COMPAZINE) 10 MG TAB    Take 1 Tablet by mouth every 6 hours as needed for Nausea/Vomiting.    PROCHLORPERAZINE (COMPAZINE) 25 MG SUPPOS    Insert 1 Suppository into the rectum every 6 hours as needed for Nausea/Vomiting.     Vomiting handout given    Return for uncontrolled vomiting, GI bleed, fever and significant pain    Followup:  Tim Sheridan M.D.  [id number] [address]  [phone]    Schedule an appointment as soon as possible for a visit in 2 days  As needed      CONDITION: stable.     FINAL IMPRESSION  1. Intractable vomiting    2. Epigastric abdominal pain       ED Observation Care    Rachel SANTOS (Scribe), am scribing for, and in the presence of, Dennis Gooden M.D..    Electronically signed by: Rachel Mann (Scribe), 4/1/2023    Dennis SANTOS M.D. personally performed the services described in this documentation, as scribed by Rachel Mann in my presence, and it is both accurate and complete.    The note accurately reflects work and decisions made by me.  Dennis Gooden M.D.  4/1/2023  10:43 PM

## 2023-04-02 NOTE — DISCHARGE INSTRUCTIONS
Take Zofran and/or Compazine for nausea and vomiting.  Take clear fluid diet for 6 hours and advance as tolerated.  See your doctor if not better 2 days.  Return for uncontrolled vomiting dizziness or significant abdominal pain.

## 2023-04-02 NOTE — ED TRIAGE NOTES
"Chief Complaint   Patient presents with    Abdominal Pain    N/V     Pt report started 6 hours ago started epigastric pain, nausea and vomiting a lot of times, feeling cold and having chills. Pt  took Zofran 8 mg tablet an hour ago and xanax tablet.      /60   Pulse 87   Temp 36.3 °C (97.3 °F) (Temporal)   Resp 18   Ht 1.676 m (5' 6\")   Wt 76.1 kg (167 lb 11.2 oz)   SpO2 97%   BMI 27.07 kg/m²     "

## 2023-04-02 NOTE — ED NOTES
Vital signs taken and recorded. IV removed. Discharge in stable condition ambulatory accompanied by spouse. Health teachings given to patient and family with full understanding of the information given. No personal belongings left.

## 2023-05-19 ENCOUNTER — HOSPITAL ENCOUNTER (OUTPATIENT)
Dept: LAB | Facility: MEDICAL CENTER | Age: 38
End: 2023-05-19
Attending: OBSTETRICS & GYNECOLOGY
Payer: COMMERCIAL

## 2023-05-19 LAB
HBV SURFACE AB SERPL IA-ACNC: 915 MIU/ML (ref 0–10)
HBV SURFACE AG SER QL: NORMAL
HCV AB SER QL: NORMAL
HIV 1+2 AB+HIV1 P24 AG SERPL QL IA: NORMAL
PROLACTIN SERPL-MCNC: 11.3 NG/ML (ref 2.8–26)
T PALLIDUM AB SER QL IA: NORMAL
TSH SERPL DL<=0.005 MIU/L-ACNC: 1.19 UIU/ML (ref 0.38–5.33)

## 2023-05-19 PROCEDURE — 86780 TREPONEMA PALLIDUM: CPT

## 2023-05-19 PROCEDURE — 86706 HEP B SURFACE ANTIBODY: CPT

## 2023-05-19 PROCEDURE — 87340 HEPATITIS B SURFACE AG IA: CPT

## 2023-05-19 PROCEDURE — 36415 COLL VENOUS BLD VENIPUNCTURE: CPT

## 2023-05-19 PROCEDURE — 86803 HEPATITIS C AB TEST: CPT

## 2023-05-19 PROCEDURE — 87491 CHLMYD TRACH DNA AMP PROBE: CPT

## 2023-05-19 PROCEDURE — 84443 ASSAY THYROID STIM HORMONE: CPT

## 2023-05-19 PROCEDURE — 87389 HIV-1 AG W/HIV-1&-2 AB AG IA: CPT

## 2023-05-19 PROCEDURE — 87591 N.GONORRHOEAE DNA AMP PROB: CPT

## 2023-05-19 PROCEDURE — 84146 ASSAY OF PROLACTIN: CPT

## 2023-05-22 ENCOUNTER — OFFICE VISIT (OUTPATIENT)
Dept: MEDICAL GROUP | Facility: LAB | Age: 38
End: 2023-05-22
Payer: COMMERCIAL

## 2023-05-22 VITALS
HEART RATE: 74 BPM | BODY MASS INDEX: 27.56 KG/M2 | WEIGHT: 175.6 LBS | SYSTOLIC BLOOD PRESSURE: 124 MMHG | DIASTOLIC BLOOD PRESSURE: 58 MMHG | TEMPERATURE: 97.8 F | HEIGHT: 67 IN | RESPIRATION RATE: 14 BRPM | OXYGEN SATURATION: 99 %

## 2023-05-22 DIAGNOSIS — R79.89 ABNORMAL CBC: ICD-10-CM

## 2023-05-22 PROCEDURE — 99212 OFFICE O/P EST SF 10 MIN: CPT | Performed by: NURSE PRACTITIONER

## 2023-05-22 PROCEDURE — 3074F SYST BP LT 130 MM HG: CPT | Performed by: NURSE PRACTITIONER

## 2023-05-22 PROCEDURE — 3078F DIAST BP <80 MM HG: CPT | Performed by: NURSE PRACTITIONER

## 2023-05-22 RX ORDER — FAMOTIDINE 20 MG
TABLET ORAL
COMMUNITY
End: 2023-08-08

## 2023-05-22 ASSESSMENT — FIBROSIS 4 INDEX: FIB4 SCORE: 0.57

## 2023-05-22 NOTE — PROGRESS NOTES
"Subjective:     CC:   Chief Complaint   Patient presents with    Lab Results       HPI:   Carlotta presents today with the following:    Abnormal CBC  Patient here today to review labs. Most recent labs were from Dr. Bautista's office and were normal. Labs from ER visit on 4/1/2023 showed signs of infection and dehydration which is consistent with symptoms she was having, which have since resolved. She would like to have these labs rechecked.     ROS:   Gen: no fevers/chills, no changes in weight  Eyes: no changes in vision  ENT: no sore throat, no hearing loss, no bloody nose  Pulm: no sob, no cough  CV: no chest pain, no palpitations  GI: no nausea/vomiting, no diarrhea  : no dysuria  MSk: no myalgias  Skin: no rash  Neuro: no headaches, no numbness/tingling  Heme/Lymph: no easy bruising        - NOTE: All other systems reviewed and are negative, except as in HPI.    Objective:     Exam: /58 (BP Location: Right arm, Patient Position: Sitting, BP Cuff Size: Adult)   Pulse 74   Temp 36.6 °C (97.8 °F)   Resp 14   Ht 1.702 m (5' 7\")   Wt 79.7 kg (175 lb 9.6 oz)   SpO2 99%  Body mass index is 27.5 kg/m².    Constitutional: Alert, no distress, well-groomed.  Skin: Warm, dry, good turgor, no rashes in visible areas.  Eye: Equal, round and reactive, conjunctiva clear, lids normal.  ENMT: Lips without lesions, good dentition, moist mucous membranes.  Neck: Trachea midline, no masses, no thyromegaly.  Respiratory: Unlabored respiratory effort, no cough.  MSK: Normal gait, moves all extremities.  Neuro: Grossly non-focal.   Psych: Alert and oriented x3, normal affect and mood.    Assessment & Plan:     38 y.o. female with the following -     1. Abnormal CBC  Recheck labs.   - CBC WITH DIFFERENTIAL; Future  "

## 2023-05-26 ENCOUNTER — HOSPITAL ENCOUNTER (OUTPATIENT)
Dept: LAB | Facility: MEDICAL CENTER | Age: 38
End: 2023-05-26
Attending: NURSE PRACTITIONER
Payer: COMMERCIAL

## 2023-05-26 DIAGNOSIS — R79.89 ABNORMAL CBC: ICD-10-CM

## 2023-05-26 LAB
BASOPHILS # BLD AUTO: 1.2 % (ref 0–1.8)
BASOPHILS # BLD: 0.12 K/UL (ref 0–0.12)
EOSINOPHIL # BLD AUTO: 0.27 K/UL (ref 0–0.51)
EOSINOPHIL NFR BLD: 2.6 % (ref 0–6.9)
ERYTHROCYTE [DISTWIDTH] IN BLOOD BY AUTOMATED COUNT: 42.8 FL (ref 35.9–50)
HCT VFR BLD AUTO: 42.5 % (ref 37–47)
HGB BLD-MCNC: 13.8 G/DL (ref 12–16)
IMM GRANULOCYTES # BLD AUTO: 0.04 K/UL (ref 0–0.11)
IMM GRANULOCYTES NFR BLD AUTO: 0.4 % (ref 0–0.9)
LYMPHOCYTES # BLD AUTO: 2.39 K/UL (ref 1–4.8)
LYMPHOCYTES NFR BLD: 23 % (ref 22–41)
MCH RBC QN AUTO: 28.6 PG (ref 27–33)
MCHC RBC AUTO-ENTMCNC: 32.5 G/DL (ref 32.2–35.5)
MCV RBC AUTO: 88.2 FL (ref 81.4–97.8)
MONOCYTES # BLD AUTO: 0.89 K/UL (ref 0–0.85)
MONOCYTES NFR BLD AUTO: 8.6 % (ref 0–13.4)
NEUTROPHILS # BLD AUTO: 6.67 K/UL (ref 1.82–7.42)
NEUTROPHILS NFR BLD: 64.2 % (ref 44–72)
NRBC # BLD AUTO: 0 K/UL
NRBC BLD-RTO: 0 /100 WBC (ref 0–0.2)
PLATELET # BLD AUTO: 310 K/UL (ref 164–446)
PMV BLD AUTO: 10.4 FL (ref 9–12.9)
RBC # BLD AUTO: 4.82 M/UL (ref 4.2–5.4)
WBC # BLD AUTO: 10.4 K/UL (ref 4.8–10.8)

## 2023-05-26 PROCEDURE — 36415 COLL VENOUS BLD VENIPUNCTURE: CPT

## 2023-05-26 PROCEDURE — 85025 COMPLETE CBC W/AUTO DIFF WBC: CPT

## 2023-08-08 ENCOUNTER — TELEMEDICINE (OUTPATIENT)
Dept: MEDICAL GROUP | Facility: LAB | Age: 38
End: 2023-08-08
Payer: COMMERCIAL

## 2023-08-08 VITALS — BODY MASS INDEX: 27.47 KG/M2 | WEIGHT: 175 LBS | HEIGHT: 67 IN

## 2023-08-08 DIAGNOSIS — M62.838 NECK MUSCLE SPASM: ICD-10-CM

## 2023-08-08 DIAGNOSIS — F41.0 GENERALIZED ANXIETY DISORDER WITH PANIC ATTACKS: ICD-10-CM

## 2023-08-08 DIAGNOSIS — F41.1 GENERALIZED ANXIETY DISORDER WITH PANIC ATTACKS: ICD-10-CM

## 2023-08-08 PROCEDURE — 99214 OFFICE O/P EST MOD 30 MIN: CPT | Mod: 95 | Performed by: FAMILY MEDICINE

## 2023-08-08 RX ORDER — FAMOTIDINE 20 MG/1
20 TABLET, FILM COATED ORAL 2 TIMES DAILY
COMMUNITY
Start: 2023-07-26 | End: 2023-08-08

## 2023-08-08 RX ORDER — PROGESTERONE 50 MG/ML
INJECTION, SOLUTION INTRAMUSCULAR
COMMUNITY
Start: 2023-06-29 | End: 2023-08-08

## 2023-08-08 RX ORDER — ESTRADIOL 2 MG/1
2 TABLET ORAL 3 TIMES DAILY
COMMUNITY
Start: 2023-07-26 | End: 2023-08-08

## 2023-08-08 RX ORDER — PREDNISONE 5 MG/1
5 TABLET ORAL 2 TIMES DAILY
COMMUNITY
Start: 2023-07-26 | End: 2023-08-08

## 2023-08-08 RX ORDER — ASPIRIN 81 MG/1
81 TABLET, COATED ORAL
COMMUNITY
Start: 2023-07-25 | End: 2023-08-08

## 2023-08-08 RX ORDER — PROGESTERONE 200 MG/1
CAPSULE ORAL
COMMUNITY
Start: 2023-07-26 | End: 2023-08-08

## 2023-08-08 RX ORDER — ALPRAZOLAM 1 MG/1
1 TABLET ORAL
Qty: 30 TABLET | Refills: 0 | Status: SHIPPED | OUTPATIENT
Start: 2023-08-08 | End: 2023-11-06

## 2023-08-08 RX ORDER — DOXYCYCLINE HYCLATE 100 MG
100 TABLET ORAL 2 TIMES DAILY
COMMUNITY
Start: 2023-06-27 | End: 2023-08-08

## 2023-08-08 RX ORDER — DESOGESTREL AND ETHINYL ESTRADIOL 0.15-0.03
KIT ORAL
Status: ON HOLD | COMMUNITY
Start: 2023-08-06 | End: 2024-03-22

## 2023-08-08 RX ORDER — LORATADINE 10 MG/1
10 TABLET ORAL
COMMUNITY
Start: 2023-07-25 | End: 2023-08-08

## 2023-08-08 RX ORDER — CARISOPRODOL 350 MG/1
350 TABLET ORAL
Qty: 50 TABLET | Refills: 0 | Status: SHIPPED | OUTPATIENT
Start: 2023-08-08 | End: 2023-11-06

## 2023-08-08 ASSESSMENT — FIBROSIS 4 INDEX: FIB4 SCORE: 0.56
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
## 2024-03-22 ENCOUNTER — HOSPITAL ENCOUNTER (EMERGENCY)
Facility: MEDICAL CENTER | Age: 39
End: 2024-03-22
Attending: OBSTETRICS & GYNECOLOGY | Admitting: OBSTETRICS & GYNECOLOGY
Payer: COMMERCIAL

## 2024-03-22 VITALS
HEART RATE: 109 BPM | WEIGHT: 198 LBS | OXYGEN SATURATION: 97 % | TEMPERATURE: 97.3 F | BODY MASS INDEX: 31.01 KG/M2 | DIASTOLIC BLOOD PRESSURE: 69 MMHG | RESPIRATION RATE: 17 BRPM | SYSTOLIC BLOOD PRESSURE: 120 MMHG

## 2024-03-22 DIAGNOSIS — U07.1 COVID-19: ICD-10-CM

## 2024-03-22 LAB
ALBUMIN SERPL BCP-MCNC: 3.9 G/DL (ref 3.2–4.9)
ALBUMIN/GLOB SERPL: 1.5 G/DL
ALP SERPL-CCNC: 107 U/L (ref 30–99)
ALT SERPL-CCNC: 21 U/L (ref 2–50)
ANION GAP SERPL CALC-SCNC: 15 MMOL/L (ref 7–16)
APPEARANCE UR: CLEAR
AST SERPL-CCNC: 26 U/L (ref 12–45)
BASOPHILS # BLD AUTO: 0.2 % (ref 0–1.8)
BASOPHILS # BLD: 0.03 K/UL (ref 0–0.12)
BILIRUB SERPL-MCNC: 0.4 MG/DL (ref 0.1–1.5)
BUN SERPL-MCNC: 7 MG/DL (ref 8–22)
CALCIUM ALBUM COR SERPL-MCNC: 8.8 MG/DL (ref 8.5–10.5)
CALCIUM SERPL-MCNC: 8.7 MG/DL (ref 8.5–10.5)
CHLORIDE SERPL-SCNC: 104 MMOL/L (ref 96–112)
CO2 SERPL-SCNC: 17 MMOL/L (ref 20–33)
COLOR UR AUTO: YELLOW
CREAT SERPL-MCNC: 0.57 MG/DL (ref 0.5–1.4)
EOSINOPHIL # BLD AUTO: 0.1 K/UL (ref 0–0.51)
EOSINOPHIL NFR BLD: 0.7 % (ref 0–6.9)
ERYTHROCYTE [DISTWIDTH] IN BLOOD BY AUTOMATED COUNT: 41.1 FL (ref 35.9–50)
GFR SERPLBLD CREATININE-BSD FMLA CKD-EPI: 118 ML/MIN/1.73 M 2
GLOBULIN SER CALC-MCNC: 2.6 G/DL (ref 1.9–3.5)
GLUCOSE SERPL-MCNC: 87 MG/DL (ref 65–99)
GLUCOSE UR QL STRIP.AUTO: NEGATIVE MG/DL
HCT VFR BLD AUTO: 41.5 % (ref 37–47)
HGB BLD-MCNC: 14.7 G/DL (ref 12–16)
IMM GRANULOCYTES # BLD AUTO: 0.09 K/UL (ref 0–0.11)
IMM GRANULOCYTES NFR BLD AUTO: 0.7 % (ref 0–0.9)
KETONES UR QL STRIP.AUTO: >=160 MG/DL
LEUKOCYTE ESTERASE UR QL STRIP.AUTO: NEGATIVE
LYMPHOCYTES # BLD AUTO: 0.93 K/UL (ref 1–4.8)
LYMPHOCYTES NFR BLD: 6.8 % (ref 22–41)
MCH RBC QN AUTO: 30.2 PG (ref 27–33)
MCHC RBC AUTO-ENTMCNC: 35.4 G/DL (ref 32.2–35.5)
MCV RBC AUTO: 85.4 FL (ref 81.4–97.8)
MONOCYTES # BLD AUTO: 0.54 K/UL (ref 0–0.85)
MONOCYTES NFR BLD AUTO: 4 % (ref 0–13.4)
NEUTROPHILS # BLD AUTO: 11.93 K/UL (ref 1.82–7.42)
NEUTROPHILS NFR BLD: 87.6 % (ref 44–72)
NITRITE UR QL STRIP.AUTO: NEGATIVE
NRBC # BLD AUTO: 0 K/UL
NRBC BLD-RTO: 0 /100 WBC (ref 0–0.2)
PH UR STRIP.AUTO: 6 [PH] (ref 5–8)
PLATELET # BLD AUTO: 209 K/UL (ref 164–446)
PMV BLD AUTO: 10.2 FL (ref 9–12.9)
POTASSIUM SERPL-SCNC: 3.8 MMOL/L (ref 3.6–5.5)
PROT SERPL-MCNC: 6.5 G/DL (ref 6–8.2)
PROT UR QL STRIP: 100 MG/DL
RBC # BLD AUTO: 4.86 M/UL (ref 4.2–5.4)
RBC UR QL AUTO: NEGATIVE
SODIUM SERPL-SCNC: 136 MMOL/L (ref 135–145)
SP GR UR STRIP.AUTO: >=1.03 (ref 1–1.03)
WBC # BLD AUTO: 13.6 K/UL (ref 4.8–10.8)

## 2024-03-22 PROCEDURE — 99282 EMERGENCY DEPT VISIT SF MDM: CPT

## 2024-03-22 PROCEDURE — 700105 HCHG RX REV CODE 258: Performed by: OBSTETRICS & GYNECOLOGY

## 2024-03-22 PROCEDURE — 700102 HCHG RX REV CODE 250 W/ 637 OVERRIDE(OP): Performed by: OBSTETRICS & GYNECOLOGY

## 2024-03-22 PROCEDURE — 96374 THER/PROPH/DIAG INJ IV PUSH: CPT

## 2024-03-22 PROCEDURE — A9270 NON-COVERED ITEM OR SERVICE: HCPCS | Performed by: OBSTETRICS & GYNECOLOGY

## 2024-03-22 PROCEDURE — 81002 URINALYSIS NONAUTO W/O SCOPE: CPT

## 2024-03-22 PROCEDURE — 36415 COLL VENOUS BLD VENIPUNCTURE: CPT

## 2024-03-22 PROCEDURE — 700111 HCHG RX REV CODE 636 W/ 250 OVERRIDE (IP): Mod: JZ | Performed by: OBSTETRICS & GYNECOLOGY

## 2024-03-22 PROCEDURE — 59025 FETAL NON-STRESS TEST: CPT

## 2024-03-22 PROCEDURE — 85025 COMPLETE CBC W/AUTO DIFF WBC: CPT

## 2024-03-22 PROCEDURE — 80053 COMPREHEN METABOLIC PANEL: CPT

## 2024-03-22 RX ORDER — SODIUM CHLORIDE, SODIUM LACTATE, POTASSIUM CHLORIDE, AND CALCIUM CHLORIDE .6; .31; .03; .02 G/100ML; G/100ML; G/100ML; G/100ML
1000 INJECTION, SOLUTION INTRAVENOUS ONCE
Status: COMPLETED | OUTPATIENT
Start: 2024-03-22 | End: 2024-03-22

## 2024-03-22 RX ORDER — METOCLOPRAMIDE 10 MG/1
10 TABLET ORAL 4 TIMES DAILY
Qty: 60 TABLET | Refills: 1 | Status: SHIPPED | OUTPATIENT
Start: 2024-03-22

## 2024-03-22 RX ORDER — METOCLOPRAMIDE HYDROCHLORIDE 5 MG/ML
10 INJECTION INTRAMUSCULAR; INTRAVENOUS ONCE
Status: COMPLETED | OUTPATIENT
Start: 2024-03-22 | End: 2024-03-22

## 2024-03-22 RX ORDER — ONDANSETRON 2 MG/ML
4 INJECTION INTRAMUSCULAR; INTRAVENOUS ONCE
Status: DISCONTINUED | OUTPATIENT
Start: 2024-03-22 | End: 2024-03-22

## 2024-03-22 RX ORDER — OMEPRAZOLE 20 MG/1
20 CAPSULE, DELAYED RELEASE ORAL ONCE
Status: COMPLETED | OUTPATIENT
Start: 2024-03-22 | End: 2024-03-22

## 2024-03-22 RX ADMIN — OMEPRAZOLE 20 MG: 20 CAPSULE, DELAYED RELEASE ORAL at 19:57

## 2024-03-22 RX ADMIN — SODIUM CHLORIDE, POTASSIUM CHLORIDE, SODIUM LACTATE AND CALCIUM CHLORIDE 1000 ML: 600; 310; 30; 20 INJECTION, SOLUTION INTRAVENOUS at 18:00

## 2024-03-22 RX ADMIN — METOCLOPRAMIDE 10 MG: 5 INJECTION, SOLUTION INTRAMUSCULAR; INTRAVENOUS at 19:22

## 2024-03-22 ASSESSMENT — ENCOUNTER SYMPTOMS
DIARRHEA: 1
CONSTITUTIONAL NEGATIVE: 1
EYES NEGATIVE: 1
NAUSEA: 1
VOMITING: 1
CARDIOVASCULAR NEGATIVE: 1

## 2024-03-22 ASSESSMENT — FIBROSIS 4 INDEX: FIB4 SCORE: 0.57

## 2024-03-23 NOTE — PROGRESS NOTES
1900: Assumed care of pt. External monitors on. Dr Pierce in department orders received.   2015: Dr Pierce at bedside to assess and discuss POC.   Discharge order received.  labor precautions explained. Pt verbalizes understanding of why she would return to the hospital. Pt discharging to home with all belongings via private vehicle.

## 2024-03-23 NOTE — ED PROVIDER NOTES
Emergency Obstetric Consultation     Date of Service  3/22/2024    Reason for Consultation  No chief complaint on file.      History of Presenting Illness  39 y.o. female who presented 3/22/2024 31 weeks and 0 days, tested positive for COVID approximately 1 week ago.  Patient was actually feeling better earlier this week, however Wednesday and Thursday she started feeling worse again.  This morning she was unable to keep down any liquids, she had uncontrolled nausea and vomiting.  She also is having diarrhea.  No current fever  Reports good fetal movement, no contractions vaginal bleeding or loss of fluid    Review of Systems  Review of Systems   Constitutional: Negative.    HENT: Negative.     Eyes: Negative.    Cardiovascular: Negative.    Gastrointestinal:  Positive for diarrhea, nausea and vomiting.   Skin: Negative.    All other systems reviewed and are negative.      Obstetric History    OB History    Para Term  AB Living   5 1 1 0 0 1   SAB IAB Ectopic Molar Multiple Live Births   0 0 0   0 1      # Outcome Date GA Lbr Domingo/2nd Weight Sex Delivery Anes PTL Lv   5 Current            4 Term 22 39w4d / 05:16 3.55 kg (7 lb 13.2 oz) M CS-LTranv EPI, Spinal N ANEESH   3             2             1                 Gynecologic History  No LMP recorded. Patient is pregnant.    Medical History  Past Medical History:   Diagnosis Date    Eosinophilic esophagitis 2017    Panic attack     Psychiatric disorder     anxiety    Sebaceous cyst of left eyelid 2017    Seborrheic keratosis 2017       Surgical History   has a past surgical history that includes appendectomy; cholecystectomy; dilation and evacuation (N/A, 2020); dilation and curettage (2020, 2020); and primary c section (Bilateral, 2022).    Family History  family history includes Cancer in her father; Heart Disease in her paternal grandfather; Hyperlipidemia in her mother; Hypertension in  her mother; Thyroid in her maternal grandmother, mother, and paternal aunt.    Social History   reports that she has never smoked. She has never used smokeless tobacco. She reports current alcohol use of about 1.8 oz of alcohol per week. She reports that she does not use drugs.    Medications  Medications Prior to Admission   Medication Sig Dispense Refill Last Dose    ENSKYCE 0.15-30 MG-MCG per tablet        CALCIUM-VITAMIN D PO Take  by mouth.       CHOLINE PO Take 350 mg by mouth every day.       omeprazole (PRILOSEC) 20 MG delayed-release capsule Take 1 Capsule by mouth 2 times a day. 180 Capsule 3     Melatonin 10 MG Cap 10 mg.       Prenatal MV-Min-Fe Fum-FA-DHA (PRENATAL 1 PO) Take  by mouth.          Allergies  Allergies   Allergen Reactions    Codeine Unspecified    Hydrocodone      nausea       Physical Exam  Patient Vitals for the past 24 hrs:   BP Temp Temp src Pulse Resp SpO2 Weight   03/22/24 1720 -- -- -- (!) 109 -- 97 % --   03/22/24 1718 120/69 36.3 °C (97.3 °F) Temporal 91 17 98 % 89.8 kg (198 lb)      Exam    Laboratory  Recent Labs     03/22/24  0600   WBC 13.6*   RBC 4.86   HEMOGLOBIN 14.7   HEMATOCRIT 41.5   MCV 85.4   MCH 30.2   MCHC 35.4   RDW 41.1   PLATELETCT 209   MPV 10.2     Recent Labs     03/22/24  0600   SODIUM 136   POTASSIUM 3.8   CHLORIDE 104   CO2 17*   GLUCOSE 87   BUN 7*   CREATININE 0.57   CALCIUM 8.7          NST read by me  Fetal heart tones baseline 140s reactive with no decelerations  [place] no contractions    Assessment & Plan  Assessment:  IUP 31 weeks 0 days  Positive for COVID, uncontrolled nausea vomiting  Patient received IV fluids, Reglan with improvement in symptoms    Plan:  Follow-up with primary provider as scheduled.          Yareli Pierce M.D.

## 2024-03-23 NOTE — PROGRESS NOTES
Pt here with covid x6 days and N/V, diarrhea, and chills. Pt reports she is feeling come cramping. Pt states she hasn't been able to keep down food or water, and has had only  8oz of fluids throughout the day. EFM/Ch[place]m applied.     1730:Report to [facility]. POC discussed.

## 2024-04-07 DIAGNOSIS — K20.0 EOSINOPHILIC ESOPHAGITIS: ICD-10-CM

## 2024-04-08 RX ORDER — OMEPRAZOLE 20 MG/1
20 CAPSULE, DELAYED RELEASE ORAL 2 TIMES DAILY
Qty: 180 CAPSULE | Refills: 3 | Status: SHIPPED | OUTPATIENT
Start: 2024-04-08

## 2024-04-08 NOTE — TELEPHONE ENCOUNTER
Received request via: Pharmacy    Was the patient seen in the last year in this department? Yes    Does the patient have an active prescription (recently filled or refills available) for medication(s) requested? No    Pharm[facility] Name[facility]    Does the patient have care home Plus and need 100 day supply (blood pressure, diabetes and cholesterol meds only)? Patient does not have SCP

## 2024-06-17 ENCOUNTER — TELEMEDICINE (OUTPATIENT)
Dept: MEDICAL GROUP | Facility: LAB | Age: 39
End: 2024-06-17
Payer: COMMERCIAL

## 2024-06-17 VITALS — HEIGHT: 67 IN | BODY MASS INDEX: 30.61 KG/M2 | WEIGHT: 195 LBS

## 2024-06-17 DIAGNOSIS — F41.0 GENERALIZED ANXIETY DISORDER WITH PANIC ATTACKS: ICD-10-CM

## 2024-06-17 DIAGNOSIS — F41.1 GENERALIZED ANXIETY DISORDER WITH PANIC ATTACKS: ICD-10-CM

## 2024-06-17 DIAGNOSIS — M62.838 NECK MUSCLE SPASM: ICD-10-CM

## 2024-06-17 DIAGNOSIS — K20.0 EOSINOPHILIC ESOPHAGITIS: ICD-10-CM

## 2024-06-17 DIAGNOSIS — R51.9 FREQUENT HEADACHES: ICD-10-CM

## 2024-06-17 PROCEDURE — 99214 OFFICE O/P EST MOD 30 MIN: CPT | Mod: 95 | Performed by: FAMILY MEDICINE

## 2024-06-17 RX ORDER — METOCLOPRAMIDE 10 MG/1
1 TABLET ORAL 4 TIMES DAILY
COMMUNITY
End: 2024-06-17

## 2024-06-17 RX ORDER — ALPRAZOLAM 1 MG/1
1 TABLET ORAL
Qty: 30 TABLET | Refills: 0 | Status: SHIPPED | OUTPATIENT
Start: 2024-06-17 | End: 2024-09-15

## 2024-06-17 RX ORDER — OXYCODONE HYDROCHLORIDE 5 MG/1
TABLET ORAL
COMMUNITY
Start: 2024-05-19 | End: 2024-06-17

## 2024-06-17 RX ORDER — ASPIRIN 81 MG/1
1 TABLET ORAL
COMMUNITY
End: 2024-06-17

## 2024-06-17 RX ORDER — OMEPRAZOLE 20 MG/1
20 CAPSULE, DELAYED RELEASE ORAL 2 TIMES DAILY
Qty: 180 CAPSULE | Refills: 3 | Status: SHIPPED | OUTPATIENT
Start: 2024-06-17

## 2024-06-17 RX ORDER — CARISOPRODOL 350 MG/1
350 TABLET ORAL
Qty: 50 TABLET | Refills: 0 | Status: SHIPPED | OUTPATIENT
Start: 2024-06-17 | End: 2024-09-15

## 2024-06-17 RX ORDER — IBUPROFEN 800 MG/1
800 TABLET ORAL EVERY 8 HOURS
COMMUNITY
Start: 2024-05-19 | End: 2024-06-17

## 2024-06-17 RX ORDER — COVID-19 ANTIGEN TEST
KIT MISCELLANEOUS
COMMUNITY
Start: 2024-03-23 | End: 2024-06-17

## 2024-06-17 RX ORDER — ONDANSETRON 4 MG/1
1 TABLET, FILM COATED ORAL EVERY 6 HOURS
COMMUNITY

## 2024-06-17 RX ORDER — CLINDAMYCIN PHOSPHATE 10 MG/ML
SOLUTION TOPICAL
COMMUNITY
Start: 2024-06-07

## 2024-06-17 ASSESSMENT — FIBROSIS 4 INDEX: FIB4 SCORE: 1.06

## 2024-06-17 NOTE — PROGRESS NOTES
Virtual Visit: Established Patient   This visit was conducted via Zoom using secure and encrypted videoconferencing technology.   The patient was in their home in the [facility].    The patient's identity was confirmed and verbal consent was obtained for this virtual visit.     Verbal consent was acquired by the patient to use [employer] ambient listening note generation during this visit Yes     Subjective:   CC:   Chief Complaint   Patient presents with    Medication Refill     Xanax, Soma, Prilosec     Migraine     X Excedrin migraine        Carlotta Doshi is a 39 y.o. female presenting for evaluation and management of:    History of Present Illness  The patient is a 39-year-old female here today to discuss refill of medications. She is currently taking Prilosec for GERD, Xanax for anxiety, Soma for muscle spasms in neck.    The patient recently gave birth to a 4-week-old child and has been experiencing headaches and migraines, although she is uncertain of the distinction between these headaches and migraines. Over the past few weeks, following the birth of her child, she has been experiencing migraines 2 to 3 times per week, predominantly on the right side of her head, specifically behind her eye. Concurrently, she has been experiencing significant tension in the posterior aspect of her neck. Despite the use of  Tylenol, Aleve, and Excedrin Migraine, the migraines persist for approximately 24 hours. She has no history of migraines. The headaches are described as a dull, consistent pain behind her eye, accompanied by intermittent light and noise sensitivity. The neck pain has been consistent, and she has noticed an increase in tension or slight pressure in the posterior aspect of her neck during these headaches. She is not currently breastfeeding.    The patient has been on Xanax since the age of 18, which has significantly reduced her usage to a few times a month. She has a history of frequent  "panic attacks and anxiety attacks, but she has not experienced one for several years. She has been managing symptoms through conservative measures.    The patient is currently on Prilosec 20 mg daily for eosinophilic esophagitis, with the second dose taken as needed.  Symptoms well-controlled with no concerns.  Patient requesting refill of medication.      ROS   -See HPI.    Current medicines (including changes today)  Current Outpatient Medications   Medication Sig Dispense Refill    CLINDAMYCIN PHOSPHATE,TOPICAL, 1 % SWAB APPLY TOPICALLY TO AFFECTED AREAS ON FACE TWICE A DAY      ondansetron (ZOFRAN) 4 MG Tab tablet Take 1 Tablet by mouth every 6 hours.      omeprazole (PRILOSEC) 20 MG delayed-release capsule Take 1 Capsule by mouth 2 times a day. 180 Capsule 3    metoclopramide (REGLAN) 10 MG Tab Take 1 Tablet by mouth 4 times a day. 60 Tablet 1    CALCIUM-VITAMIN D PO Take  by mouth.      Prenatal MV-Min-Fe Fum-FA-DHA (PRENATAL 1 PO) Take  by mouth.       No current facility-administered medications for this visit.       Patient Active Problem List    Diagnosis Date Noted    Generalized anxiety disorder with panic attacks 03/10/2021    Eosinophilic esophagitis 02/23/2017        Objective:   Ht 1.702 m (5' 7.01\")   Wt 88.5 kg (195 lb)   Breastfeeding No   BMI 30.53 kg/m²     Physical Exam:  Constitutional: Alert, no distress, well-groomed.  Skin: No rashes in visible areas.  Eye: Round. Conjunctiva clear, lids normal. No icterus.   ENMT: Lips pink without lesions, good dentition, moist mucous membranes. Phonation normal.  Neck: No masses, no thyromegaly. Moves freely without pain.  Respiratory: Unlabored respiratory effort, no cough or audible wheeze  Psych: Alert and oriented x3, normal affect and mood.     Assessment and Plan:   The following treatment plan was discussed:     Assessment & Plan  1. Migraine headaches.  The patient's symptoms suggest a possible correlation between migraines and tension " headaches. A prescription for Soma, to be taken once daily, has been issued. The patient has been advised to apply heat, massage, and perform stretching exercises. Should the migraines persist, a follow-up appointment will be scheduled.    2.  Chronic neck strain  Uncertain if this is the ongoing etiology of headaches but will begin treatment some as discussed above.  Discussed heat, massage, stretches.  Fall precautions given.    3.  Anxiety  Chronic condition, stable with no concerns.  Will continue treatment conservatively as well as with Xanax on an as-needed basis.  Will refill medication.  Follow-up precautions given.  Obtained and reviewed patient utilization report from [facility] pharmacy database on 06/17/24 to writing prescription for controlled substance II, III or IV per [place] bill . Based on assessment of the report,my physical exam if necessary and the patient's health problem, the prescription is medically necessary.    4.  Eosinophilic esophagitis  Chronic condition, stable no concerns.  Will continue with Prilosec daily, twice a day if needed.  Can do the appropriate diet and exercise regimen.    2. Medication refill.  A refill for Xanax and Prilosec has been issued.      Orders:    1. Eosinophilic esophagitis  - omeprazole (PRILOSEC) 20 MG delayed-release capsule; Take 1 Capsule by mouth 2 times a day.  Dispense: 180 Capsule; Refill: 3    2. Generalized anxiety disorder with panic attacks  - ALPRAZolam (XANAX) 1 MG Tab; Take 1 Tablet by mouth 1 time a day as needed for Anxiety for up to 90 days.  Dispense: 30 Tablet; Refill: 0    3. Neck muscle spasm  - carisoprodol (SOMA) 350 MG Tab; Take 1 Tablet by mouth 1 time a day as needed for Muscle Spasms for up to 90 days.  Dispense: 50 Tablet; Refill: 0    4. Frequent headaches    Other orders  - CLINDAMYCIN PHOSPHATE,TOPICAL, 1 % SWAB; APPLY TOPICALLY TO AFFECTED AREAS ON FACE TWICE A DAY  - ondansetron (ZOFRAN) 4 MG Tab tablet; Take 1 Tablet  by mouth every 6 hours.      Follow-up: No follow-ups on file.

## 2024-09-16 ENCOUNTER — APPOINTMENT (OUTPATIENT)
Dept: MEDICAL GROUP | Facility: LAB | Age: 39
End: 2024-09-16
Payer: COMMERCIAL

## 2024-09-18 ENCOUNTER — TELEMEDICINE (OUTPATIENT)
Dept: MEDICAL GROUP | Facility: LAB | Age: 39
End: 2024-09-18
Payer: COMMERCIAL

## 2024-09-18 VITALS — BODY MASS INDEX: 30.53 KG/M2 | HEIGHT: 67 IN

## 2024-09-18 DIAGNOSIS — K20.0 EOSINOPHILIC ESOPHAGITIS: ICD-10-CM

## 2024-09-18 DIAGNOSIS — T75.3XXA MOTION SICKNESS, INITIAL ENCOUNTER: ICD-10-CM

## 2024-09-18 DIAGNOSIS — F41.1 GENERALIZED ANXIETY DISORDER WITH PANIC ATTACKS: ICD-10-CM

## 2024-09-18 DIAGNOSIS — E66.3 OVERWEIGHT: ICD-10-CM

## 2024-09-18 DIAGNOSIS — M62.838 NECK MUSCLE SPASM: ICD-10-CM

## 2024-09-18 DIAGNOSIS — F41.0 GENERALIZED ANXIETY DISORDER WITH PANIC ATTACKS: ICD-10-CM

## 2024-09-18 PROCEDURE — 99214 OFFICE O/P EST MOD 30 MIN: CPT | Performed by: FAMILY MEDICINE

## 2024-09-18 RX ORDER — ONDANSETRON 4 MG/1
4 TABLET, ORALLY DISINTEGRATING ORAL EVERY 6 HOURS PRN
Qty: 30 TABLET | Refills: 0 | Status: SHIPPED | OUTPATIENT
Start: 2024-09-18

## 2024-09-18 RX ORDER — CARISOPRODOL 350 MG/1
350 TABLET ORAL
Qty: 50 TABLET | Refills: 0 | Status: SHIPPED | OUTPATIENT
Start: 2024-09-18 | End: 2024-12-17

## 2024-09-18 RX ORDER — ALPRAZOLAM 1 MG
1 TABLET ORAL
Qty: 30 TABLET | Refills: 0 | Status: SHIPPED | OUTPATIENT
Start: 2024-09-18 | End: 2024-12-17
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
## 2024-12-20 ENCOUNTER — HOSPITAL ENCOUNTER (OUTPATIENT)
Dept: LAB | Facility: MEDICAL CENTER | Age: 39
End: 2024-12-20
Attending: FAMILY MEDICINE
Payer: COMMERCIAL

## 2024-12-20 ENCOUNTER — OFFICE VISIT (OUTPATIENT)
Dept: MEDICAL GROUP | Facility: LAB | Age: 39
End: 2024-12-20
Payer: COMMERCIAL

## 2024-12-20 VITALS
DIASTOLIC BLOOD PRESSURE: 70 MMHG | RESPIRATION RATE: 16 BRPM | SYSTOLIC BLOOD PRESSURE: 104 MMHG | OXYGEN SATURATION: 96 % | TEMPERATURE: 96.7 F | HEIGHT: 67 IN | BODY MASS INDEX: 29.51 KG/M2 | HEART RATE: 79 BPM | WEIGHT: 188 LBS

## 2024-12-20 DIAGNOSIS — M62.838 NECK MUSCLE SPASM: ICD-10-CM

## 2024-12-20 DIAGNOSIS — Z79.899 LONG TERM PRESCRIPTION BENZODIAZEPINE USE: ICD-10-CM

## 2024-12-20 DIAGNOSIS — F41.1 GENERALIZED ANXIETY DISORDER WITH PANIC ATTACKS: ICD-10-CM

## 2024-12-20 DIAGNOSIS — F41.0 GENERALIZED ANXIETY DISORDER WITH PANIC ATTACKS: ICD-10-CM

## 2024-12-20 PROCEDURE — 99214 OFFICE O/P EST MOD 30 MIN: CPT | Performed by: FAMILY MEDICINE

## 2024-12-20 PROCEDURE — 3078F DIAST BP <80 MM HG: CPT | Performed by: FAMILY MEDICINE

## 2024-12-20 PROCEDURE — 3074F SYST BP LT 130 MM HG: CPT | Performed by: FAMILY MEDICINE

## 2024-12-20 PROCEDURE — 80307 DRUG TEST PRSMV CHEM ANLYZR: CPT

## 2024-12-20 RX ORDER — CARISOPRODOL 350 MG/1
350 TABLET ORAL
Qty: 90 TABLET | Refills: 1 | Status: SHIPPED | OUTPATIENT
Start: 2024-12-20 | End: 2025-03-20

## 2024-12-20 RX ORDER — ALPRAZOLAM 1 MG/1
1 TABLET ORAL NIGHTLY PRN
Qty: 30 TABLET | Refills: 0 | Status: SHIPPED | OUTPATIENT
Start: 2024-12-20 | End: 2025-01-19

## 2024-12-20 ASSESSMENT — FIBROSIS 4 INDEX: FIB4 SCORE: 1.06
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
## 2024-12-20 NOTE — LETTER
[facility]  Tim Sheridan M.D.  [address] [facility] [address]  [place] [address]  Fax: [phone]   Authorization for Release/Disclosure of   Protected Health Information   Name: SEBASTIAN HORAN : 1985 SSN: [id number]   Address: [address] [place] [address] [address] [place] [address] Phone:    [phone] (home)    I authorize the entity listed below to release/disclose the PHI below to:   Counts include 234 beds at the [facility]/Tim Sheridan M.D. and Nash Singh M.D.   Provider or Entity Name: Mikayla Orellana    Address [facility], [facility], Zip : [address] [facility] [address] [place] [address]    [phone]     [phone]    Reason for request: continuity of care   Information to be released:    [  ] LAST COLONOSCOPY,  including any PATH REPORT and follow-up  [  ] LAST FIT/COLOGUARD RESULT [  ] LAST DEXA  [  ] LAST MAMMOGRAM  [ XXX ] LAST PAP  [  ] LAST LABS [  ] RETINA EXAM REPORT  [  ] IMMUNIZATION RECORDS  [  ] Release all info      [  ] Check here and initial the line next to each item to release ALL health information INCLUDING  _____ Care and treatment for drug and / or alcohol abuse  _____ HIV testing, infection status, or AIDS  _____ Genetic Testing    DATES OF SERVICE OR TIME PERIOD TO BE DISCLOSED: _____________  I understand and acknowledge that:  * This Authorization may be revoked at any time by you in writing, except if your health information has already been used or disclosed.  * Your health information that will be used or disclosed as a result of you signing this authorization could be re-disclosed by the recipient. If this occurs, your re-disclosed health information may no longer be protected by State or Federal laws.  * You may refuse to sign this Authorization. Your refusal will not affect your ability to obtain treatment.  * This Authorization becomes effective upon signing and will  on (date) __________.      If no date is indicated, this Authorization will  one (1) year  from the signature date.    Name: Carlotta Doshi  Signature: Date:   12/20/2024     PLEASE FAX REQUESTED RECORDS BACK TO: [phone]

## 2024-12-23 LAB
AMPHET CTO UR CFM-MCNC: NEGATIVE NG/ML
BARBITURATES CTO UR CFM-MCNC: NEGATIVE NG/ML
BENZODIAZ CTO UR CFM-MCNC: NEGATIVE NG/ML
CANNABINOIDS CTO UR CFM-MCNC: NEGATIVE NG/ML
COCAINE CTO UR CFM-MCNC: NEGATIVE NG/ML
CREAT UR-MCNC: 130.3 MG/DL (ref 20–400)
DRUG COMMENT 753798: NORMAL
METHADONE CTO UR CFM-MCNC: NEGATIVE NG/ML
OPIATES CTO UR CFM-MCNC: NEGATIVE NG/ML
PCP CTO UR CFM-MCNC: NEGATIVE NG/ML
PROPOXYPH CTO UR CFM-MCNC: NEGATIVE NG/ML

## 2025-01-30 ENCOUNTER — APPOINTMENT (OUTPATIENT)
Dept: MEDICAL GROUP | Facility: MEDICAL CENTER | Age: 40
End: 2025-01-30
Payer: COMMERCIAL

## 2025-01-30 ENCOUNTER — OFFICE VISIT (OUTPATIENT)
Dept: MEDICAL GROUP | Facility: LAB | Age: 40
End: 2025-01-30
Payer: COMMERCIAL

## 2025-01-30 VITALS
DIASTOLIC BLOOD PRESSURE: 60 MMHG | HEART RATE: 82 BPM | RESPIRATION RATE: 12 BRPM | BODY MASS INDEX: 29.51 KG/M2 | HEIGHT: 67 IN | OXYGEN SATURATION: 97 % | TEMPERATURE: 96.2 F | SYSTOLIC BLOOD PRESSURE: 96 MMHG | WEIGHT: 188 LBS

## 2025-01-30 DIAGNOSIS — H66.002 NON-RECURRENT ACUTE SUPPURATIVE OTITIS MEDIA OF LEFT EAR WITHOUT SPONTANEOUS RUPTURE OF TYMPANIC MEMBRANE: ICD-10-CM

## 2025-01-30 ASSESSMENT — FIBROSIS 4 INDEX: FIB4 SCORE: 1.09

## 2025-01-30 ASSESSMENT — PATIENT HEALTH QUESTIONNAIRE - PHQ9: CLINICAL INTERPRETATION OF PHQ2 SCORE: 0

## 2025-01-30 NOTE — PROGRESS NOTES
Verbal consent was acquired by the patient to use [employer] ambient listening note generation during this visit Yes      Subjective   Carlotta Doshi is a 40 y.o. female who presents for possible ear infection  History of Present Illness  The patient is a 40-year-old established female who presents for evaluation of COVID-19, sinus infection, and ear infection.    She was diagnosed with COVID-19 last week, initially presenting with symptoms of a sore throat and runny nose. Over the weekend, she experienced mild nausea, which has since resolved. She reports no history of ear infections as an adult, except for one instance in 2021 following a COVID-19 infection. She has not been prescribed antibiotics in the past 3 to 6 months. She is currently on maternity leave and is the only member of her household who has not contracted COVID-19 in the past 8 months.  She has an 8-month-old and 2-year-old, 2-year-old is in .      Over the past two days, she has been producing green nasal discharge.  She also reports a mild cough and throat irritation, but no significant respiratory distress. She does not smoke. She attempted a sinus wash yesterday but was unsuccessful due to severe swelling. She has been managing her symptoms with Sudafed, which she took yesterday and intended to take again this morning.    Last night, she developed ear pain, which was severe enough to disrupt her sleep for approximately 2 hours.  She has been managing her symptoms with Sudafed, which she took yesterday and intended to take again this morning. She also administered Tylenol and Motrin this morning due to the severity of her pain and applied a warm compress to her ear.    SOCIAL HISTORY  She does not smoke.    ALLERGIES  She is allergic to CODEINE and HYDROCODONE.    MEDICATIONS  Current: Sudafed, Tylenol, Motrin    Review of Systems  Negative for nausea or vomiting  Objective   BP 96/60 (BP Location: Right arm, Patient Position:  "Sitting, BP Cuff Size: Large adult)   Pulse 82   Temp (!) 35.7 °C (96.2 °F)   Resp 12   Ht 1.702 m (5' 7\")   Wt 85.3 kg (188 lb)   SpO2 97%   Physical Exam  Constitutional: Alert, no distress, plus 3 vital signs  Skin:  Warm, dry, no rashes invisible areas  Eye: Equal, round and reactive, conjunctiva clear  ENMT: Bilateral tympanic membranes are erythematous, left much more so than right.  Positive bilateral maxillary sinus tenderness. Oropharynx is slightly injected without exudate. No tonsillar enlargement..    Neck: Mild anterior cervical, nontender lymphadenopathy  Respiratory: Unlabored respiration, lungs clear to auscultation, no wheezes, no rhonchi  Cardiovascular: Normal rate and rhythm, no murmur, no edema  Psych: Alert, pleasant, well-groomed, normal affect           Assessment & Plan  1. Acute otitis media.  She will start Augmentin 875 mg twice daily for 7 days. Fluticasone 2 sprays in each nostril daily is recommended. For pain control, she may alternate Tylenol and ibuprofen. She is encouraged to stay well hydrated and take vitamin C. If symptoms do not improve within a week, she should follow up sooner if they worsen.    2. Sinusitis.  She will start Augmentin 875 mg twice daily for 7 days. Fluticasone 2 sprays in each nostril daily is recommended. A generic antihistamine such as loratadine 10 mg and 30 mg pseudoephedrine for decongestant properties if desired. She is encouraged to stay well hydrated and take vitamin C. If symptoms do not improve within a week, she should follow up sooner if they worsen.    Follow-up  The patient will follow up in 1 week if not resolved, sooner with worsening.               Please note that this dictation was created using voice recognition software. I have made every reasonable attempt to correct obvious errors, but I expect that there are errors of grammar and possibly content that I did not discover before finalizing the note.  "

## 2025-02-08 ENCOUNTER — OFFICE VISIT (OUTPATIENT)
Dept: URGENT CARE | Facility: CLINIC | Age: 40
End: 2025-02-08
Payer: COMMERCIAL

## 2025-02-08 VITALS
TEMPERATURE: 98.6 F | HEART RATE: 70 BPM | HEIGHT: 67 IN | BODY MASS INDEX: 29.51 KG/M2 | WEIGHT: 188 LBS | DIASTOLIC BLOOD PRESSURE: 76 MMHG | OXYGEN SATURATION: 100 % | SYSTOLIC BLOOD PRESSURE: 118 MMHG | RESPIRATION RATE: 16 BRPM

## 2025-02-08 DIAGNOSIS — T36.95XA ANTIBIOTIC-INDUCED YEAST INFECTION: ICD-10-CM

## 2025-02-08 DIAGNOSIS — B37.9 ANTIBIOTIC-INDUCED YEAST INFECTION: ICD-10-CM

## 2025-02-08 DIAGNOSIS — H66.005 RECURRENT ACUTE SUPPURATIVE OTITIS MEDIA WITHOUT SPONTANEOUS RUPTURE OF LEFT TYMPANIC MEMBRANE: ICD-10-CM

## 2025-02-08 DIAGNOSIS — Z86.16 HISTORY OF COVID-19: ICD-10-CM

## 2025-02-08 DIAGNOSIS — Z86.69 HISTORY OF OTITIS MEDIA: ICD-10-CM

## 2025-02-08 PROCEDURE — 99213 OFFICE O/P EST LOW 20 MIN: CPT | Performed by: PHYSICIAN ASSISTANT

## 2025-02-08 RX ORDER — FLUCONAZOLE 150 MG/1
TABLET ORAL
Qty: 2 TABLET | Refills: 0 | Status: SHIPPED | OUTPATIENT
Start: 2025-02-08 | End: 2025-02-25

## 2025-02-08 RX ORDER — CEFDINIR 300 MG/1
300 CAPSULE ORAL 2 TIMES DAILY
Qty: 14 CAPSULE | Refills: 0 | Status: SHIPPED | OUTPATIENT
Start: 2025-02-08 | End: 2025-02-15

## 2025-02-08 ASSESSMENT — FIBROSIS 4 INDEX: FIB4 SCORE: 1.09

## 2025-02-08 NOTE — PROGRESS NOTES
Subjective:     Verbal consent was acquired by the patient to use [employer] ambient listening note generation during this visit     Carlotta Doshi is a 40 y.o. female who presents for Ear Pain (X3 days, both ears pain, possible ear infection )       History of Present Illness  The patient presents for evaluation of ear pain.    She experienced a COVID-19 infection approximately 3 weeks ago, which was followed by a bilateral ear infection one week later. She completed a course of Augmentin on Thursday. Despite the resolution of pain, she reported persistent itching in her ears, which was observed to be erythematous by her , a nurse. The itching was described as deep-seated, prompting her to insert her finger into her ear for relief. Over the past two days, the itching has subsided, but she has noticed a recurrence of pain. She reports no recurrent ear infections associated with illness. She has a history of sinus infections that have previously progressed to ear infections. She does not believe her current symptoms are as severe as her previous episode, but she feels they are escalating. She has a history of yeast infections following antibiotic therapy, although she did not experience this with her most recent course.    ALLERGIES  The patient has no known allergies.              Medications:  amoxicillin-clavulanate Tabs  CALCIUM-VITAMIN D PO  carisoprodol Tabs  CLINDAMYCIN PHOSPHATE(TOPICAL) Swab  metoclopramide Tabs  omeprazole  ondansetron Tbdp  PRENATAL 1 PO    Allergies:             Codeine and Hydrocodone    Past Social Hx:  Carlotta Doshi  reports that she has never smoked. She has never used smokeless tobacco. She reports current alcohol use of about 1.8 oz of alcohol per week. She reports that she does not use drugs.           Problem list, medications, and allergies reviewed by myself today in Epic.     Objective:     /76   Pulse 70   Temp 37 °C (98.6 °F) (Temporal)    "Resp 16   Ht 1.702 m (5' 7\")   Wt 85.3 kg (188 lb)   LMP 01/19/2025   SpO2 100%   BMI 29.44 kg/m²     Physical Exam  Vitals and nursing note reviewed.   Constitutional:       General: She is not in acute distress.     Appearance: Normal appearance. She is not ill-appearing or toxic-appearing.   HENT:      Head: Normocephalic.      Right Ear: Hearing and external ear normal. No decreased hearing noted. No drainage, swelling or tenderness. No foreign body. Tympanic membrane is not injected, erythematous or bulging.      Left Ear: Hearing and external ear normal. No decreased hearing noted. No drainage, swelling or tenderness. No foreign body. Tympanic membrane is erythematous and bulging. Tympanic membrane is not injected.      Nose: Congestion and rhinorrhea present.      Mouth/Throat:      Mouth: Mucous membranes are moist.      Pharynx: Oropharynx is clear. No oropharyngeal exudate or posterior oropharyngeal erythema.      Tonsils: No tonsillar exudate.   Eyes:      General:         Right eye: No discharge.         Left eye: No discharge.      Pupils: Pupils are equal, round, and reactive to light.   Cardiovascular:      Rate and Rhythm: Normal rate and regular rhythm.      Pulses: Normal pulses.      Heart sounds: Normal heart sounds.   Pulmonary:      Effort: Pulmonary effort is normal. No respiratory distress.      Breath sounds: Normal breath sounds. No stridor or decreased air movement. No wheezing, rhonchi or rales.   Musculoskeletal:      Cervical back: Neck supple. No rigidity.   Lymphadenopathy:      Cervical: No cervical adenopathy.   Skin:     General: Skin is warm.   Neurological:      Mental Status: She is alert.                 Assessment/Plan:     Diagnosis and Associated Orders:     1. History of COVID-19    2. History of otitis media    3. Recurrent acute suppurative otitis media without spontaneous rupture of left tympanic membrane  - cefdinir (OMNICEF) 300 MG Cap; Take 1 Capsule by mouth 2 " times a day for 7 days.  Dispense: 14 Capsule; Refill: 0        Medical Decision Making:    Pleasant 40 y.o. presents to clinic with:    Assessment & Plan    Patient appears to have unresolved otitis media on the left despite 7 days of Augmentin.   The left ear remains erythematous, while the right ear exhibits normal characteristics. A prescription for cefdinir, to be taken twice daily for 7 days, has been issued. She is advised to continue using Flonase and nasal saline spray. Over-the-counter medications such as Tylenol, ibuprofen, and Sudafed can be used as needed for pressure relief. The application of warm compresses is also recommended. A prescription for Diflucan has been provided, to be taken only if symptoms of a yeast infection arise. If symptoms persist beyond a week, a reevaluation will be necessary.      I personally reviewed prior external notes and test results pertinent to today's visit. Patient is clinically stable at today's urgent care visit.  Patient appears nontoxic with no acute distress noted. Appropriate for outpatient care at this time.  Return to clinic or go to ED if symptoms worsen or persist.  Red flag symptoms discussed.  Patient/Parent/Guardian voices understanding.   All side effects of medication discussed including allergic response, GI upset, tendon injury, rash, sedation etc    Please note that this dictation was created using voice recognition software. I have made a reasonable attempt to correct obvious errors, but I expect that there are errors of grammar and possibly content that I did not discover before finalizing the note.    This note was electronically signed by Chantelle Valerio PA-C

## 2025-02-12 ENCOUNTER — OFFICE VISIT (OUTPATIENT)
Dept: MEDICAL GROUP | Facility: LAB | Age: 40
End: 2025-02-12
Payer: COMMERCIAL

## 2025-02-12 VITALS
OXYGEN SATURATION: 94 % | BODY MASS INDEX: 29.2 KG/M2 | HEART RATE: 76 BPM | TEMPERATURE: 96.8 F | DIASTOLIC BLOOD PRESSURE: 60 MMHG | HEIGHT: 67 IN | SYSTOLIC BLOOD PRESSURE: 120 MMHG | WEIGHT: 186.07 LBS | RESPIRATION RATE: 16 BRPM

## 2025-02-12 DIAGNOSIS — H66.92 LEFT OTITIS MEDIA, UNSPECIFIED OTITIS MEDIA TYPE: ICD-10-CM

## 2025-02-12 PROCEDURE — 99213 OFFICE O/P EST LOW 20 MIN: CPT | Performed by: PHYSICIAN ASSISTANT

## 2025-02-12 PROCEDURE — 3078F DIAST BP <80 MM HG: CPT | Performed by: PHYSICIAN ASSISTANT

## 2025-02-12 PROCEDURE — 3074F SYST BP LT 130 MM HG: CPT | Performed by: PHYSICIAN ASSISTANT

## 2025-02-12 RX ORDER — METHYLPREDNISOLONE 4 MG/1
TABLET ORAL
Qty: 21 TABLET | Refills: 0 | Status: SHIPPED | OUTPATIENT
Start: 2025-02-12 | End: 2025-02-25

## 2025-02-12 RX ORDER — DOXYCYCLINE HYCLATE 100 MG
100 TABLET ORAL 2 TIMES DAILY
Qty: 14 TABLET | Refills: 0 | Status: SHIPPED | OUTPATIENT
Start: 2025-02-12 | End: 2025-02-19

## 2025-02-12 ASSESSMENT — FIBROSIS 4 INDEX: FIB4 SCORE: 1.09

## 2025-02-12 NOTE — PROGRESS NOTES
Subjective:     CC: Follow-up ear infection    HPI:   Carlotta here today with   Verbal consent was acquired by the patient to use [employer] ambient listening note generation during this visit Yes     History of Present Illness  The patient presents for evaluation of left ear pain.    She contracted COVID-19 approximately 3 to 4 weeks ago, which was followed by a bilateral ear infection a week later. She was treated with Augmentin for a week. Despite the treatment, she experienced severe pain in her left ear last Saturday, accompanied by redness as observed by her , who is a nurse. She reports no symptoms of vertigo, dizziness, or changes in hearing. There is no significant drainage from either ear, but she notes a scab on the left side. She also experienced sinus pressure, excessive mucus production, and a mild cough last week. She tested negative for COVID-19 over the weekend and reported feeling better yesterday. She has been on Cefdinir for the past 5 days, prescribed by an urgent care facility. To manage the pain, she has been taking Motrin and Tylenol. During her COVID-19 illness, she used a neti pot and subsequently blew her nose, leading to intense pain. She is uncertain if she perforated her eardrum or if the pain is due to pressure. Her  noticed a small scab in her ear.    MEDICATIONS  Current: Cefdinir, Motrin, Tylenol  Past: Augmentin          ROS:  Gen: no fevers/chills, no changes in weight  Eyes: no changes in vision  ENT: no sore throat, no hearing loss, no bloody nose  Pulm: no sob, no cough  CV: no chest pain, no palpitations  GI: no nausea/vomiting, no diarrhea  : no dysuria  MSk: no myalgias  Skin: no rash  Neuro: no headaches, no numbness/tingling  Heme/Lymph: no easy bruising    Current Outpatient Medications Ordered in Epic   Medication Sig Dispense Refill    cefdinir (OMNICEF) 300 MG Cap Take 1 Capsule by mouth 2 times a day for 7 days. 14 Capsule 0    fluconazole (DIFLUCAN)  "150 MG tablet Take one now and repeat in 72 hours if symptoms persist 2 Tablet 0    carisoprodol (SOMA) 350 MG Tab Take 1 Tablet by mouth 1 time a day as needed for Muscle Spasms for up to 90 days. 90 Tablet 1    omeprazole (PRILOSEC) 20 MG delayed-release capsule Take 1 Capsule by mouth 2 times a day. 180 Capsule 3    ondansetron (ZOFRAN ODT) 4 MG TABLET DISPERSIBLE Take 1 Tablet by mouth every 6 hours as needed for Nausea/Vomiting. 30 Tablet 0    CLINDAMYCIN PHOSPHATE,TOPICAL, 1 % SWAB APPLY TOPICALLY TO AFFECTED AREAS ON FACE TWICE A DAY      CALCIUM-VITAMIN D PO Take  by mouth.      metoclopramide (REGLAN) 10 MG Tab Take 1 Tablet by mouth 4 times a day. 60 Tablet 1    Prenatal MV-Min-Fe Fum-FA-DHA (PRENATAL 1 PO) Take  by mouth.       No current Epic-ordered facility-administered medications on file.       Health Maintenance: Completed    Objective:     Exam:  /60 (BP Location: Left arm, Patient Position: Sitting, BP Cuff Size: Adult)   Pulse 76   Temp 36 °C (96.8 °F) (Temporal)   Resp 16   Ht 1.702 m (5' 7\")   Wt 84.4 kg (186 lb 1.1 oz)   LMP 01/19/2025   SpO2 94%   BMI 29.14 kg/m²  Body mass index is 29.14 kg/m².    Gen: Alert and oriented, No apparent distress.  HEENT: pupils PERRLA, The pinna, tragus, and ear canal are non-tender and without swelling.   Left Ear: The ear canal is clear without discharge. The tympanic membrane appears slightly red in appearance.  No evidence of perforation or drainage  Right Ear: The ear canal is clear without discharge. The tympanic membrane is normal in appearance with a good cone of light.   Oral mucosa is pink and moist with good dentition. Tongue normal in appearance without lesions and with good symmetrical movement. No buccal nodules or lesions are noted. The pharynx is normal in appearance without tonsillar swelling or exudates.   Neck: Neck is supple without lymphadenopathy.  Lungs: Normal effort, CTA bilaterally, no wheezes, rhonchi, or " rales  CV: Regular rate and rhythm. No murmurs, rubs, or gallops.  Ext: No clubbing, cyanosis, edema.      Assessment & Plan:     40 y.o. female with the following -     Assessment & Plan  1. Left ear pain.  The patient's symptoms suggest a possible eustachian tube dysfunction, which may be causing the inability to equalize pressure. There is no evidence of an acute or pronounced infection. A steroid taper will be initiated to reduce inflammation and swelling. She is advised to complete her current course of antibiotics, Cefdinir. If there is no improvement, she will start a course of doxycycline. The prescriptions will be sent to [facility] on [address].    1. Left otitis media, unspecified otitis media type  methylPREDNISolone (MEDROL DOSEPAK) 4 MG Tablet Therapy Pack    doxycycline (VIBRAMYCIN) 100 MG Tab            I spent a total of 15 minutes with record review, exam, communication with the patient, communication with other providers, and documentation of this encounter.      No follow-ups on file.    Please note that this dictation was created using voice recognition software. I have made every reasonable attempt to correct obvious errors, but there may be errors of grammar and possibly content that I did not discover before finalizing the note.

## 2025-02-21 RX ORDER — CIPROFLOXACIN AND DEXAMETHASONE 3; 1 MG/ML; MG/ML
4 SUSPENSION/ DROPS AURICULAR (OTIC) 2 TIMES DAILY
Qty: 12 ML | Refills: 0 | Status: SHIPPED | OUTPATIENT
Start: 2025-02-21

## 2025-02-25 ENCOUNTER — OFFICE VISIT (OUTPATIENT)
Dept: MEDICAL GROUP | Facility: MEDICAL CENTER | Age: 40
End: 2025-02-25
Payer: COMMERCIAL

## 2025-02-25 VITALS
OXYGEN SATURATION: 98 % | WEIGHT: 186.95 LBS | HEART RATE: 78 BPM | DIASTOLIC BLOOD PRESSURE: 76 MMHG | HEIGHT: 67 IN | TEMPERATURE: 97.4 F | BODY MASS INDEX: 29.34 KG/M2 | SYSTOLIC BLOOD PRESSURE: 110 MMHG

## 2025-02-25 DIAGNOSIS — H65.195 OTHER RECURRENT ACUTE NONSUPPURATIVE OTITIS MEDIA OF LEFT EAR: ICD-10-CM

## 2025-02-25 DIAGNOSIS — H93.8X1 CONGESTION OF RIGHT EAR: ICD-10-CM

## 2025-02-25 DIAGNOSIS — Z12.31 ENCOUNTER FOR SCREENING MAMMOGRAM FOR BREAST CANCER: ICD-10-CM

## 2025-02-25 PROBLEM — Z14.1 CYSTIC FIBROSIS CARRIER: Status: ACTIVE | Noted: 2023-11-12

## 2025-02-25 PROCEDURE — 3074F SYST BP LT 130 MM HG: CPT | Performed by: FAMILY MEDICINE

## 2025-02-25 PROCEDURE — 3078F DIAST BP <80 MM HG: CPT | Performed by: FAMILY MEDICINE

## 2025-02-25 PROCEDURE — 99214 OFFICE O/P EST MOD 30 MIN: CPT | Performed by: FAMILY MEDICINE

## 2025-02-25 RX ORDER — DOXYCYCLINE HYCLATE 100 MG
100 TABLET ORAL 2 TIMES DAILY
Qty: 14 TABLET | Refills: 0 | Status: SHIPPED | OUTPATIENT
Start: 2025-02-25 | End: 2025-03-04

## 2025-02-25 RX ORDER — PREDNISONE 10 MG/1
TABLET ORAL
Qty: 30 TABLET | Refills: 0 | Status: SHIPPED | OUTPATIENT
Start: 2025-02-25 | End: 2025-03-07

## 2025-02-25 ASSESSMENT — ENCOUNTER SYMPTOMS
PALPITATIONS: 0
CHILLS: 0
FEVER: 0

## 2025-02-25 ASSESSMENT — FIBROSIS 4 INDEX: FIB4 SCORE: 1.09

## 2025-02-25 NOTE — PROGRESS NOTES
Verbal consent was acquired by the patient to use [employer] ambient listening note generation during this visit.      Carlotta was seen today for follow-up.    Diagnoses and all orders for this visit:    Other recurrent acute nonsuppurative otitis media of left ear  -     Referral to ENT  -     predniSONE (DELTASONE) 10 MG Tab; Take 4 Tablets by mouth every day for 4 days, THEN 3 Tablets every day for 3 days, THEN 2 Tablets every day for 2 days, THEN 1 Tablet every day for 1 day.  -     doxycycline (VIBRAMYCIN) 100 MG Tab; Take 1 Tablet by mouth 2 times a day for 7 days.    Congestion of right ear  -     predniSONE (DELTASONE) 10 MG Tab; Take 4 Tablets by mouth every day for 4 days, THEN 3 Tablets every day for 3 days, THEN 2 Tablets every day for 2 days, THEN 1 Tablet every day for 1 day.    Encounter for screening mammogram for breast cancer  -     MA-SCREENING MAMMO BILAT W/IMPLANTS W/MINO W/CAD; Future             Assessment & Plan  1. Recurrent otitis media left ear:  New condition, unstable  - Antibiotics effective in controlling bacterial component, but fluid persistence may facilitate bacterial regrowth  - Referral to ENT specialist for further evaluation  - Prescription for doxycycline for 14 days, with an additional 7-day supply to be obtained from the pharmacy  - Prednisone taper for 10 days: 40 mg for 4 days, 30 mg for 3 days, 20 mg for 2 days, 10 mg for 1 day  - Advised to discontinue use of topical medication    2.  Ear congestion right ear  New condition, unstable, start prednisone taper.      Health maintenance  - Order for a mammogram placed    Follow-up as needed.      Chief complaint::Diagnoses of Other recurrent acute nonsuppurative otitis media of left ear, Congestion of right ear, and Encounter for screening mammogram for breast cancer were pertinent to this visit.      History of Present Illness  The patient is a 40-year-old female who presents for evaluation of ear pain.    Ear Pain  -  Approximately 5 weeks ago, she contracted COVID-19, which was followed by a sinus infection and bilateral otitis media.  - She initiated a 7-day course of amoxicillin-clavulanate 4 weeks ago, but it did not alleviate her symptoms.  - Subsequently, she started a 7-day course of cefdinir 3 weeks ago, which also proved ineffective.  - Two weeks ago, she was prescribed doxycycline and a Medrol Dosepak, with instructions to commence the doxycycline only if the steroids failed to provide relief.  - The steroids were the only treatment that significantly reduced her symptoms, as observed by her , a nurse, who noted a decrease in redness.  - She completed a 5-day tapering course of steroids last Wednesday, but the ear pain recurred on Friday.  - She then started Ciprodex drops, which provided temporary relief for about a day, although the redness persisted.  - She continues to experience pain in both ears, with the left ear being more affected.  - She has not yet started the doxycycline.  - She also reports having had influenza during Christmas.        Review of Systems   Constitutional:  Negative for chills and fever.   HENT:  Positive for ear pain. Negative for ear discharge.    Cardiovascular:  Negative for chest pain, palpitations and leg swelling.          Medications and Allergies:     Current Outpatient Medications   Medication Sig Dispense Refill    predniSONE (DELTASONE) 10 MG Tab Take 4 Tablets by mouth every day for 4 days, THEN 3 Tablets every day for 3 days, THEN 2 Tablets every day for 2 days, THEN 1 Tablet every day for 1 day. 30 Tablet 0    doxycycline (VIBRAMYCIN) 100 MG Tab Take 1 Tablet by mouth 2 times a day for 7 days. 14 Tablet 0    ciprofloxacin/dexamethasone (CIPRODEX) 0.3-0.1 % Suspension Administer 4 Drops into affected ear(s) 2 times a day. 12 mL 0    carisoprodol (SOMA) 350 MG Tab Take 1 Tablet by mouth 1 time a day as needed for Muscle Spasms for up to 90 days. 90 Tablet 1    omeprazole  "(PRILOSEC) 20 MG delayed-release capsule Take 1 Capsule by mouth 2 times a day. 180 Capsule 3    ondansetron (ZOFRAN ODT) 4 MG TABLET DISPERSIBLE Take 1 Tablet by mouth every 6 hours as needed for Nausea/Vomiting. 30 Tablet 0    CLINDAMYCIN PHOSPHATE,TOPICAL, 1 % SWAB APPLY TOPICALLY TO AFFECTED AREAS ON FACE TWICE A DAY      CALCIUM-VITAMIN D PO Take  by mouth.       No current facility-administered medications for this visit.       /76 (BP Location: Left arm, Patient Position: Sitting, BP Cuff Size: Adult)   Pulse 78   Temp 36.3 °C (97.4 °F) (Temporal)   Ht 1.702 m (5' 7\")   Wt 84.8 kg (186 lb 15.2 oz)   SpO2 98% , Body mass index is 29.28 kg/m².      Physical Exam  Constitutional:       Appearance: Normal appearance. She is well-developed and well-groomed.   HENT:      Head: Normocephalic and atraumatic.      Right Ear: External ear normal. Tympanic membrane is bulging. Tympanic membrane is not erythematous.      Left Ear: External ear normal. Tympanic membrane is erythematous and bulging.   Eyes:      General:         Right eye: No discharge.         Left eye: No discharge.      Conjunctiva/sclera: Conjunctivae normal.   Cardiovascular:      Rate and Rhythm: Normal rate.   Pulmonary:      Effort: Pulmonary effort is normal. No respiratory distress.   Musculoskeletal:      Cervical back: Neck supple.   Skin:     Findings: No rash.   Neurological:      Mental Status: She is alert.   Psychiatric:         Mood and Affect: Mood and affect normal.         Behavior: Behavior normal.                Please note that this dictation was created using voice recognition software. I have made every reasonable attempt to correct obvious errors, but I expect that there are errors of grammar and possibly content that I did not discover before finalizing the note.      "

## 2025-02-27 NOTE — Clinical Note
REFERRAL APPROVAL NOTICE         Sent on February 27, 2025                   Carlotta Doshi  [address]  [place] [address]                   Dear Ms. Doshi,    After a careful review of the medical information and benefit coverage, [facility] has processed your referral. See below for additional details.    If applicable, you must be actively enrolled with your insurance for coverage of the authorized service. If you have any questions regarding your coverage, please contact your insurance directly.    REFERRAL INFORMATION   Referral #:  [id number]  Referred-To Provider    Referred-By Provider:  Otolaryngology    NASREEN Husain MD LTD      [address]  [address]  [place] [address]  [phone] [address] [facility] [facility]  [place] [address]  [phone]    Referral Start Date:  02/25/2025  Referral End Date:   02/25/2026             SCHEDULING  If you do not already have an appointment, please call [phone] to make an appointment.     MORE INFORMATION  If you do not already have a [email or web address] account, sign up at: [email or web address]  You can access your medical information, make appointments, see lab results, billing information, and more.  If you have questions regarding this referral, please contact  the [facility] Referrals department at:             [phone]. Monday - Friday 8:00AM - 5:00PM.     Sincerely,    [facility]

## 2025-03-07 ENCOUNTER — OFFICE VISIT (OUTPATIENT)
Dept: MEDICAL GROUP | Age: 40
End: 2025-03-07
Payer: COMMERCIAL

## 2025-03-07 VITALS
OXYGEN SATURATION: 100 % | DIASTOLIC BLOOD PRESSURE: 80 MMHG | BODY MASS INDEX: 29.03 KG/M2 | SYSTOLIC BLOOD PRESSURE: 120 MMHG | HEART RATE: 75 BPM | WEIGHT: 185 LBS | HEIGHT: 67 IN | TEMPERATURE: 96.9 F

## 2025-03-07 DIAGNOSIS — H92.03 OTALGIA OF BOTH EARS: ICD-10-CM

## 2025-03-07 PROCEDURE — 99214 OFFICE O/P EST MOD 30 MIN: CPT | Performed by: FAMILY MEDICINE

## 2025-03-07 PROCEDURE — 3079F DIAST BP 80-89 MM HG: CPT | Performed by: FAMILY MEDICINE

## 2025-03-07 PROCEDURE — 3074F SYST BP LT 130 MM HG: CPT | Performed by: FAMILY MEDICINE

## 2025-03-07 RX ORDER — FLUTICASONE PROPIONATE 50 MCG
1 SPRAY, SUSPENSION (ML) NASAL DAILY
Qty: 16 G | Refills: 0 | Status: SHIPPED | OUTPATIENT
Start: 2025-03-07

## 2025-03-07 RX ORDER — PREDNISONE 10 MG/1
TABLET ORAL
Qty: 14 TABLET | Refills: 0 | Status: SHIPPED | OUTPATIENT
Start: 2025-03-07 | End: 2025-03-14

## 2025-03-07 RX ORDER — MELOXICAM 15 MG/1
15 TABLET ORAL DAILY
Qty: 30 TABLET | Refills: 2 | Status: SHIPPED | OUTPATIENT
Start: 2025-03-07

## 2025-03-07 ASSESSMENT — FIBROSIS 4 INDEX: FIB4 SCORE: 1.09

## 2025-03-07 NOTE — PROGRESS NOTES
This medical record contains text that has been entered with the assistance of computer voice recognition and dictation software.  Therefore, it may contain unintended errors in text, spelling, punctuation, or grammar      Verbal consent was acquired by the patient to use [employer] ambient listening note generation during this visit Yes       Chief Complaint   Patient presents with    Earache     Pt has been ongoing since 01/30/2025-- has done multiple courses of antibiotics and a steroid-- steroid is the only thing that has been working-- patient states that left side is worst than the right both ears are having similar symptoms         Carlotta Doshi is a 40 y.o. female here evaluation and management of: ear pain      HPI:           1. Otalgia of both ears  History of Present Illness  The patient is a 40-year-old female who presents for evaluation of ear infection.    She contracted COVID-19 six weeks ago, which subsequently led to a severe sinus infection and bilateral ear infections. She was referred to an ENT specialist but faced a three-month waiting period. However, she managed to secure an appointment within two weeks at another facility. She reports experiencing a minor sinus headache yesterday, which she attributes to the returning pressure. She does not have any known allergies. Despite a seven-day course of amoxicillin-clavulanic acid, there was no improvement in her condition. A week later, she was prescribed cefdinir for another seven days, which also proved ineffective. Subsequently, she was given a five-day tapering dose of prednisone, starting at 4 mg, which provided some relief. However, upon reducing the dosage, her symptoms, including pain and pressure, recurred. She then tried ciprofloxacin and dexamethasone drops for four days, but these were also ineffective. Approximately one and a half weeks ago, she started a 14-day course of doxycycline along with a 10-day tapering dose of  prednisone, which has been beneficial. Her , a nurse, has been monitoring her condition and noted that the redness only subsides with steroid use. He also observed that the fluid in her ears has not been affected by any of the antibiotics. She noticed that when she reduced her prednisone dosage to 10 mg per day, her pain and pressure symptoms returned, but these symptoms were not present when she was taking 20 or 30 mg per day.            Current medicines (including changes today)  Current Outpatient Medications   Medication Sig Dispense Refill    DOXYCYCLINE PO Take 100 mg by mouth every day.      predniSONE (DELTASONE) 10 MG Tab Take 1 tab po q24h up to 14 days 14 Tablet 0    meloxicam (MOBIC) 15 MG tablet Take 1 Tablet by mouth every day. 30 Tablet 2    fluticasone (FLONASE) 50 MCG/ACT nasal spray Administer 1 Spray into affected nostril(S) every day. 16 g 0    predniSONE (DELTASONE) 10 MG Tab Take 4 Tablets by mouth every day for 4 days, THEN 3 Tablets every day for 3 days, THEN 2 Tablets every day for 2 days, THEN 1 Tablet every day for 1 day. 30 Tablet 0    carisoprodol (SOMA) 350 MG Tab Take 1 Tablet by mouth 1 time a day as needed for Muscle Spasms for up to 90 days. 90 Tablet 1    omeprazole (PRILOSEC) 20 MG delayed-release capsule Take 1 Capsule by mouth 2 times a day. 180 Capsule 3    ondansetron (ZOFRAN ODT) 4 MG TABLET DISPERSIBLE Take 1 Tablet by mouth every 6 hours as needed for Nausea/Vomiting. 30 Tablet 0    CALCIUM-VITAMIN D PO Take  by mouth.       No current facility-administered medications for this visit.     She  has a past medical history of Eosinophilic esophagitis (2/23/2017), Panic attack, Psychiatric disorder, Sebaceous cyst of left eyelid (7/17/2017), and Seborrheic keratosis (7/17/2017).  She  has a past surgical history that includes appendectomy; cholecystectomy; dilation and evacuation (N/A, 8/13/2020); dilation and curettage (05/2020, 08/2020); and primary c section  "(Bilateral, 5/24/2022).  Social History     Tobacco Use    Smoking status: Never    Smokeless tobacco: Never   Vaping Use    Vaping status: Never Used   Substance Use Topics    Alcohol use: Yes     Alcohol/week: 1.8 oz     Types: 3 Glasses of wine per week     Comment: Socially    Drug use: No     Social History     Social History Narrative    Not on file     Family History   Problem Relation Age of Onset    Thyroid Mother     Hypertension Mother     Hyperlipidemia Mother     Cancer Father         basal     Thyroid Paternal Aunt     Thyroid Maternal Grandmother     Heart Disease Paternal Grandfather         MI      Family Status   Relation Name Status    Mo  Alive    Fa  Alive    PAunt  (Not Specified)    MGMo  (Not Specified)    PGFa  (Not Specified)   No partnership data on file         ROS    The pertinent  ROS findings can be seen in the HPI above.     All other systems reviewed and are negative     Objective:     /80 (BP Location: Left arm, Patient Position: Sitting, BP Cuff Size: Adult long)   Pulse 75   Temp 36.1 °C (96.9 °F) (Temporal)   Ht 1.702 m (5' 7\")   Wt 83.9 kg (185 lb)   SpO2 100%  Body mass index is 28.98 kg/m².      Physical Exam:    Constitutional: Alert, no distress.  Skin: No suspicious lesions  Eye: Equal, round and reactive, conjunctiva clear, lids normal.  ENMT: Lips without lesions, good dentition, oropharynx clear.  Left ear reveals a tympanic membrane no erythema, bulging on the right as well not as intense  Neck: Trachea midline, no masses, no thyromegaly. No cervical or supraclavicular lymphadenopathy.  Respiratory: Unlabored respiratory effort, lungs clear to auscultation, no wheezes, no ronchi.  Cardiovascular: Normal S1, S2, no murmur, no edema  Abdomen: Soft, non-tender, no masses, no hepatosplenomegaly.        Assessment and Plan:   The following treatment plan was discussed    All recent labs and provider notes reviewed    Assessment & Plan  1. Ear pain  The patient has " been experiencing persistent ear infections and sinus congestion following a COVID-19 infection 6 weeks ago. Examination revealed a bulging tympanic membrane without redness, indicating significant pressure and fluid buildup. Previous treatments with amoxicillin-clavulanic acid, cefdinir, prednisone, ciprofloxacin, and dexamethasone drops were ineffective. The patient reported some relief with a 10 mg 10-day taper of prednisone, but symptoms returned upon tapering. It was explained that the body is responding to the infection, which is leading to inflammation, and this inflammation is causing discomfort. A prescription for prednisone 10 mg, to be taken as one tablet daily until the ENT consultation, has been provided. Additionally, Mobic (meloxicam) is prescribed to be taken with food, and Flonase is recommended to manage sinus congestion. The patient is advised to continue these medications until the ENT appointment.        1. Otalgia of both ears  - predniSONE (DELTASONE) 10 MG Tab; Take 1 tab po q24h up to 14 days  Dispense: 14 Tablet; Refill: 0  - meloxicam (MOBIC) 15 MG tablet; Take 1 Tablet by mouth every day.  Dispense: 30 Tablet; Refill: 2  - fluticasone (FLONASE) 50 MCG/ACT nasal spray; Administer 1 Spray into affected nostril(S) every day.  Dispense: 16 g; Refill: 0    Other orders  - DOXYCYCLINE PO; Take 100 mg by mouth every day.             Instructed to Follow up in clinic or ER for worsening symptoms, difficulty breathing, lack of expected recovery, or should new symptoms or problems arise.    Followup: Return in about 3 months (around 6/7/2025) for Reevaluation, labs.

## 2025-03-12 ENCOUNTER — APPOINTMENT (OUTPATIENT)
Dept: RADIOLOGY | Facility: MEDICAL CENTER | Age: 40
End: 2025-03-12
Attending: FAMILY MEDICINE
Payer: COMMERCIAL

## 2025-03-12 DIAGNOSIS — Z12.31 ENCOUNTER FOR SCREENING MAMMOGRAM FOR BREAST CANCER: ICD-10-CM

## 2025-03-12 PROCEDURE — 77067 SCR MAMMO BI INCL CAD: CPT

## 2025-03-14 ENCOUNTER — OFFICE VISIT (OUTPATIENT)
Dept: MEDICAL GROUP | Facility: LAB | Age: 40
End: 2025-03-14
Payer: COMMERCIAL

## 2025-03-14 VITALS
WEIGHT: 185.2 LBS | TEMPERATURE: 97.7 F | OXYGEN SATURATION: 97 % | DIASTOLIC BLOOD PRESSURE: 60 MMHG | RESPIRATION RATE: 16 BRPM | HEIGHT: 67 IN | HEART RATE: 102 BPM | BODY MASS INDEX: 29.07 KG/M2 | SYSTOLIC BLOOD PRESSURE: 100 MMHG

## 2025-03-14 DIAGNOSIS — M26.609 TMJ (TEMPOROMANDIBULAR JOINT DISORDER): ICD-10-CM

## 2025-03-14 DIAGNOSIS — F41.0 GENERALIZED ANXIETY DISORDER WITH PANIC ATTACKS: ICD-10-CM

## 2025-03-14 DIAGNOSIS — M62.838 NECK MUSCLE SPASM: ICD-10-CM

## 2025-03-14 DIAGNOSIS — H92.03 OTALGIA OF BOTH EARS: ICD-10-CM

## 2025-03-14 DIAGNOSIS — F41.1 GENERALIZED ANXIETY DISORDER WITH PANIC ATTACKS: ICD-10-CM

## 2025-03-14 PROBLEM — H69.93 EUSTACHIAN TUBE DYSFUNCTION, BILATERAL: Status: ACTIVE | Noted: 2025-03-14

## 2025-03-14 PROCEDURE — 3078F DIAST BP <80 MM HG: CPT | Performed by: FAMILY MEDICINE

## 2025-03-14 PROCEDURE — 3074F SYST BP LT 130 MM HG: CPT | Performed by: FAMILY MEDICINE

## 2025-03-14 PROCEDURE — 99214 OFFICE O/P EST MOD 30 MIN: CPT | Performed by: FAMILY MEDICINE

## 2025-03-14 RX ORDER — ALPRAZOLAM 1 MG/1
1 TABLET ORAL
Qty: 30 TABLET | Refills: 0 | Status: SHIPPED | OUTPATIENT
Start: 2025-03-14 | End: 2025-06-12

## 2025-03-14 RX ORDER — CARISOPRODOL 350 MG/1
350 TABLET ORAL
Qty: 50 TABLET | Refills: 0 | Status: SHIPPED | OUTPATIENT
Start: 2025-03-14 | End: 2025-06-12

## 2025-03-14 RX ORDER — PREDNISONE 5 MG/1
5 TABLET ORAL DAILY
Qty: 7 TABLET | Refills: 0 | Status: SHIPPED | OUTPATIENT
Start: 2025-03-14 | End: 2025-03-21

## 2025-03-14 ASSESSMENT — FIBROSIS 4 INDEX: FIB4 SCORE: 1.09
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
## 2025-03-17 ENCOUNTER — RESULTS FOLLOW-UP (OUTPATIENT)
Dept: MEDICAL GROUP | Facility: MEDICAL CENTER | Age: 40
End: 2025-03-17
Payer: COMMERCIAL

## 2025-03-17 DIAGNOSIS — R92.8 ABNORMAL MAMMOGRAM OF RIGHT BREAST: ICD-10-CM

## 2025-04-05 ENCOUNTER — HOSPITAL ENCOUNTER (OUTPATIENT)
Dept: LAB | Facility: MEDICAL CENTER | Age: 40
End: 2025-04-05
Attending: OTOLARYNGOLOGY
Payer: COMMERCIAL

## 2025-04-05 LAB
T4 FREE SERPL-MCNC: 1.28 NG/DL (ref 0.93–1.7)
TSH SERPL-ACNC: 0.71 UIU/ML (ref 0.38–5.33)

## 2025-04-05 PROCEDURE — 84439 ASSAY OF FREE THYROXINE: CPT

## 2025-04-05 PROCEDURE — 84443 ASSAY THYROID STIM HORMONE: CPT

## 2025-04-05 PROCEDURE — 36415 COLL VENOUS BLD VENIPUNCTURE: CPT

## 2025-04-18 ENCOUNTER — RESULTS FOLLOW-UP (OUTPATIENT)
Dept: MEDICAL GROUP | Facility: LAB | Age: 40
End: 2025-04-18

## 2025-04-18 ENCOUNTER — HOSPITAL ENCOUNTER (OUTPATIENT)
Dept: RADIOLOGY | Facility: MEDICAL CENTER | Age: 40
End: 2025-04-18
Attending: FAMILY MEDICINE
Payer: COMMERCIAL

## 2025-04-18 DIAGNOSIS — R92.8 ABNORMAL MAMMOGRAM OF RIGHT BREAST: ICD-10-CM

## 2025-04-18 PROCEDURE — 77065 DX MAMMO INCL CAD UNI: CPT | Mod: RT

## 2025-04-25 ENCOUNTER — HOSPITAL ENCOUNTER (OUTPATIENT)
Dept: RADIOLOGY | Facility: MEDICAL CENTER | Age: 40
End: 2025-04-25
Attending: FAMILY MEDICINE
Payer: COMMERCIAL

## 2025-04-25 DIAGNOSIS — R92.8 ABNORMAL FINDING ON BREAST IMAGING: ICD-10-CM

## 2025-04-25 LAB — PATHOLOGY CONSULT NOTE: NORMAL

## 2025-04-25 PROCEDURE — 88305 TISSUE EXAM BY PATHOLOGIST: CPT | Mod: 26 | Performed by: PATHOLOGY

## 2025-04-25 PROCEDURE — 88305 TISSUE EXAM BY PATHOLOGIST: CPT | Performed by: PATHOLOGY

## 2025-04-25 PROCEDURE — A4648 IMPLANTABLE TISSUE MARKER: HCPCS

## 2025-04-28 ENCOUNTER — TELEPHONE (OUTPATIENT)
Dept: RADIOLOGY | Facility: MEDICAL CENTER | Age: 40
End: 2025-04-28
Payer: COMMERCIAL

## 2025-04-29 ENCOUNTER — RESULTS FOLLOW-UP (OUTPATIENT)
Dept: MEDICAL GROUP | Facility: LAB | Age: 40
End: 2025-04-29
Payer: COMMERCIAL

## 2025-06-02 ENCOUNTER — APPOINTMENT (OUTPATIENT)
Dept: MEDICAL GROUP | Age: 40
End: 2025-06-02
Payer: COMMERCIAL

## 2025-06-03 ENCOUNTER — APPOINTMENT (OUTPATIENT)
Dept: MEDICAL GROUP | Age: 40
End: 2025-06-03
Payer: COMMERCIAL

## 2025-06-12 ENCOUNTER — OFFICE VISIT (OUTPATIENT)
Dept: MEDICAL GROUP | Facility: LAB | Age: 40
End: 2025-06-12
Payer: COMMERCIAL

## 2025-06-12 ENCOUNTER — APPOINTMENT (OUTPATIENT)
Dept: MEDICAL GROUP | Facility: LAB | Age: 40
End: 2025-06-12
Payer: COMMERCIAL

## 2025-06-12 VITALS
BODY MASS INDEX: 26.21 KG/M2 | WEIGHT: 167 LBS | OXYGEN SATURATION: 98 % | DIASTOLIC BLOOD PRESSURE: 66 MMHG | RESPIRATION RATE: 16 BRPM | HEIGHT: 67 IN | TEMPERATURE: 97.6 F | HEART RATE: 72 BPM | SYSTOLIC BLOOD PRESSURE: 100 MMHG

## 2025-06-12 DIAGNOSIS — E04.2 MULTIPLE THYROID NODULES: Primary | ICD-10-CM

## 2025-06-12 DIAGNOSIS — F41.1 GENERALIZED ANXIETY DISORDER WITH PANIC ATTACKS: ICD-10-CM

## 2025-06-12 DIAGNOSIS — M62.838 NECK MUSCLE SPASM: ICD-10-CM

## 2025-06-12 DIAGNOSIS — E55.9 VITAMIN D DEFICIENCY: ICD-10-CM

## 2025-06-12 DIAGNOSIS — Z00.00 WELLNESS EXAMINATION: ICD-10-CM

## 2025-06-12 DIAGNOSIS — F41.0 GENERALIZED ANXIETY DISORDER WITH PANIC ATTACKS: ICD-10-CM

## 2025-06-12 PROCEDURE — 3078F DIAST BP <80 MM HG: CPT | Performed by: FAMILY MEDICINE

## 2025-06-12 PROCEDURE — 99214 OFFICE O/P EST MOD 30 MIN: CPT | Performed by: FAMILY MEDICINE

## 2025-06-12 PROCEDURE — 3074F SYST BP LT 130 MM HG: CPT | Performed by: FAMILY MEDICINE

## 2025-06-12 RX ORDER — ALPRAZOLAM 1 MG/1
1 TABLET ORAL
Qty: 90 TABLET | Refills: 0 | Status: SHIPPED | OUTPATIENT
Start: 2025-06-12 | End: 2025-09-10

## 2025-06-12 RX ORDER — CARISOPRODOL 350 MG/1
350 TABLET ORAL
Qty: 90 TABLET | Refills: 0 | Status: SHIPPED | OUTPATIENT
Start: 2025-06-12 | End: 2025-09-10

## 2025-06-12 ASSESSMENT — FIBROSIS 4 INDEX: FIB4 SCORE: 1.09

## 2025-06-12 NOTE — PROGRESS NOTES
"Subjective:     Chief Complaint   Patient presents with    Requesting Labs    Medication Refill    Referral Needed      Second opinion on lumps on throat      Bump     Right arm, x1 year          HPI:   Carlotta presents today for yearly blood work.     Also refills. Alprazolam and soma.     Ent:   Recurrent ear infections. This was sorted out, but incidentally found thyroid nodules, had imaging, mildly to moderately suspicious. 2 nodules TR3, TR4. Wondering if she should see someone else with this. ENT told her just to repeat in a year.         Current Medications and Prescriptions Ordered in Epic[1]      ROS:  Gen: no fevers/chills, no changes in weight  Eyes: no changes in vision  ENT: no sore throat, no hearing loss, no bloody nose  Pulm: no sob, no cough  CV: no chest pain, no palpitations  GI: no nausea/vomiting, no diarrhea  : no dysuria  MSk: no myalgias  Skin: no rash  Neuro: no headaches, no numbness/tingling  Heme/Lymph: no easy bruising      Objective:     Exam:  /66   Pulse 72   Temp 36.4 °C (97.6 °F)   Resp 16   Ht 1.702 m (5' 7\")   Wt 75.8 kg (167 lb)   SpO2 98%   BMI 26.16 kg/m²  Body mass index is 26.16 kg/m².    Gen: AAOx3, NAD, well appearing  HEENT: NCAT, EOMI, Nares patent, Mucosa moist  Resp: Normal chest wall rise and fall, not SOB, no tachypnea  Skin: lipoma at right forearm.   Psych: normal speech, not slurred, good insight, affect full  MSK: Moves all four limbs equally and normally, gait normal      Assessment & Plan:     40 y.o. female with the following -     1. Neck muscle spasm  Chronic, stable.  Refill sent in.  PDMP reviewed.  - carisoprodol (SOMA) 350 MG Tab; Take 1 Tablet by mouth 1 time a day as needed for Muscle Spasms for up to 90 days.  Dispense: 90 Tablet; Refill: 0    2. Generalized anxiety disorder with panic attacks  Chronic, stable.  Refill sent in.  PDMP reviewed.  - ALPRAZolam (XANAX) 1 MG Tab; Take 1 Tablet by mouth 1 time a day as needed for Anxiety for " up to 90 days.  Dispense: 90 Tablet; Refill: 0    3. Multiple thyroid nodules (Primary)  Reviewed recent imaging study.  There was 1 nodule that was a TR 4 category which was moderately suspicious.  We did discuss the potential for fine-needle aspiration.  Will try to get her into endocrinology to see if Dr. Avery could do his own ultrasound or biopsy appropriately.  He would also be able to give her an idea about percentages of this becoming something or not.  She does have some thyroid cancer history in her mother but she is not quite sure which type of thyroid cancer.  - Referral to Endocrinology    4. Wellness examination    - CBC WITH DIFFERENTIAL; Future  - Comp Metabolic Panel; Future  - HEMOGLOBIN A1C; Future  - Lipid Profile; Future    5. Vitamin D deficiency    - VITAMIN D,25 HYDROXY (DEFICIENCY); Future            No follow-ups on file.    Please note that this dictation was created using voice recognition software. I have made every reasonable attempt to correct obvious errors, but I expect that there are errors of grammar and possibly content that I did not discover before finalizing the note.             [1]   Current Outpatient Medications Ordered in Epic   Medication Sig Dispense Refill    carisoprodol (SOMA) 350 MG Tab Take 1 Tablet by mouth 1 time a day as needed for Muscle Spasms for up to 90 days. 50 Tablet 0    ALPRAZolam (XANAX) 1 MG Tab Take 1 Tablet by mouth 1 time a day as needed for Anxiety for up to 90 days. 30 Tablet 0    meloxicam (MOBIC) 15 MG tablet Take 1 Tablet by mouth every day. 30 Tablet 2    fluticasone (FLONASE) 50 MCG/ACT nasal spray Administer 1 Spray into affected nostril(S) every day. 16 g 0    omeprazole (PRILOSEC) 20 MG delayed-release capsule Take 1 Capsule by mouth 2 times a day. 180 Capsule 3    ondansetron (ZOFRAN ODT) 4 MG TABLET DISPERSIBLE Take 1 Tablet by mouth every 6 hours as needed for Nausea/Vomiting. 30 Tablet 0    CALCIUM-VITAMIN D PO Take  by mouth.        No current Epic-ordered facility-administered medications on file.

## 2025-06-18 ENCOUNTER — HOSPITAL ENCOUNTER (OUTPATIENT)
Dept: LAB | Facility: MEDICAL CENTER | Age: 40
End: 2025-06-18
Attending: FAMILY MEDICINE
Payer: COMMERCIAL

## 2025-06-18 DIAGNOSIS — Z00.00 WELLNESS EXAMINATION: ICD-10-CM

## 2025-06-18 DIAGNOSIS — E55.9 VITAMIN D DEFICIENCY: ICD-10-CM

## 2025-06-18 LAB
25(OH)D3 SERPL-MCNC: 33 NG/ML (ref 30–100)
ALBUMIN SERPL BCP-MCNC: 4.4 G/DL (ref 3.2–4.9)
ALBUMIN/GLOB SERPL: 2.1 G/DL
ALP SERPL-CCNC: 50 U/L (ref 30–99)
ALT SERPL-CCNC: 10 U/L (ref 2–50)
ANION GAP SERPL CALC-SCNC: 12 MMOL/L (ref 7–16)
AST SERPL-CCNC: 13 U/L (ref 12–45)
BASOPHILS # BLD AUTO: 1.6 % (ref 0–1.8)
BASOPHILS # BLD: 0.11 K/UL (ref 0–0.12)
BILIRUB SERPL-MCNC: 0.3 MG/DL (ref 0.1–1.5)
BUN SERPL-MCNC: 11 MG/DL (ref 8–22)
CALCIUM ALBUM COR SERPL-MCNC: 9 MG/DL (ref 8.5–10.5)
CALCIUM SERPL-MCNC: 9.3 MG/DL (ref 8.5–10.5)
CHLORIDE SERPL-SCNC: 103 MMOL/L (ref 96–112)
CHOLEST SERPL-MCNC: 152 MG/DL (ref 100–199)
CO2 SERPL-SCNC: 23 MMOL/L (ref 20–33)
CREAT SERPL-MCNC: 0.7 MG/DL (ref 0.5–1.4)
EOSINOPHIL # BLD AUTO: 0.35 K/UL (ref 0–0.51)
EOSINOPHIL NFR BLD: 5.1 % (ref 0–6.9)
ERYTHROCYTE [DISTWIDTH] IN BLOOD BY AUTOMATED COUNT: 40.4 FL (ref 35.9–50)
EST. AVERAGE GLUCOSE BLD GHB EST-MCNC: 97 MG/DL
FASTING STATUS PATIENT QL REPORTED: NORMAL
GFR SERPLBLD CREATININE-BSD FMLA CKD-EPI: 112 ML/MIN/1.73 M 2
GLOBULIN SER CALC-MCNC: 2.1 G/DL (ref 1.9–3.5)
GLUCOSE SERPL-MCNC: 81 MG/DL (ref 65–99)
HBA1C MFR BLD: 5 % (ref 4–5.6)
HCT VFR BLD AUTO: 41.5 % (ref 37–47)
HDLC SERPL-MCNC: 53 MG/DL
HGB BLD-MCNC: 13.7 G/DL (ref 12–16)
IMM GRANULOCYTES # BLD AUTO: 0.02 K/UL (ref 0–0.11)
IMM GRANULOCYTES NFR BLD AUTO: 0.3 % (ref 0–0.9)
LDLC SERPL CALC-MCNC: 90 MG/DL
LYMPHOCYTES # BLD AUTO: 2.18 K/UL (ref 1–4.8)
LYMPHOCYTES NFR BLD: 31.7 % (ref 22–41)
MCH RBC QN AUTO: 28.4 PG (ref 27–33)
MCHC RBC AUTO-ENTMCNC: 33 G/DL (ref 32.2–35.5)
MCV RBC AUTO: 85.9 FL (ref 81.4–97.8)
MONOCYTES # BLD AUTO: 0.48 K/UL (ref 0–0.85)
MONOCYTES NFR BLD AUTO: 7 % (ref 0–13.4)
NEUTROPHILS # BLD AUTO: 3.73 K/UL (ref 1.82–7.42)
NEUTROPHILS NFR BLD: 54.3 % (ref 44–72)
NRBC # BLD AUTO: 0 K/UL
NRBC BLD-RTO: 0 /100 WBC (ref 0–0.2)
PLATELET # BLD AUTO: 330 K/UL (ref 164–446)
PMV BLD AUTO: 10.3 FL (ref 9–12.9)
POTASSIUM SERPL-SCNC: 4.6 MMOL/L (ref 3.6–5.5)
PROT SERPL-MCNC: 6.5 G/DL (ref 6–8.2)
RBC # BLD AUTO: 4.83 M/UL (ref 4.2–5.4)
SODIUM SERPL-SCNC: 138 MMOL/L (ref 135–145)
TRIGL SERPL-MCNC: 44 MG/DL (ref 0–149)
WBC # BLD AUTO: 6.9 K/UL (ref 4.8–10.8)

## 2025-06-18 PROCEDURE — 80053 COMPREHEN METABOLIC PANEL: CPT

## 2025-06-18 PROCEDURE — 80061 LIPID PANEL: CPT

## 2025-06-18 PROCEDURE — 83036 HEMOGLOBIN GLYCOSYLATED A1C: CPT

## 2025-06-18 PROCEDURE — 36415 COLL VENOUS BLD VENIPUNCTURE: CPT

## 2025-06-18 PROCEDURE — 82306 VITAMIN D 25 HYDROXY: CPT

## 2025-06-18 PROCEDURE — 85025 COMPLETE CBC W/AUTO DIFF WBC: CPT

## 2025-06-19 ENCOUNTER — RESULTS FOLLOW-UP (OUTPATIENT)
Dept: MEDICAL GROUP | Facility: LAB | Age: 40
End: 2025-06-19
Payer: COMMERCIAL

## 2025-06-19 NOTE — Clinical Note
REFERRAL APPROVAL NOTICE         Sent on June 19, 2025                   Carlotta Doshi  [address]  [place] [address]                   Dear MsAmy Tico,    After a careful review of the medical information and benefit coverage, [facility] has processed your referral. See below for additional details.    If applicable, you must be actively enrolled with your insurance for coverage of the authorized service. If you have any questions regarding your coverage, please contact your insurance directly.    REFERRAL INFORMATION   Referral #:  [id number]  Referred-To Department    Referred-By Provider:  Endocrinology    Carmen López M.D.   Endocrinology [facility]      [address]  [place] [address]  [phone] [address]  [place] [address] [phone]    Referral Start Date:  06/12/2025  Referral End Date:   06/12/2026           SCHEDULING  If you do not already have an appointment, please call [phone] to make an appointment.   MORE INFORMATION  As a reminder, [facility] ownership has changed, meaning this location is now owned and operated by [facility]. As such, we want to clarify that our patients should expect to receive two separate bills for the services received at [facility] - one representing the [facility] facility fees as the owner of the establishment, and the other to represent the physician's services and subsequent fees. You can speak with your insurance carrier for a pricing estimate by calling the customer service number on the back of your card and ask about charges for a hospital outpatient visit.  If you do not already have a [employer] account, sign up at: [email or web address]  You can access your medical information, make appointments, see lab results, billing information, and more.  If you have questions regarding this referral, please contact  the [facility] Referrals department at:              [phone]. Monday - Friday 7:30AM - 5:00PM.      Sincerely,  [facility]

## 2025-08-11 ENCOUNTER — OFFICE VISIT (OUTPATIENT)
Dept: ENDOCRINOLOGY | Facility: MEDICAL CENTER | Age: 40
End: 2025-08-11
Attending: INTERNAL MEDICINE
Payer: COMMERCIAL

## 2025-08-11 VITALS
DIASTOLIC BLOOD PRESSURE: 59 MMHG | OXYGEN SATURATION: 99 % | WEIGHT: 164 LBS | HEART RATE: 66 BPM | BODY MASS INDEX: 25.74 KG/M2 | SYSTOLIC BLOOD PRESSURE: 112 MMHG | HEIGHT: 67 IN

## 2025-08-11 DIAGNOSIS — E04.2 MULTIPLE THYROID NODULES: Primary | ICD-10-CM

## 2025-08-11 DIAGNOSIS — E55.9 VITAMIN D DEFICIENCY: ICD-10-CM

## 2025-08-11 PROCEDURE — 99213 OFFICE O/P EST LOW 20 MIN: CPT | Performed by: INTERNAL MEDICINE

## 2025-08-11 PROCEDURE — 3078F DIAST BP <80 MM HG: CPT | Performed by: INTERNAL MEDICINE

## 2025-08-11 PROCEDURE — 99205 OFFICE O/P NEW HI 60 MIN: CPT | Performed by: INTERNAL MEDICINE

## 2025-08-11 PROCEDURE — 3074F SYST BP LT 130 MM HG: CPT | Performed by: INTERNAL MEDICINE

## 2025-08-11 PROCEDURE — 99212 OFFICE O/P EST SF 10 MIN: CPT | Performed by: INTERNAL MEDICINE

## 2025-08-11 ASSESSMENT — FIBROSIS 4 INDEX: FIB4 SCORE: 0.5

## 2025-08-14 DIAGNOSIS — E04.2 MULTIPLE THYROID NODULES: Primary | ICD-10-CM

## (undated) DEVICE — PACK ROOM TURNOVER L&D (12/CA)

## (undated) DEVICE — NEPTUNE 4 PORT MANIFOLD - (20/PK)

## (undated) DEVICE — CHLORAPREP 26 ML APPLICATOR - ORANGE TINT(25/CA)

## (undated) DEVICE — KIT  I.V. START (100EA/CA)

## (undated) DEVICE — SUTURE3-0 36IN VCRLY PLS ANTI (36PK/BX)

## (undated) DEVICE — Device

## (undated) DEVICE — SUTURE 1 VICRYL PLUS CT-1 - 36 INCH (36/BX)

## (undated) DEVICE — MASK ANESTHESIA ADULT  - (100/CA)

## (undated) DEVICE — LACTATED RINGERS INJ 1000 ML - (14EA/CA 60CA/PF)

## (undated) DEVICE — TRAY SPINAL ANESTHESIA NON-SAFETY (10/CA)

## (undated) DEVICE — SUTURE 3-0 VICRYL PLUS CT-1 - 36 INCH (36/BX)

## (undated) DEVICE — SODIUM CHL IRRIGATION 0.9% 1000ML (12EA/CA)

## (undated) DEVICE — TUBE CONNECTING SUCTION - CLEAR PLASTIC STERILE 72 IN (50EA/CA)

## (undated) DEVICE — TRAY SRGPRP PVP IOD WT PRP - (20/CA)

## (undated) DEVICE — TUBING CLEARLINK DUO-VENT - C-FLO (48EA/CA)

## (undated) DEVICE — WATER IRRIGATION STERILE 1000ML (12EA/CA)

## (undated) DEVICE — CATHETER IV 20 GA X 1-1/4 ---SURG.& SDS ONLY--- (50EA/BX)

## (undated) DEVICE — CANISTER SUCTION RIGID RED 1500CC (40EA/CA)

## (undated) DEVICE — SUCTION INSTRUMENT YANKAUER BULBOUS TIP W/O VENT (50EA/CA)

## (undated) DEVICE — SENSOR SPO2 NEO LNCS ADHESIVE (20/BX) SEE USER NOTES

## (undated) DEVICE — SLEEVE, SEQUENTIAL CALF REG

## (undated) DEVICE — HEAD HOLDER JUNIOR/ADULT

## (undated) DEVICE — GOWN WARMING STANDARD FLEX - (30/CA)

## (undated) DEVICE — SET EXTENSION WITH 2 PORTS (48EA/CA) ***PART #2C8610 IS A SUBSTITUTE*****

## (undated) DEVICE — CATHETER IV NON-SAFETY 18 GA X 1 1/4 (50/BX 4BX/CA)

## (undated) DEVICE — DRESSING POST OP BORDER 4 X 10 (5EA/BX)

## (undated) DEVICE — TAPE CLOTH MEDIPORE 6 INCH - (12RL/CA)

## (undated) DEVICE — KIT ANESTHESIA W/CIRCUIT & 3/LT BAG W/FILTER (20EA/CA)

## (undated) DEVICE — CANISTER SUCTION 3000ML MECHANICAL FILTER AUTO SHUTOFF MEDI-VAC NONSTERILE LF DISP  (40EA/CA)

## (undated) DEVICE — PAD SANITARY 11IN MAXI IND WRAPPED  (12EA/PK 24PK/CA)

## (undated) DEVICE — GLOVE BIOGEL SZ 6.5 SURGICAL PF LTX (50PR/BX 4BX/CA)

## (undated) DEVICE — PROTECTOR ULNA NERVE - (36PR/CA)

## (undated) DEVICE — SET LEADWIRE 5 LEAD BEDSIDE DISPOSABLE ECG (1SET OF 5/EA)

## (undated) DEVICE — GLOVE BIOGEL SZ 7 SURGICAL PF LTX - (50PR/BX 4BX/CA)

## (undated) DEVICE — GLOVE BIOGEL INDICATOR SZ 7SURGICAL PF LTX - (50/BX 4BX/CA)

## (undated) DEVICE — BLANKET UNDERBODY FULL ACCES - (5/CA)

## (undated) DEVICE — PACK C-SECTION (2EA/CA)

## (undated) DEVICE — TUBING D & E COLLECTION SET (50EA/PK)

## (undated) DEVICE — SUTURE GENERAL

## (undated) DEVICE — SUTURE 1 VICRYL PLUS CTX - 36 INCH (36/BX)

## (undated) DEVICE — ELECTRODE DUAL RETURN W/ CORD - (50/PK)

## (undated) DEVICE — PENCIL ELECTSURG 10FT HLSTR - WITH BLADE (50EA/CA)

## (undated) DEVICE — ELECTRODE 850 FOAM ADHESIVE - HYDROGEL RADIOTRNSPRNT (50/PK)

## (undated) DEVICE — SUTURE 4-0 MONOCRYL PLUS PS-1 - 27 INCH (36/BX)